# Patient Record
Sex: FEMALE | Race: ASIAN | NOT HISPANIC OR LATINO | Employment: FULL TIME | URBAN - METROPOLITAN AREA
[De-identification: names, ages, dates, MRNs, and addresses within clinical notes are randomized per-mention and may not be internally consistent; named-entity substitution may affect disease eponyms.]

---

## 2017-02-22 ENCOUNTER — MYC MEDICAL ADVICE (OUTPATIENT)
Dept: FAMILY MEDICINE | Facility: CLINIC | Age: 37
End: 2017-02-22

## 2017-02-22 DIAGNOSIS — H10.45 CHRONIC ALLERGIC CONJUNCTIVITIS: Primary | ICD-10-CM

## 2017-03-16 ENCOUNTER — ALLIED HEALTH/NURSE VISIT (OUTPATIENT)
Dept: FAMILY MEDICINE | Facility: CLINIC | Age: 37
End: 2017-03-16
Payer: COMMERCIAL

## 2017-03-16 VITALS — SYSTOLIC BLOOD PRESSURE: 118 MMHG | DIASTOLIC BLOOD PRESSURE: 82 MMHG

## 2017-03-16 DIAGNOSIS — I10 HYPERTENSION: Primary | ICD-10-CM

## 2017-03-16 PROCEDURE — 99207 ZZC NO CHARGE NURSE ONLY: CPT | Performed by: FAMILY MEDICINE

## 2017-03-16 NOTE — MR AVS SNAPSHOT
After Visit Summary   3/16/2017    See Roshan    MRN: [id number]           Patient Information     Date Of Birth          1980        Visit Information        Provider Department      3/16/2017 2:05 PM Marcia Clemens MD [facility]        Today's Diagnoses     Hypertension    -  1       Follow-ups after your visit        Who to contact     Normal or non-critical lab and imaging results will be communicated to you by MyChart, letter or phone within 4 business days after the clinic has received the results. If you do not hear from us within 7 days, please contact the clinic through MyChart or phone. If you have a critical or abnormal lab result, we will notify you by phone as soon as possible.  Submit refill requests through [employer] or call your pharmacy and they will forward the refill request to us. Please allow 3 business days for your refill to be completed.          If you need to speak with a  for additional information , please call: [phone]             Additional Information About Your Visit        [employer] Information     [employer] gives you secure access to your electronic health record. If you see a primary care provider, you can also send messages to your care team and make appointments. If you have questions, please call your primary care clinic.  If you do not have a primary care provider, please call [phone] and they will assist you.        Care EveryWhere ID     This is your Care EveryWhere ID. This could be used by other organizations to access your [facility] medical records  [id number]         Blood Pressure from Last 3 Encounters:   03/16/17 118/82   12/13/16 130/84   01/05/16 116/78    Weight from Last 3 Encounters:   12/13/16 195 lb (88.5 kg)   01/05/16 195 lb (88.5 kg)   07/16/15 190 lb (86.2 kg)              Today, you had the following     No orders found for display       Primary Care Provider Office Phone # Fax #    Marcia Clemens MD  [phone]       [facility] [address] [facility] [address]        Thank you!     Thank you for choosing [facility]  for your care. Our goal is always to provide you with excellent care. Hearing back from our patients is one way we can continue to improve our services. Please take a few minutes to complete the written survey that you may receive in the mail after your visit with us. Thank you!             Your Updated Medication List - Protect others around you: Learn how to safely use, store and throw away your medicines at www.disposemymeds.org.          This list is accurate as of: 3/16/17  2:07 PM.  Always use your most recent med list.                   Brand Name Dispense Instructions for use    * albuterol (2.5 MG/3ML) 0.083% neb solution     180 vial    Take 1 vial (2.5 mg) by nebulization every 6 hours as needed for shortness of breath / dyspnea or wheezing       * Albuterol Sulfate 108 (90 BASE) MCG/ACT Aepb     1 each    Inhale 2 puffs into the lungs every 4 hours       atenolol 25 MG tablet    TENORMIN    7 tablet    Take 1 tablet (25 mg) by mouth daily       cyclobenzaprine 10 MG tablet    FLEXERIL    30 tablet    Take 0.5-1 tablets (5-10 mg) by mouth 3 times daily as needed for muscle spasms       * FLUoxetine 10 MG capsule    PROzac    90 capsule    Take 1 capsule (10 mg) by mouth daily Patient is due for an office visit       * FLUoxetine 20 MG capsule    PROzac    90 capsule    Take 1 capsule (20 mg) by mouth daily       fluticasone 50 MCG/ACT spray    FLONASE    3 Package    Spray 1-2 sprays into both nostrils daily       guaiFENesin-codeine 100-10 MG/5ML Soln solution    ROBITUSSIN AC    240 mL    Take 10 mLs by mouth every 4 hours as needed       ketotifen 0.025 % Soln ophthalmic solution    ZADITOR/REFRESH ANTI-ITCH    1 Bottle    Place 1 drop into both eyes 2 times daily       levocetirizine 5 MG tablet    XYZAL    90 tablet    Take 1 tablet (5 mg) by  mouth every evening       losartan 50 MG tablet    COZAAR    90 tablet    Take 1 tablet (50 mg) by mouth daily       omeprazole 40 MG capsule    priLOSEC    90 capsule    Take 1 capsule (40 mg) by mouth daily Take 30-60 minutes before a meal.       ZYRTEC 10 MG tablet   Generic drug:  cetirizine     30 tablet    Take 1 tablet by mouth daily.       * Notice:  This list has 4 medication(s) that are the same as other medications prescribed for you. Read the directions carefully, and ask your doctor or other care provider to review them with you.

## 2017-03-16 NOTE — NURSING NOTE
See Roshan is enrolled/participating in the retail pharmacy Blood Pressure Goals Achievement Program (BPGAP).  See Roshan was evaluated at [facility] on March 16, 2017 at which time her blood pressure was:    BP Readings from Last 3 Encounters:   03/16/17 118/82   12/13/16 130/84   01/05/16 116/78     Reviewed lifestyle modifications for blood pressure control and reduction: including making healthy food choices, managing weight, getting regular exercise, smoking cessation, reducing alcohol consumption, monitoring blood pressure regularly.     See Roshan is not experiencing symptoms.    Follow-Up: BP is at goal of < 140/90mmHg (patient 18+ years of age with or without diabetes).  Recommended follow-up at pharmacy in 6 months.     Completed by: Daniela Mancini, PharmD  [facility]  [phone]

## 2017-05-04 ENCOUNTER — OFFICE VISIT (OUTPATIENT)
Dept: SLEEP MEDICINE | Facility: CLINIC | Age: 37
End: 2017-05-04
Attending: FAMILY MEDICINE
Payer: COMMERCIAL

## 2017-05-04 VITALS
SYSTOLIC BLOOD PRESSURE: 123 MMHG | DIASTOLIC BLOOD PRESSURE: 87 MMHG | BODY MASS INDEX: 38.91 KG/M2 | OXYGEN SATURATION: 96 % | WEIGHT: 193 LBS | HEART RATE: 79 BPM | HEIGHT: 59 IN

## 2017-05-04 DIAGNOSIS — G47.33 OSA (OBSTRUCTIVE SLEEP APNEA): ICD-10-CM

## 2017-05-04 PROCEDURE — 99205 OFFICE O/P NEW HI 60 MIN: CPT | Performed by: FAMILY MEDICINE

## 2017-05-04 NOTE — PATIENT INSTRUCTIONS

## 2017-05-04 NOTE — MR AVS SNAPSHOT
After Visit Summary   5/4/2017    See Roshan    MRN: [id number]           Patient Information     Date Of Birth          1980        Visit Information        Provider Department      5/4/2017 8:30 AM Anam Ewing MD [facility]        Today's Diagnoses     BENJAMIN (obstructive sleep apnea)          Care Instructions      Your BMI is Body mass index is 38.98 kg/(m^2).  Weight management is a personal decision.  If you are interested in exploring weight loss strategies, the following discussion covers the approaches that may be successful. Body mass index (BMI) is one way to tell whether you are at a healthy weight, overweight, or obese. It measures your weight in relation to your height.  A BMI of 18.5 to 24.9 is in the healthy range. A person with a BMI of 25 to 29.9 is considered overweight, and someone with a BMI of 30 or greater is considered obese. More than two-thirds of American adults are considered overweight or obese.  Being overweight or obese increases the risk for further weight gain. Excess weight may lead to heart disease and diabetes.  Creating and following plans for healthy eating and physical activity may help you improve your health.  Weight control is part of healthy lifestyle and includes exercise, emotional health, and healthy eating habits. Careful eating habits lifelong are the mainstay of weight control. Though there are significant health benefits from weight loss, long-term weight loss with diet alone may be very difficult to achieve- studies show long-term success with dietary management in less than 10% of people. Attaining a healthy weight may be especially difficult to achieve in those with severe obesity. In some cases, medications, devices and surgical management might be considered.  What can you do?  If you are overweight or obese and are interested in methods for weight loss, you should discuss this with your provider.     Consider  reducing daily calorie intake by 500 calories.     Keep a food journal.     Avoiding skipping meals, consider cutting portions instead.    Diet combined with exercise helps maintain muscle while optimizing fat loss. Strength training is particularly important for building and maintaining muscle mass. Exercise helps reduce stress, increase energy, and improves fitness. Increasing exercise without diet control, however, may not burn enough calories to loose weight.       Start walking three days a week 10-20 minutes at a time    Work towards walking thirty minutes five days a week     Eventually, increase the speed of your walking for 1-2 minutes at time    In addition, we recommend that you review healthy lifestyles and methods for weight loss available through the National Institutes of Health patient information sites:  http://win.niddk.nih.gov/publications/index.htm    And look into health and wellness programs that may be available through your health insurance provider, employer, local community center, or bev club.    Weight management plan: Patient was referred to their PCP to discuss a diet and exercise plan.          Follow-ups after your visit        Follow-up notes from your care team     Return in about 4 weeks (around 6/1/2017).      Who to contact     If you have questions or need follow up information about today's clinic visit or your schedule please contact [facility] directly at [phone].  Normal or non-critical lab and imaging results will be communicated to you by MyChart, letter or phone within 4 business days after the clinic has received the results. If you do not hear from us within 7 days, please contact the clinic through MyChart or phone. If you have a critical or abnormal lab result, we will notify you by phone as soon as possible.  Submit refill requests through [email or web address] or call your pharmacy and they will forward the refill request to us. Please allow 3 business  "days for your refill to be completed.          Additional Information About Your Visit        MyChart Information     [employer] gives you secure access to your electronic health record. If you see a primary care provider, you can also send messages to your care team and make appointments. If you have questions, please call your primary care clinic.  If you do not have a primary care provider, please call [phone] and they will assist you.        Care EveryWhere ID     This is your Care EveryWhere ID. This could be used by other organizations to access your [facility] medical records  [id number]        Your Vitals Were     Pulse Height Pulse Oximetry BMI (Body Mass Index)          79 1.499 m (4' 11\") 96% 38.98 kg/m2         Blood Pressure from Last 3 Encounters:   05/04/17 123/87   03/16/17 118/82   12/13/16 130/84    Weight from Last 3 Encounters:   05/04/17 87.5 kg (193 lb)   12/13/16 88.5 kg (195 lb)   01/05/16 88.5 kg (195 lb)              We Performed the Following     Comprehensive DME     SLEEP EVALUATION & MANAGEMENT REFERRAL - ADULT        Primary Care Provider Office Phone # Fax #    Marcia Clemens [phone]       [facility] [address] [facility] [address]        Thank you!     Thank you for choosing [facility]  for your care. Our goal is always to provide you with excellent care. Hearing back from our patients is one way we can continue to improve our services. Please take a few minutes to complete the written survey that you may receive in the mail after your visit with us. Thank you!             Your Updated Medication List - Protect others around you: Learn how to safely use, store and throw away your medicines at www.disposemymeds.org.          This list is accurate as of: 5/4/17 11:59 PM.  Always use your most recent med list.                   Brand Name Dispense Instructions for use    albuterol (2.5 MG/3ML) 0.083% neb solution     180 vial "    Take 1 vial (2.5 mg) by nebulization every 6 hours as needed for shortness of breath / dyspnea or wheezing       FLUoxetine 20 MG capsule    PROzac    90 capsule    Take 1 capsule (20 mg) by mouth daily       fluticasone 50 MCG/ACT spray    FLONASE    3 Package    Spray 1-2 sprays into both nostrils daily       levocetirizine 5 MG tablet    XYZAL    90 tablet    Take 1 tablet (5 mg) by mouth every evening       losartan 50 MG tablet    COZAAR    90 tablet    Take 1 tablet (50 mg) by mouth daily       omeprazole 40 MG capsule    priLOSEC    90 capsule    Take 1 capsule (40 mg) by mouth daily Take 30-60 minutes before a meal.

## 2017-05-04 NOTE — PROGRESS NOTES
Sleep Consultation:    Date on this visit: 5/4/2017    Arturo Islas is sent by Marcia Clemens for a sleep consultation regarding previous diagnosis of BENJAMIN.    Primary Physician: Marcia Clemens     Arturo Islas is a 36 year old female with a significant past medical history of untreated obstructive sleep apnea (PSG performed 11/4/2015 weight 197 lbs, AHI 21, RDI 41, eduardo SpO2 80%), HTN and obesity who comes in today seeking treatment for daytime fatigue. She reports that she never returned to see the results of her testing in 2015 because she felt like she slept so poorly that the results would not be relevant. She comes in today concerned about ongoing daytime fatigue. She reports going to bed between 11-12pm every night and waking around 7-8am. When she awakes she still feels tired, leading her to question the quality of her sleep. Throughout the day the tiredness seems to linger. She does not consume caffeine on a routine basis. On weekends she routinely takes naps which last anywhere from 30min to several hours. Arturo reports that she does not recall a lot of night time awakenings but she does know that she snores when she sleeps. She is interested in pursuing a treatment option that will increase her energy throughout the day.     Allergies:    Allergies   Allergen Reactions     Amoxicillin Hives       Medications:    Current Outpatient Prescriptions   Medication Sig Dispense Refill     losartan (COZAAR) 50 MG tablet Take 1 tablet (50 mg) by mouth daily 90 tablet 3     FLUoxetine (PROZAC) 20 MG capsule Take 1 capsule (20 mg) by mouth daily 90 capsule 3     levocetirizine (XYZAL) 5 MG tablet Take 1 tablet (5 mg) by mouth every evening 90 tablet 3     omeprazole (PRILOSEC) 40 MG capsule Take 1 capsule (40 mg) by mouth daily Take 30-60 minutes before a meal. 90 capsule 3     albuterol (2.5 MG/3ML) 0.083% nebulizer solution Take 1 vial (2.5 mg) by nebulization every 6 hours as needed for shortness of breath / dyspnea or  wheezing 180 vial 1     fluticasone (FLONASE) 50 MCG/ACT nasal spray Spray 1-2 sprays into both nostrils daily 3 Package 2       Problem List:  Patient Active Problem List    Diagnosis Date Noted     Health Care Home 01/21/2014     Priority: Medium     EMERGENCY CARE PLAN  January 21, 2014: No current Care Coordination follow up planned. Please refer if Care Coordination services are needed.    Presenting Problem Signs and Symptoms Treatment Plan   Questions or concerns   during clinic hours   I will call my clinic directly:  [address] [address]  [phone].    Questions or concerns outside clinic hours   I will call the 24 hour nurse line at   [phone] or [facility].   Need to schedule an appointment   I will call the 24 hour scheduling team at [phone] or my clinic directly at [phone].    Same day treatment     I will call my clinic first, nurse line if after hours, urgent care and express care if needed.   Clinic care coordination services (regular clinic hours)     I will call a clinic care coordinator directly:     Henry Sanchez RN  Mon, Tues, Fri - [phone]  Wed, Thurs - [phone]    Carole Doyle :    [phone]    Or call my clinic at [phone] and ask to speak with care coordination.   Crisis Services: Behavioral or Mental Health  [facility] 24 Hour Phone Line  [phone]    [facility] 24 Hour Crisis Services  [phone]    [facility] Prov[facility] [phone]    [facility]   [phone]       Emergency treatment -- Immediately    CAll 911          Menorrhagia 11/13/2012     Priority: Medium     Obesity (BMI 30-39.9) 04/04/2012     Priority: Medium     Infertility associated with anovulation 12/19/2011     Priority: Medium     CARDIOVASCULAR SCREENING; LDL GOAL LESS THAN 130 08/26/2011     Priority: Medium     Hypertension 08/26/2011     Priority: Medium        Past Medical/Surgical  History:  Past Medical History:   Diagnosis Date     Hypertension      Past Surgical History:   Procedure Laterality Date     ESOPHAGOSCOPY, GASTROSCOPY, DUODENOSCOPY (EGD), COMBINED N/A 7/16/2015    Procedure: COMBINED ESOPHAGOSCOPY, GASTROSCOPY, DUODENOSCOPY (EGD), BIOPSY SINGLE OR MULTIPLE;  Surgeon: Keagan Lozano MD;  Location: [facility]     INJECT EPIDURAL CAUDAL  6/25/2012    Procedure: INJECT EPIDURAL CAUDAL;  ANUEL Caudal--;  Surgeon: Provider, Generic Anesthesia;  Location: [facility]     [place] teeth pulled one  2008       Social History:  Social History     Social History     Marital status:      Spouse name: Candy Islas     Number of children: 0     Years of education: 12+     Occupational History      [employer]     Social History Main Topics     Smoking status: Former Smoker     Packs/day: 0.50     Years: 10.00     Types: Cigarettes     Quit date: 1/1/2005     Smokeless tobacco: Never Used     Alcohol use No     Drug use: No     Sexual activity: No     Other Topics Concern     Parent/Sibling W/ Cabg, Mi Or Angioplasty Before 65f 55m? No      Service No     Blood Transfusions No     Caffeine Concern Yes     mt dew 1 a week     Occupational Exposure No     Hobby Hazards No     Sleep Concern No     Stress Concern No     Weight Concern Yes     Special Diet Yes     one a day and probiotic     Back Care No     Exercise No     Bike Helmet No     Seat Belt Yes     Social History Narrative       Family History:  Family History   Problem Relation Age of Onset     DIABETES Mother      Hypertension Mother      Arthritis Mother      Cardiovascular Father      Hypertension Father      Hypertension Brother      Hypertension Brother      Hypertension Brother      Hypertension Brother      Hypertension Sister      Hypertension Sister      Hypertension Brother        Review of Systems:  A complete review of systems reviewed by me is negative with the exeption of what has been mentioned  "in the history of present illness.    Physical Examination:  Vitals: /87  Pulse 79  Ht 1.499 m (4' 11\")  Wt 87.5 kg (193 lb)  SpO2 96%  BMI 38.98 kg/m2  BMI= Body mass index is 38.98 kg/(m^2).     General: comes to clinic alone, positive affect  EENT: teeth showing no appreciable wear from grinding     Neck Cir (cm): 40 cm    [place] Total Score 5/4/2017   Total score - [place] 6       GENERAL APPEARANCE: healthy, alert, no distress and over weight  RESP: lungs clear to auscultation - no rales, rhonchi or wheezes  CV: regular rates and rhythm, normal S1 S2, no S3 or S4 and no murmur, click or rub  PSYCH: mentation appears normal and affect normal/bright    Impression/Plan:    37 yo F with pmhx of untreated BENJAMIN (study completed 11/4/2015, AHI 21, RDI 41), HTN and obesity seeking treatment for daytime fatigue. Untreated BENJAMIN likely contributing to tiredness.   1. Begin treatment with auto-titrate CPAP 5-15 cm H2O   2. Over the counter mouth guard for pt's concern about grinding teeth     Literature provided regarding sleep apnea.      Polysomnography reviewed.  Obstructive sleep apnea reviewed.  Complications of untreated sleep apnea were reviewed.    Scribe Disclosure:   SALAS ADAIR, MS4, am serving as a scribe; to document services personally performed by Dr. Anam Ewing, based on data collection and the provider's statements to me.     Provider Disclosure:  Anam ADAIR, agree with above History, Review of Systems, Physical exam and Plan.  I have reviewed the content of the documentation and have edited it as needed. I have personally performed the services documented here and the documentation accurately represents those services and the decisions I have made.      I have spent 60 minutes with this patient today in which greater than 50% of this time was spent in the counseling / coordination of care.      Anam Ewing MD        CC: Marcia Clemens        "

## 2017-06-30 ENCOUNTER — DOCUMENTATION ONLY (OUTPATIENT)
Dept: SLEEP MEDICINE | Facility: CLINIC | Age: 37
End: 2017-06-30

## 2017-06-30 NOTE — PROGRESS NOTES
Patient was offered choice of vendor and chose [facility] include 234 beds at the [facility].  Patient Arturo Islas was set up at [facility]  on June 30, 2017. Patient received a [employer] AirSense 10 Auto. Pressures were set at 5-15 cm H2O.   Patient s ramp is 5 cm H2O for Auto and FLEX/EPR is EPR, 2.  Patient received a Resmed Mask name: AIRFIT N20  Nasal mask Size Medium, heated tubing and heated humidifier.  Patient is enrolled in the STM Program and does not need to meet compliance. Patient has a follow up on TBD with Dr. Ewing.    Marisable Ramirez

## 2017-07-01 ENCOUNTER — APPOINTMENT (OUTPATIENT)
Dept: GENERAL RADIOLOGY | Facility: CLINIC | Age: 37
End: 2017-07-01
Attending: NURSE PRACTITIONER
Payer: COMMERCIAL

## 2017-07-01 ENCOUNTER — HOSPITAL ENCOUNTER (EMERGENCY)
Facility: CLINIC | Age: 37
Discharge: HOME | End: 2017-07-01
Attending: NURSE PRACTITIONER | Admitting: NURSE PRACTITIONER
Payer: COMMERCIAL

## 2017-07-01 VITALS
WEIGHT: 190 LBS | SYSTOLIC BLOOD PRESSURE: 136 MMHG | BODY MASS INDEX: 38.3 KG/M2 | TEMPERATURE: 98.1 F | DIASTOLIC BLOOD PRESSURE: 87 MMHG | HEIGHT: 59 IN | HEART RATE: 110 BPM | OXYGEN SATURATION: 95 % | RESPIRATION RATE: 18 BRPM

## 2017-07-01 DIAGNOSIS — S52.124A CLOSED NONDISPLACED FRACTURE OF HEAD OF RIGHT RADIUS, INITIAL ENCOUNTER: Primary | ICD-10-CM

## 2017-07-01 PROCEDURE — 73080 X-RAY EXAM OF ELBOW: CPT | Mod: RT

## 2017-07-01 PROCEDURE — 29105 APPLICATION LONG ARM SPLINT: CPT | Performed by: NURSE PRACTITIONER

## 2017-07-01 PROCEDURE — 99213 OFFICE O/P EST LOW 20 MIN: CPT | Mod: 25 | Performed by: NURSE PRACTITIONER

## 2017-07-01 PROCEDURE — 29105 APPLICATION LONG ARM SPLINT: CPT

## 2017-07-01 PROCEDURE — 99213 OFFICE O/P EST LOW 20 MIN: CPT | Mod: 25

## 2017-07-01 NOTE — DISCHARGE INSTRUCTIONS
Elbow Fracture    You have a break (fracture) of one or more bones of your elbow joint. This may be a small crack in the bone. Or it may be a major break, with the broken parts pushed out of position.  This fracture usually takes 4 to 12 weeks to heal, depending on the type. The first step in treatment is with a splint or cast. Severe fractures may need surgery to put the bone fragments back into place.  Home care  Follow these guidelines when caring for yourself at home:    Keep your arm elevated to reduce pain and swelling. When sitting or lying down keep your arm above the level of your heart. You can do this by placing your arm on a pillow that rests on your chest or on a pillow at your side. This is most important during the first 2 days (48 hours) after the injury.    Put an ice pack on the injured area. Do this for 20 minutes every 1 to 2 hours the first day. You can make an ice pack by wrapping a plastic bag of ice cubes in a thin towel. As the ice melts, be careful that the cast or splint doesn t get wet. You can place the ice pack inside the sling and directly over the splint or cast. Continue to use the ice pack 3 to 4 times a day for the next 2 days. Then use the ice pack as needed to ease pain and swelling.    Keep the splint or cast completely dry at all times. Bathe with your splint or cast out of the water. Protect it with a large plastic bag, rubber-banded at the top end. If a fiberglass splint or cast gets wet, you can dry it with a hair dryer.    You may use acetaminophen or ibuprofen to control pain, unless another pain medicine was prescribed. If you have chronic liver or kidney disease, talk with your healthcare provider before using these medicines. Also talk with your provider if you ve had a stomach ulcer or gastrointestinal bleeding.    Don t put creams or objects under the cast if you have itching.  Follow-up care  Follow up with your healthcare provider in 1 week, or as advised. This is  to make sure the bone is healing the way it should. If a splint was put on, it may be changed to a cast during your follow-up visit.  X-rays may be taken. You will be told of any new findings that may affect your care.  When to seek medical advice  Call your healthcare provider right away if any of these occur:    The cast or splint cracks    The plaster cast or splint becomes wet or soft    The fiberglass cast or splint stays wet for more than 24 hours    Tightness or pain under the cast or splint gets worse    Bad odor from the cast or wound fluid stains the cast    Fingers become swollen, cold, blue, numb, or tingly    You can t move your fingers    Skin around cast becomes red    Fever of 100.4 F (38 C) or higher, or as directed by your healthcare provider   Date Last Reviewed: 2/6/2017 2000-2017 [employer]. [address] [place] [id number]. All rights reserved. This information is not intended as a substitute for professional medical care. Always follow your healthcare professional's instructions.

## 2017-07-01 NOTE — ED AVS SNAPSHOT
[facility] Emergency Department    [address] [facility] [address]    Phone:  [phone]    Fax:  [phone]                                       Arturo Islas   MRN: [id number]    Department:  [facility] Emergency Department   Date of Visit:  7/1/2017           Patient Information     Date Of Birth          1980        Your diagnoses for this visit were:     Closed nondisplaced fracture of head of right radius, initial encounter        You were seen by Louise Velázquez APRN CNP.      Follow-up Information     Follow up with ortho  In 3 days.    Why:  For wound re-check        Follow up In 3 days.    Why:  For wound re-check        Discharge Instructions         Elbow Fracture    You have a break (fracture) of one or more bones of your elbow joint. This may be a small crack in the bone. Or it may be a major break, with the broken parts pushed out of position.  This fracture usually takes 4 to 12 weeks to heal, depending on the type. The first step in treatment is with a splint or cast. Severe fractures may need surgery to put the bone fragments back into place.  Home care  Follow these guidelines when caring for yourself at home:    Keep your arm elevated to reduce pain and swelling. When sitting or lying down keep your arm above the level of your heart. You can do this by placing your arm on a pillow that rests on your chest or on a pillow at your side. This is most important during the first 2 days (48 hours) after the injury.    Put an ice pack on the injured area. Do this for 20 minutes every 1 to 2 hours the first day. You can make an ice pack by wrapping a plastic bag of ice cubes in a thin towel. As the ice melts, be careful that the cast or splint doesn t get wet. You can place the ice pack inside the sling and directly over the splint or cast. Continue to use the ice pack 3 to 4 times a day for the next 2 days. Then use the ice pack as needed to ease pain and swelling.    Keep  the splint or cast completely dry at all times. Bathe with your splint or cast out of the water. Protect it with a large plastic bag, rubber-banded at the top end. If a fiberglass splint or cast gets wet, you can dry it with a hair dryer.    You may use acetaminophen or ibuprofen to control pain, unless another pain medicine was prescribed. If you have chronic liver or kidney disease, talk with your healthcare provider before using these medicines. Also talk with your provider if you ve had a stomach ulcer or gastrointestinal bleeding.    Don t put creams or objects under the cast if you have itching.  Follow-up care  Follow up with your healthcare provider in 1 week, or as advised. This is to make sure the bone is healing the way it should. If a splint was put on, it may be changed to a cast during your follow-up visit.  X-rays may be taken. You will be told of any new findings that may affect your care.  When to seek medical advice  Call your healthcare provider right away if any of these occur:    The cast or splint cracks    The plaster cast or splint becomes wet or soft    The fiberglass cast or splint stays wet for more than 24 hours    Tightness or pain under the cast or splint gets worse    Bad odor from the cast or wound fluid stains the cast    Fingers become swollen, cold, blue, numb, or tingly    You can t move your fingers    Skin around cast becomes red    Fever of 100.4 F (38 C) or higher, or as directed by your healthcare provider   Date Last Reviewed: 2/6/2017 2000-2017 [employer]. [address] [place] [address], [place] [address]. All rights reserved. This information is not intended as a substitute for professional medical care. Always follow your healthcare professional's instructions.          24 Hour Appointment Hotline       To make an appointment at any [facility] clinic, call [phone] ([phone]). If you don't have a family doctor or clinic, we will help you find one.  [facility] [place] are conveniently located to serve the needs of you and your family.             Review of your medicines      Our records show that you are taking the medicines listed below. If these are incorrect, please call your family doctor or clinic.        Dose / Directions Last dose taken    albuterol (2.5 MG/3ML) 0.083% neb solution   Dose:  1 vial   Quantity:  180 vial        Take 1 vial (2.5 mg) by nebulization every 6 hours as needed for shortness of breath / dyspnea or wheezing   Refills:  1        FLUoxetine 20 MG capsule   Commonly known as:  PROzac   Dose:  20 mg   Quantity:  90 capsule        Take 1 capsule (20 mg) by mouth daily   Refills:  3        fluticasone 50 MCG/ACT spray   Commonly known as:  FLONASE   Dose:  1-2 spray   Quantity:  3 Package        Spray 1-2 sprays into both nostrils daily   Refills:  2        levocetirizine 5 MG tablet   Commonly known as:  XYZAL   Dose:  5 mg   Quantity:  90 tablet        Take 1 tablet (5 mg) by mouth every evening   Refills:  3        losartan 50 MG tablet   Commonly known as:  COZAAR   Dose:  50 mg   Quantity:  90 tablet        Take 1 tablet (50 mg) by mouth daily   Refills:  3        omeprazole 40 MG capsule   Commonly known as:  priLOSEC   Dose:  40 mg   Quantity:  90 capsule        Take 1 capsule (40 mg) by mouth daily Take 30-60 minutes before a meal.   Refills:  3        order for DME        Equipment ordered: RESMED Auto PAP Mask type: Nasal Settings: 5-15 CM H2O   Refills:  0                Procedures and tests performed during your visit     XR Elbow Right G/E 3 Views      Orders Needing Specimen Collection     None      Pending Results     Date and Time Order Name Status Description    7/1/2017 1607 XR Elbow Right G/E 3 Views Preliminary             Pending Culture Results     No orders found from 6/29/2017 to 7/2/2017.            Pending Results Instructions     If you had any lab results that were not finalized at the time of your Discharge,  you can call the ED Lab Result RN at [phone]. You will be contacted by this team for any positive Lab results or changes in treatment. The nurses are available 7 days a week from 10A to 6:30P.  You can leave a message 24 hours per day and they will return your call.        Test Results From Your Hospital Stay              7/1/2017  4:26 PM      Narrative     ELBOW RIGHT THREE OR MORE VIEWS   7/1/2017 4:19 PM     HISTORY: Fell backwards on outstretched hand.    COMPARISON: None.        Impression     IMPRESSION: There is a radial head fracture with a secondary  hemarthrosis. The fracture is relatively nondisplaced.                Thank you for choosing [facility]       Thank you for choosing [facility] for your care. Our goal is always to provide you with excellent care. Hearing back from our patients is one way we can continue to improve our services. Please take a few minutes to complete the written survey that you may receive in the mail after you visit with us. Thank you!        [employer] Information     [employer] gives you secure access to your electronic health record. If you see a primary care provider, you can also send messages to your care team and make appointments. If you have questions, please call your primary care clinic.  If you do not have a primary care provider, please call [phone] and they will assist you.        Care EveryWhere ID     This is your Care EveryWhere ID. This could be used by other organizations to access your [facility] medical records  [id number]        Equal Access to Services     ANTONIO BECERRA : Vanessa Romero, toan amezquita, perla romero. So [facility] [phone].    ATENCIÓN: Si habla español, tiene a woods disposición servicios gratuitos de asistencia lingüística. Llame al [phone].    We comply with applicable federal civil rights laws and [place] laws. We do not discriminate on the basis of race, color,  national origin, age, disability sex, sexual orientation or gender identity.            After Visit Summary       This is your record. Keep this with you and show to your community pharmacist(s) and doctor(s) at your next visit.

## 2017-07-01 NOTE — ED AVS SNAPSHOT
[facility] Emergency Department    [address] [facility] [address]    Phone:  [phone]    Fax:  [phone]                                       Arturo Islas   MRN: [id number]    Department:  [facility] Emergency Department   Date of Visit:  7/1/2017           After Visit Summary Signature Page     I have received my discharge instructions, and my questions have been answered. I have discussed any challenges I see with this plan with the nurse or doctor.    ..........................................................................................................................................  Patient/Patient Representative Signature      ..........................................................................................................................................  Patient Representative Print Name and Relationship to Patient    ..................................................               ................................................  Date                                            Time    ..........................................................................................................................................  Reviewed by Signature/Title    ...................................................              ..............................................  Date                                                            Time

## 2017-07-01 NOTE — ED PROVIDER NOTES
History     Chief Complaint   Patient presents with     Elbow Pain     HPI  See Roshan is a 36 year old female who presents with right arm injury.  Pt states she was skateboarding and fell backwards on her outstretched arm.  She reports inability to fully straighten the arm but reports normal sensation of fingers and normal movement of right hand.  She reports pain at the elbow and mostly the inner elbow.  She reports that if she does not move the are there is no pain.    I have reviewed the Medications, Allergies, Past Medical and Surgical History, and Social History in the Epic system.    Allergies:   Allergies   Allergen Reactions     Amoxicillin Hives       No current facility-administered medications on file prior to encounter.   Current Outpatient Prescriptions on File Prior to Encounter:  order for DME Equipment ordered: RESMED Auto PAP Mask type: Nasal Settings: 5-15 CM H2O   losartan (COZAAR) 50 MG tablet Take 1 tablet (50 mg) by mouth daily   FLUoxetine (PROZAC) 20 MG capsule Take 1 capsule (20 mg) by mouth daily   levocetirizine (XYZAL) 5 MG tablet Take 1 tablet (5 mg) by mouth every evening   omeprazole (PRILOSEC) 40 MG capsule Take 1 capsule (40 mg) by mouth daily Take 30-60 minutes before a meal.   albuterol (2.5 MG/3ML) 0.083% nebulizer solution Take 1 vial (2.5 mg) by nebulization every 6 hours as needed for shortness of breath / dyspnea or wheezing   fluticasone (FLONASE) 50 MCG/ACT nasal spray Spray 1-2 sprays into both nostrils daily     Patient Active Problem List   Diagnosis     CARDIOVASCULAR SCREENING; LDL GOAL LESS THAN 130     Hypertension     Infertility associated with anovulation     Obesity (BMI 30-39.9)     Menorrhagia     Health Care Home     Past Surgical History:   Procedure Laterality Date     ESOPHAGOSCOPY, GASTROSCOPY, DUODENOSCOPY (EGD), COMBINED N/A 7/16/2015    Procedure: COMBINED ESOPHAGOSCOPY, GASTROSCOPY, DUODENOSCOPY (EGD), BIOPSY SINGLE OR MULTIPLE;  Surgeon: Keagan Lozano,  "MD;  Location: [facility]     INJECT EPIDURAL CAUDAL  6/25/2012    Procedure: INJECT EPIDURAL CAUDAL;  ANUEL Caudal--;  Surgeon: Provider, Generic Anesthesia;  Location: [facility]     [place] teeth pulled one  2008     Most Recent Immunizations   Administered Date(s) Administered     Influenza (IIV3) 11/01/2014     Influenza vaccine ages 6-35 months 10/25/2016     Tdap (Adacel,Boostrix) 12/10/2010       Review of Systems  10 point ROS of systems including Constitutional, Eyes, Respiratory, Cardiovascular, Gastroenterology, Genitourinary, Integumentary, Muscularskeletal, Psychiatric were all negative except for pertinent positives noted in my HPI.    Physical Exam   BP: 136/87  Pulse: 110  Temp: 98.1  F (36.7  C)  Resp: 18  Height: 149.9 cm (4' 11\")  Weight: 86.2 kg (190 lb)  SpO2: 95 %  Physical Exam   Constitutional: She is oriented to person, place, and time. She appears well-developed and well-nourished. She appears distressed (mild distress with pain).   HENT:   Head: Normocephalic and atraumatic.   Cardiovascular: Normal rate, regular rhythm and normal heart sounds.  Exam reveals no gallop and no friction rub.    No murmur heard.  Pulmonary/Chest: Effort normal and breath sounds normal. No respiratory distress. She has no wheezes. She has no rales. She exhibits no tenderness.   Musculoskeletal:        Right elbow: She exhibits decreased range of motion (can extend to approximately 120 degrees and can flex to 90 degrees with discomfort) and swelling (mild swelling). She exhibits no effusion and no laceration. Tenderness found. Radial head tenderness noted.   Neurological: She is alert and oriented to person, place, and time.   Skin: Skin is warm, dry and intact. No rash noted. She is not diaphoretic. No erythema. No pallor.   Nursing note and vitals reviewed.      ED Course     ED Course     Splint application  Date/Time: 7/1/2017 4:50 PM  Performed by: RAMIREZ CARDOZO  Authorized by: RAMIREZ CARDOZO "   Consent: Verbal consent obtained. Written consent not obtained.  Consent given by: patient  Patient understanding: patient states understanding of the procedure being performed  Relevant documents: relevant documents present and verified  Imaging studies: imaging studies available  Patient identity confirmed: verbally with patient  Location details: right elbow  Splint type: long arm  Supplies used: cotton padding,  elastic bandage and Ortho-Glass  Post-procedure: The splinted body part was neurovascularly unchanged following the procedure.  Patient tolerance: Patient tolerated the procedure well with no immediate complications          Labs Ordered and Resulted from Time of ED Arrival Up to the Time of Departure from the ED - No data to display  Results for orders placed or performed during the hospital encounter of 07/01/17   XR Elbow Right G/E 3 Views    Narrative    ELBOW RIGHT THREE OR MORE VIEWS   7/1/2017 4:19 PM     HISTORY: Fell backwards on outstretched hand.    COMPARISON: None.      Impression    IMPRESSION: There is a radial head fracture with a secondary  hemarthrosis. The fracture is relatively nondisplaced.       Assessments & Plan (with Medical Decision Making)     I have reviewed the nursing notes.    I have reviewed the findings, diagnosis, plan and need for follow up with the patient.  Arturo Islas is a 36 year old female who presents with right arm injury.  Pt states she was skateboarding and fell backwards on her outstretched arm.  She reports inability to fully straighten the arm but reports normal sensation of fingers and normal movement of right hand.  She reports pain at the elbow and mostly the inner elbow.  She reports that if she does not move the are there is no pain. Exam reveals tenderness at the radial head and inability to straighten fully.  Xray reveals non displaced radial head fracture that was splinted with long arm splint without complications or neurovascular compromise.   Discussed ortho referral for ongoing care.    Current Discharge Medication List          Final diagnoses:   Closed nondisplaced fracture of head of right radius, initial encounter       7/1/2017   [facility] EMERGENCY DEPARTMENT     Louise Velázquez, ALETHA CNP  07/01/17 2161

## 2017-07-05 ENCOUNTER — DOCUMENTATION ONLY (OUTPATIENT)
Dept: SLEEP MEDICINE | Facility: CLINIC | Age: 37
End: 2017-07-05

## 2017-07-05 ENCOUNTER — OFFICE VISIT (OUTPATIENT)
Dept: ORTHOPEDICS | Facility: CLINIC | Age: 37
End: 2017-07-05
Payer: COMMERCIAL

## 2017-07-05 VITALS
SYSTOLIC BLOOD PRESSURE: 136 MMHG | WEIGHT: 193 LBS | HEIGHT: 59 IN | BODY MASS INDEX: 38.91 KG/M2 | DIASTOLIC BLOOD PRESSURE: 89 MMHG | HEART RATE: 73 BPM

## 2017-07-05 DIAGNOSIS — S52.121A CLOSED DISPLACED FRACTURE OF HEAD OF RIGHT RADIUS, INITIAL ENCOUNTER: Primary | ICD-10-CM

## 2017-07-05 PROCEDURE — 99213 OFFICE O/P EST LOW 20 MIN: CPT | Performed by: FAMILY MEDICINE

## 2017-07-05 ASSESSMENT — PAIN SCALES - GENERAL: PAINLEVEL: SEVERE PAIN (7)

## 2017-07-05 NOTE — PROGRESS NOTES
HISTORY OF PRESENT ILLNESS  Ms. Islas is a pleasant 36 year old year old female who presents to clinic today with an elbow fracture.  She's seen at the request of the [facility].  See explains that on July 1 she was skateboarding and fell backwards on to an outstretched arm.  Immediate pain, was unable to fully extend her arm.  She was seen at the emergency room and was given a sling with a posterior splint for comfort.  She still has pain, although slightly improved. She's been spending all of her time in the splint.  Quality:  achy pain, sharp at times    Severity: moderate to severe  Timing: occurs intermittently  Associated signs & symptoms: none  Previous similar injury: no  Additional history: as documented    MEDICAL HISTORY  Patient Active Problem List   Diagnosis     CARDIOVASCULAR SCREENING; LDL GOAL LESS THAN 130     Hypertension     Infertility associated with anovulation     Obesity (BMI 30-39.9)     Menorrhagia     Health Care Home       Current Outpatient Prescriptions   Medication Sig Dispense Refill     order for DME Equipment ordered: RESMED Auto PAP Mask type: Nasal Settings: 5-15 CM H2O       losartan (COZAAR) 50 MG tablet Take 1 tablet (50 mg) by mouth daily 90 tablet 3     FLUoxetine (PROZAC) 20 MG capsule Take 1 capsule (20 mg) by mouth daily 90 capsule 3     levocetirizine (XYZAL) 5 MG tablet Take 1 tablet (5 mg) by mouth every evening 90 tablet 3     omeprazole (PRILOSEC) 40 MG capsule Take 1 capsule (40 mg) by mouth daily Take 30-60 minutes before a meal. 90 capsule 3     albuterol (2.5 MG/3ML) 0.083% nebulizer solution Take 1 vial (2.5 mg) by nebulization every 6 hours as needed for shortness of breath / dyspnea or wheezing 180 vial 1     fluticasone (FLONASE) 50 MCG/ACT nasal spray Spray 1-2 sprays into both nostrils daily 3 Package 2       Allergies   Allergen Reactions     Amoxicillin Hives       Family History   Problem Relation Age of Onset     DIABETES Mother   "    Hypertension Mother      Arthritis Mother      Cardiovascular Father      Hypertension Father      Hypertension Brother      Hypertension Brother      Hypertension Brother      Hypertension Brother      Hypertension Sister      Hypertension Sister      Hypertension Brother        Additional medical/Social/Surgical histories reviewed in [facility] and updated as appropriate.     REVIEW OF SYSTEMS (7/5/2017)  10 point ROS of systems including Constitutional, Eyes, Respiratory, Cardiovascular, Gastroenterology, Genitourinary, Integumentary, Musculoskeletal, Psychiatric were all negative except for pertinent positives noted in my HPI.     PHYSICAL EXAM  Vitals:    07/05/17 1058   BP: 136/89   Pulse: 73   Weight: 87.5 kg (193 lb)   Height: 1.499 m (4' 11\")     General  - normal appearance, in no obvious distress  CV  - normal radial pulse  Pulm  - normal respiratory pattern, non-labored  Musculoskeletal - right elbow  - inspection: mild elbow swelling  - palpation: no bony or soft tissue tenderness  - ROM: extension and supination limited, painful, no clicks or mechanical block  - strength: 5/5  strength, 5/5 flexion, extension, pronation, supination  Neuro  - no sensory or motor deficit, grossly normal coordination, normal muscle tone  Skin  - no ecchymosis, erythema, warmth, or induration, no obvious rash  Psych  - interactive, appropriate, normal mood and affect          ASSESSMENT & PLAN  Ms. Islas is a 36 year old year old female who is in the office today with a right radial head fracture.    I reviewed her x-ray in the room with her which shows a intra-articular radial head fracture with about 1 mm of displacement.    See's radial head fracture is a marty classification 1.  She'll likely do very well with nonoperative treatment.  I do think she can stay in her splint and sling for comfort, although I do recommend early mobilization to prevent loss of range of motion of the elbow.  It would be ideal if See could " use her sling only for comfort, without the splint, and come out of the sling a few times a day for range of motion exercises.    She will follow up in 5-7 days, at that visit we'll do a repeat radiograph.    It was a pleasure taking care of See.        Adam Garsia DO, CAS[facility]

## 2017-07-05 NOTE — PATIENT INSTRUCTIONS
Thanks for coming today.  Ortho/Sports Medicine Clinic  [address] [place] [address]    To schedule future appointments in Ortho Clinic, you may call [phone].    To schedule ordered imaging by your provider:   Call [facility] [phone]    To schedule an injection ordered by your provider:  Call [facility] Injection scheduling line: [phone]  [facility] available online at:  [email or web address]    Please call if any further questions or concerns ([phone]).  Clinic hours 8 am to 5 pm.    Return to clinic (call) if symptoms worsen or fail to improve.

## 2017-07-05 NOTE — MR AVS SNAPSHOT
After Visit Summary   2017    See Roshan    MRN: [id number]           Patient Information     Date Of Birth          1980        Visit Information        Provider Department      2017 11:00 AM Adam Garsia DO [facility]        Care Instructions    Thanks for coming today.  Ortho/Sports Medicine Clinic  [address] [place] [address]    To schedule future appointments in Ortho Clinic, you may call [phone].    To schedule ordered imaging by your provider:   Call Central Imaging [phone]    To schedule an injection ordered by your provider:  Call Central Imaging Injection scheduling line: [phone]  [employer] available online at:  [email or web address]    Please call if any further questions or concerns ([phone]).  Clinic hours 8 am to 5 pm.    Return to clinic (call) if symptoms worsen or fail to improve.          Follow-ups after your visit        Your next 10 appointments already scheduled     2017  1:20 PM CDT   Return Visit with Adam Garsia DO   [facility] ([facility])    [address] [place] [address] [phone]              Who to contact     If you have questions or need follow up information about today's clinic visit or your schedule please contact [facility] directly at [phone].  Normal or non-critical lab and imaging results will be communicated to you by MyChart, letter or phone within 4 business days after the clinic has received the results. If you do not hear from us within 7 days, please contact the clinic through MyChart or phone. If you have a critical or abnormal lab result, we will notify you by phone as soon as possible.  Submit refill requests through [employer] or call your pharmacy and they will forward the refill request to us. Please allow 3 business days for your refill to be completed.          Additional  "Information About Your Visit        MyChart Information     [employer] gives you secure access to your electronic health record. If you see a primary care provider, you can also send messages to your care team and make appointments. If you have questions, please call your primary care clinic.  If you do not have a primary care provider, please call [phone] and they will assist you.      [employer] is an electronic gateway that provides easy, online access to your medical records. With [employer], you can request a clinic appointment, read your test results, renew a prescription or communicate with your care team.     To access your existing account, please contact your [facility] or call [phone] for assistance.        Care EveryWhere ID     This is your Care EveryWhere ID. This could be used by other organizations to access your [place] medical records  [id number]        Your Vitals Were     Pulse Height BMI (Body Mass Index)             73 1.499 m (4' 11\") 38.98 kg/m2          Blood Pressure from Last 3 Encounters:   07/05/17 136/89   07/01/17 136/87   05/04/17 123/87    Weight from Last 3 Encounters:   07/05/17 87.5 kg (193 lb)   07/01/17 86.2 kg (190 lb)   05/04/17 87.5 kg (193 lb)              Today, you had the following     No orders found for display       Primary Care Provider Office Phone # Fax #    Marcia Clemens [phone]       [facility] [address] [facility] [address]        Equal Access to Services     ANTONIO BECERRA AH: Hadii aad ku hadasho Soomaali, waaxda luqadaha, qaybta kaalmada adeegyada, waxbarby idiin haypaxtonn priscilla chapaarakhushboo la'samuel . So [facility] [phone].    ATENCIÓN: Si habla español, tiene a woods disposición servicios gratuitos de asistencia lingüística. Llame al [phone].    We comply with applicable federal civil rights laws and [place] laws. We do not discriminate on the basis of race, color, national origin, age, " disability sex, sexual orientation or gender identity.            Thank you!     Thank you for choosing [facility]  for your care. Our goal is always to provide you with excellent care. Hearing back from our patients is one way we can continue to improve our services. Please take a few minutes to complete the written survey that you may receive in the mail after your visit with us. Thank you!             Your Updated Medication List - Protect others around you: Learn how to safely use, store and throw away your medicines at www.disposemymeds.org.          This list is accurate as of: 7/5/17 11:28 AM.  Always use your most recent med list.                   Brand Name Dispense Instructions for use Diagnosis    albuterol (2.5 MG/3ML) 0.083% neb solution     180 vial    Take 1 vial (2.5 mg) by nebulization every 6 hours as needed for shortness of breath / dyspnea or wheezing    Wheezing, SOB (shortness of breath)       FLUoxetine 20 MG capsule    PROzac    90 capsule    Take 1 capsule (20 mg) by mouth daily    Adjustment disorder with mixed anxiety and depressed mood       fluticasone 50 MCG/ACT spray    FLONASE    3 Package    Spray 1-2 sprays into both nostrils daily    Seasonal allergic rhinitis       levocetirizine 5 MG tablet    XYZAL    90 tablet    Take 1 tablet (5 mg) by mouth every evening    Chronic rhinitis       losartan 50 MG tablet    COZAAR    90 tablet    Take 1 tablet (50 mg) by mouth daily    Essential hypertension       omeprazole 40 MG capsule    priLOSEC    90 capsule    Take 1 capsule (40 mg) by mouth daily Take 30-60 minutes before a meal.    Cough       order for DME      Equipment ordered: RESMED Auto PAP Mask type: Nasal Settings: 5-15 CM H2O

## 2017-07-05 NOTE — NURSING NOTE
"See Roshan's goals for this visit include: Consult for right elbow fracture.   She requests these members of her care team be copied on today's visit information: yes    PCP: Marcia Clemens    Referring Provider:  No referring provider defined for this encounter.    Chief Complaint   Patient presents with     Consult     Right elbow injury. DOI 7/1/17. Skateboarding.        Initial /89  Pulse 73  Ht 1.499 m (4' 11\")  Wt 87.5 kg (193 lb)  BMI 38.98 kg/m2 Estimated body mass index is 38.98 kg/(m^2) as calculated from the following:    Height as of this encounter: 1.499 m (4' 11\").    Weight as of this encounter: 87.5 kg (193 lb).  Medication Reconciliation: complete  "

## 2017-07-05 NOTE — PROGRESS NOTES
3 DAY STM VISIT    Patient contacted for 3 day STM visit  Subjective measures:  Patient broke her arm hasn't started using CPAP yet.    Current settings: 5-15 cm H20.        Assessment: No usage reporting.  Action plan: Pt to have f/u 14 day STM visit.

## 2017-07-11 ENCOUNTER — RADIANT APPOINTMENT (OUTPATIENT)
Dept: GENERAL RADIOLOGY | Facility: CLINIC | Age: 37
End: 2017-07-11
Attending: FAMILY MEDICINE
Payer: COMMERCIAL

## 2017-07-11 ENCOUNTER — OFFICE VISIT (OUTPATIENT)
Dept: ORTHOPEDICS | Facility: CLINIC | Age: 37
End: 2017-07-11
Payer: COMMERCIAL

## 2017-07-11 VITALS — OXYGEN SATURATION: 96 % | SYSTOLIC BLOOD PRESSURE: 134 MMHG | HEART RATE: 89 BPM | DIASTOLIC BLOOD PRESSURE: 84 MMHG

## 2017-07-11 DIAGNOSIS — S52.124D CLOSED NONDISPLACED FRACTURE OF HEAD OF RIGHT RADIUS WITH ROUTINE HEALING, SUBSEQUENT ENCOUNTER: Primary | ICD-10-CM

## 2017-07-11 DIAGNOSIS — S52.123A RADIAL HEAD FRACTURE: ICD-10-CM

## 2017-07-11 PROCEDURE — 99213 OFFICE O/P EST LOW 20 MIN: CPT | Performed by: FAMILY MEDICINE

## 2017-07-11 PROCEDURE — 73080 X-RAY EXAM OF ELBOW: CPT | Mod: RT | Performed by: RADIOLOGY

## 2017-07-11 ASSESSMENT — PAIN SCALES - GENERAL: PAINLEVEL: MILD PAIN (3)

## 2017-07-11 NOTE — PATIENT INSTRUCTIONS
Thanks for coming today.  Ortho/Sports Medicine Clinic  [address] [place] [address]    To schedule future appointments in Ortho Clinic, you may call [phone].    To schedule ordered imaging by your provider:   Call Central Imaging [phone]    To schedule an injection ordered by your provider:  Call Central Imaging Injection scheduling line: [phone]  [employer] available online at:  [email or web address]    Please call if any further questions or concerns ([phone]).  Clinic hours 8 am to 5 pm.    Return to clinic (call) if symptoms worsen or fail to improve.

## 2017-07-11 NOTE — NURSING NOTE
"See Roshan's goals for this visit include: Follow up with right elbow fx  She requests these members of her care team be copied on today's visit information: yes    PCP: Marcia Clemens    Referring Provider:  ESTABLISHED PATIENT  No address on file    Chief Complaint   Patient presents with     RECHECK     Elbow right     Left elbow injury 07/01/2017       Initial /84 (BP Location: Left arm, Patient Position: Chair, Cuff Size: Adult Large)  Pulse 89  SpO2 96% Estimated body mass index is 38.98 kg/(m^2) as calculated from the following:    Height as of 7/5/17: 1.499 m (4' 11\").    Weight as of 7/5/17: 87.5 kg (193 lb).  Medication Reconciliation: complete    "

## 2017-07-11 NOTE — PROGRESS NOTES
"HISTORY OF PRESENT ILLNESS  Ms. Islas is a pleasant 36 year old year old female who presents to clinic today for follow up of her radial head fracture.  See has been feeling okay over the past week. Her pain has improved. She reports only having pain \"once in a while\", usually if her arm is hanging down for a long period of time. She stopped using her sling during the day time about 6 days ago and has not worn it at all in the past 4 days. She denies numbness or tingling. She does still notice some limitation to extending her arm. She has had someone at home helping her with passive ROM.    Additional history: as documented    REVIEW OF SYSTEMS (7/11/2017)  10 point ROS of systems including Constitutional, Eyes, Respiratory, Cardiovascular, Gastroenterology, Genitourinary, Integumentary, Musculoskeletal, Psychiatric were all negative except for pertinent positives noted in my HPI.     PHYSICAL EXAM  Vital Signs: /84 (BP Location: Left arm, Patient Position: Chair, Cuff Size: Adult Large)  Pulse 89  SpO2 96% Patient declined being weighed. There is no height or weight on file to calculate BMI.    General  - normal appearance, in no obvious distress  CV  - normal radial pulse  Pulm  - normal respiratory pattern, non-labored  Musculoskeletal -right  elbow  - inspection: no obvious deformity, no obvious soft tissue swelling  - palpation: tender with deep palpation of radial head  - ROM:  100 deg flexion active, 130 deg flexion passive   45 deg extension active,  20 deg extension passive   90 deg pronation   45 deg supination active, 60 deg supination passive  - strength: 4/5 elbow flexion, 4/5 elbow extension, 4/5 supination, 4/5 pronation, 5/5  strength   Neuro  - no sensory or motor deficit, grossly normal coordination, normal muscle tone  Skin  - no ecchymosis, erythema, warmth, or induration, no obvious rash  Psych  - interactive, appropriate, normal mood and affect      ASSESSMENT & PLAN  Ms. Islas is a 36 " year old year old female who is in the office today for follow up of right radial head fracture.    I ordered & reviewed right elbow imaging who shows good healing of her radial head fracture. Her pain and range of motion continue to improve.    See should continue to apply ice for 10-15 minutes every 2-3 hours as needed.    I recommend that See return to my office for a re-examination in 2 weeks.  She should follow up sooner if the condition worsens or if other problems arise.    It was a pleasure taking care of See.        Adam Garsia DO, CAQSM

## 2017-07-12 ENCOUNTER — MYC MEDICAL ADVICE (OUTPATIENT)
Dept: ORTHOPEDICS | Facility: CLINIC | Age: 37
End: 2017-07-12

## 2017-07-14 ENCOUNTER — MYC MEDICAL ADVICE (OUTPATIENT)
Dept: ORTHOPEDICS | Facility: CLINIC | Age: 37
End: 2017-07-14

## 2017-07-17 ENCOUNTER — DOCUMENTATION ONLY (OUTPATIENT)
Dept: SLEEP MEDICINE | Facility: CLINIC | Age: 37
End: 2017-07-17

## 2017-07-17 NOTE — PROGRESS NOTES
14 DAY [facility] VISIT    Message left for patient to return call    Assessment: Pt not meeting objective benchmarks for compliance. Patient not using she has a broken arm.   Action plan: Waiting for patient to return call and pt to have 30 day [facility] visit.   Device settings:    EPAP Min Auto CPAP: 5.0 (The minimum allowable pressure in cmH2O)    EPAP Max Auto CPAP: 15.0 (The maximum allowable pressure in cmH2O)    Target EPAP (95% of Target) 14 day average (Resmed): 11.2cm H20      Objective measures: 14 day rolling measure     % compliance greater than four hours rolling average 14 days: 0 %     95% OF Leak in litres Rolling Average 14 Days: 2.4 lpm last data upload        AHI Rolling Average 14 Day: 0  last data upload        Time mask on face 14 day average: 2 min         Objective measure goal  Compliance   Goal >70%  Leak   Goal < 10%  AHI  Goal < 5  Usage  Goal >240

## 2017-07-25 ENCOUNTER — DOCUMENTATION ONLY (OUTPATIENT)
Dept: SLEEP MEDICINE | Facility: CLINIC | Age: 37
End: 2017-07-25

## 2017-07-25 ENCOUNTER — OFFICE VISIT (OUTPATIENT)
Dept: ORTHOPEDICS | Facility: CLINIC | Age: 37
End: 2017-07-25
Payer: COMMERCIAL

## 2017-07-25 VITALS — DIASTOLIC BLOOD PRESSURE: 82 MMHG | SYSTOLIC BLOOD PRESSURE: 126 MMHG | OXYGEN SATURATION: 97 % | HEART RATE: 91 BPM

## 2017-07-25 DIAGNOSIS — S52.124D CLOSED NONDISPLACED FRACTURE OF HEAD OF RIGHT RADIUS WITH ROUTINE HEALING, SUBSEQUENT ENCOUNTER: Primary | ICD-10-CM

## 2017-07-25 PROCEDURE — 99212 OFFICE O/P EST SF 10 MIN: CPT | Performed by: FAMILY MEDICINE

## 2017-07-25 ASSESSMENT — PAIN SCALES - GENERAL: PAINLEVEL: MILD PAIN (2)

## 2017-07-25 NOTE — MR AVS SNAPSHOT
After Visit Summary   2017    See Roshan    MRN: [id number]           Patient Information     Date Of Birth          1980        Visit Information        Provider Department      2017 11:40 AM Adam Garsia,  [facility]        Today's Diagnoses     Closed nondisplaced fracture of head of right radius with routine healing, subsequent encounter    -  1      Care Instructions    Thanks for coming today.  Ortho/Sports Medicine Clinic  [address] [place] [address]    To schedule future appointments in Ortho Clinic, you may call [phone].    To schedule ordered imaging by your provider:   Call Central Imaging [phone]    To schedule an injection ordered by your provider:  Call Central Imaging Injection scheduling line: [phone]  [employer] available online at:  [email or web address]    Please call if any further questions or concerns ([phone]).  Clinic hours 8 am to 5 pm.    Return to clinic (call) if symptoms worsen or fail to improve.            Follow-ups after your visit        Your next 10 appointments already scheduled     2017  2:00 PM CDT   SLEEP DME MASK FITTING with CL SC DME   [facility] ([facility])    [address] [facility] [address] [phone]              Who to contact     If you have questions or need follow up information about today's clinic visit or your schedule please contact [facility] directly at [phone].  Normal or non-critical lab and imaging results will be communicated to you by MyChart, letter or phone within 4 business days after the clinic has received the results. If you do not hear from us within 7 days, please contact the clinic through [employer] or phone. If you have a critical or abnormal lab result, we will notify you by phone as soon as possible.  Submit refill requests through [employer] or call your pharmacy and  they will forward the refill request to us. Please allow 3 business days for your refill to be completed.          Additional Information About Your Visit        [employer] Information     [employer] gives you secure access to your electronic health record. If you see a primary care provider, you can also send messages to your care team and make appointments. If you have questions, please call your primary care clinic.  If you do not have a primary care provider, please call [phone] and they will assist you.      [employer] is an electronic gateway that provides easy, online access to your medical records. With [employer], you can request a clinic appointment, read your test results, renew a prescription or communicate with your care team.     To access your existing account, please contact your [facility] or call [phone] for assistance.        Care EveryWhere ID     This is your Care EveryWhere ID. This could be used by other organizations to access your [place] medical records  [id number]        Your Vitals Were     Pulse Pulse Oximetry                91 97%           Blood Pressure from Last 3 Encounters:   07/25/17 126/82   07/11/17 134/84   07/05/17 136/89    Weight from Last 3 Encounters:   07/05/17 87.5 kg (193 lb)   07/01/17 86.2 kg (190 lb)   05/04/17 87.5 kg (193 lb)              Today, you had the following     No orders found for display       Primary Care Provider Office Phone # Fax #    Marcia Clemens [phone]       [facility] [address] [facility] [address]        Equal Access to Services     AUGUSTO BECERRA : Hadii aad ku hadasho Soomaali, waaxda luqadaha, qaybta kaalmada adeegyada, perla fry . So [facility] [phone].    ATENCIÓN: Si habla español, tiene a woods disposición servicios gratuitos de asistencia lingüística. Llame al [phone].    We comply with applicable federal civil rights laws and  [place] laws. We do not discriminate on the basis of race, color, national origin, age, disability sex, sexual orientation or gender identity.            Thank you!     Thank you for choosing [facility]  for your care. Our goal is always to provide you with excellent care. Hearing back from our patients is one way we can continue to improve our services. Please take a few minutes to complete the written survey that you may receive in the mail after your visit with us. Thank you!             Your Updated Medication List - Protect others around you: Learn how to safely use, store and throw away your medicines at www.disposemymeds.org.          This list is accurate as of: 7/25/17 12:44 PM.  Always use your most recent med list.                   Brand Name Dispense Instructions for use Diagnosis    albuterol (2.5 MG/3ML) 0.083% neb solution     180 vial    Take 1 vial (2.5 mg) by nebulization every 6 hours as needed for shortness of breath / dyspnea or wheezing    Wheezing, SOB (shortness of breath)       FLUoxetine 20 MG capsule    PROzac    90 capsule    Take 1 capsule (20 mg) by mouth daily    Adjustment disorder with mixed anxiety and depressed mood       fluticasone 50 MCG/ACT spray    FLONASE    3 Package    Spray 1-2 sprays into both nostrils daily    Seasonal allergic rhinitis       levocetirizine 5 MG tablet    XYZAL    90 tablet    Take 1 tablet (5 mg) by mouth every evening    Chronic rhinitis       losartan 50 MG tablet    COZAAR    90 tablet    Take 1 tablet (50 mg) by mouth daily    Essential hypertension       omeprazole 40 MG capsule    priLOSEC    90 capsule    Take 1 capsule (40 mg) by mouth daily Take 30-60 minutes before a meal.    Cough       order for DME      Equipment ordered: RESMED Auto PAP Mask type: Nasal Settings: 5-15 CM H2O

## 2017-07-25 NOTE — PATIENT INSTRUCTIONS
Thanks for coming today.  Ortho/Sports Medicine Clinic  [address] [place] [address]    To schedule future appointments in Ortho Clinic, you may call [phone].    To schedule ordered imaging by your provider:   Call Central Imaging [phone]    To schedule an injection ordered by your provider:  Call Central Imaging Injection scheduling line: [phone]  [employer] available online at:  [email or web address]    Please call if any further questions or concerns ([phone]).  Clinic hours 8 am to 5 pm.    Return to clinic (call) if symptoms worsen or fail to improve.

## 2017-07-25 NOTE — PROGRESS NOTES
HISTORY OF PRESENT ILLNESS  Ms. Islas is a pleasant 36 year old year old female who presents to clinic today for follow up of her radial head fracture.  See has been feeling quite a bit better. She does still have some extension loss in her arm and some clicking with movement, although her pain is gone.  Additional history: as documented      REVIEW OF SYSTEMS (7/25/2017)  10 point ROS of systems including Constitutional, Eyes, Respiratory, Cardiovascular, Gastroenterology, Genitourinary, Integumentary, Musculoskeletal, Psychiatric were all negative except for pertinent positives noted in my HPI.     PHYSICAL EXAM  Vitals:    07/25/17 1157   BP: 126/82   BP Location: Right arm   Patient Position: Chair   Cuff Size: Adult Regular   Pulse: 91   SpO2: 97%     General  - normal appearance, in no obvious distress  CV  - normal radial pulse  Pulm  - normal respiratory pattern, non-labored  Musculoskeletal - right elbow  - inspection: normal joint alignment, no obvious deformity, no swelling  - palpation: no bony or soft tissue tenderness  - ROM:  160 deg flexion   Lacks 5 deg extension   90 deg pronation   90 deg supination  - strength: 5/5  strength, 5/5 flexion, extension, pronation, supination  Neuro  - no sensory or motor deficit, grossly normal coordination, normal muscle tone  Skin  - no ecchymosis, erythema, warmth, or induration, no obvious rash  Psych  - interactive, appropriate, normal mood and affect          ASSESSMENT & PLAN  Ms. Islas is a 36 year old year old female who is in the office today following up with a radial head fracture.    I signed paperwork allowing her to return to work with no restrictions.  She does have some physical therapy videos to do at home to help with range of motion, I do encourage this.  She can also ice as needed.    See can follow up as needed or if worsening or concerned.    It was a pleasure taking care of See.        Adam Garsia, DO, CAQSM

## 2017-07-25 NOTE — PROGRESS NOTES
Patient came to [facility] for mask fitting appointment on July 25, 2017. Patient requested to switch masks because oral breathing.  Patient tried on the followings masks: AIRFIT F20   Patient selected  Resmed, type AIRFIT X32Gdho Face mask Medium.

## 2017-07-25 NOTE — NURSING NOTE
"See Roshan's goals for this visit include: follow up with right elbow fx  She requests these members of her care team be copied on today's visit information: yes    PCP: Marcia Clemens    Referring Provider:  ESTABLISHED PATIENT  No address on file    Chief Complaint   Patient presents with     RECHECK     Follow up with right elbow fx       Initial /82 (BP Location: Right arm, Patient Position: Chair, Cuff Size: Adult Regular)  Pulse 91  SpO2 97% Estimated body mass index is 38.98 kg/(m^2) as calculated from the following:    Height as of 7/5/17: 1.499 m (4' 11\").    Weight as of 7/5/17: 87.5 kg (193 lb).  Medication Reconciliation: complete    "

## 2017-07-31 ENCOUNTER — DOCUMENTATION ONLY (OUTPATIENT)
Dept: SLEEP MEDICINE | Facility: CLINIC | Age: 37
End: 2017-07-31

## 2017-07-31 NOTE — PROGRESS NOTES
30 DAY [facility] VISIT    Message left for patient to return call     Assessment: Pt not meeting objective benchmarks for compliance. Will check on patient in two weeks.   Action plan: Waiting for patient to return call and pt to have 6 month [facility] visit.  Patient does not have a follow up visit.   Device type: Auto-CPAP  PAP settings: CPAP min 5 cm  H20     CPAP max 15 cm  H20    95th% pressure 13.5 cm  H20   Objective measures: 14 day rolling measures      Compliance  7 %      Leak  26.4 lpm  last  upload      AHI 3.28   last  upload      Average number of minutes 46    Diagnostic AHI: 21        Objective measure goal  Compliance   Goal >70%  Leak   Goal < 24 lpm  AHI  Goal < 5  Usage  Goal >240

## 2017-08-11 ENCOUNTER — DOCUMENTATION ONLY (OUTPATIENT)
Dept: SLEEP MEDICINE | Facility: CLINIC | Age: 37
End: 2017-08-11

## 2017-08-27 ENCOUNTER — HEALTH MAINTENANCE LETTER (OUTPATIENT)
Age: 37
End: 2017-08-27

## 2017-09-08 DIAGNOSIS — J30.2 SEASONAL ALLERGIC RHINITIS: ICD-10-CM

## 2017-09-08 RX ORDER — FLUTICASONE PROPIONATE 50 MCG
1-2 SPRAY, SUSPENSION (ML) NASAL DAILY
Qty: 3 BOTTLE | Refills: 1 | Status: SHIPPED | OUTPATIENT
Start: 2017-09-08 | End: 2020-09-02

## 2017-09-08 NOTE — TELEPHONE ENCOUNTER
Prescription approved per [facility] Refill Protocol.  Izabela Girard RN

## 2017-10-16 ENCOUNTER — OFFICE VISIT (OUTPATIENT)
Dept: FAMILY MEDICINE | Facility: CLINIC | Age: 37
End: 2017-10-16
Payer: COMMERCIAL

## 2017-10-16 VITALS
HEART RATE: 80 BPM | SYSTOLIC BLOOD PRESSURE: 122 MMHG | BODY MASS INDEX: 39.15 KG/M2 | WEIGHT: 194.2 LBS | DIASTOLIC BLOOD PRESSURE: 76 MMHG | HEIGHT: 59 IN

## 2017-10-16 DIAGNOSIS — Z13.6 CARDIOVASCULAR SCREENING; LDL GOAL LESS THAN 130: Primary | ICD-10-CM

## 2017-10-16 DIAGNOSIS — F43.23 ADJUSTMENT DISORDER WITH MIXED ANXIETY AND DEPRESSED MOOD: ICD-10-CM

## 2017-10-16 DIAGNOSIS — I10 ESSENTIAL HYPERTENSION: ICD-10-CM

## 2017-10-16 PROCEDURE — 99213 OFFICE O/P EST LOW 20 MIN: CPT | Performed by: FAMILY MEDICINE

## 2017-10-16 RX ORDER — FLUOXETINE 40 MG/1
40 CAPSULE ORAL DAILY
Qty: 90 CAPSULE | Refills: 1 | Status: SHIPPED | OUTPATIENT
Start: 2017-10-16 | End: 2018-04-25

## 2017-10-16 ASSESSMENT — PATIENT HEALTH QUESTIONNAIRE - PHQ9: SUM OF ALL RESPONSES TO PHQ QUESTIONS 1-9: 10

## 2017-10-16 NOTE — PATIENT INSTRUCTIONS
Helpguide.org for the anxiety '  Give the 40 mg of prozac a few weeks   If this isn't working then we can start another medication

## 2017-10-16 NOTE — LETTER
[facility]  [address] [place] [address]  [phone]        January 22, 2018    See Roshan  [address] [address]              Dear See Roshan    This is to remind you that your fasting labs is due.    You may call our office at [phone] to schedule an appointment.    Please disregard this notice if you have already had your labs drawn or made an appointment.        Sincerely,        Marcia Clemens MD

## 2017-10-16 NOTE — PROGRESS NOTES
SUBJECTIVE:                                                    Arturo Islas is a 36 year old female who presents to clinic today for the following health issues:    Depression and Anxiety Follow-Up    Status since last visit: Worsened     Other associated symptoms:chest pain    Complicating factors:     Significant life event: No     Current substance abuse: None    PHQ-9 Score and MyChart F/U Questions 10/16/2017   Total Score 10   Q9: Suicide Ideation Not at all     TERENCE-7 SCORE 1/9/2015   Total Score 19       PHQ-9  English  PHQ-9   Any Language  GAD7  Suicide Assessment Five-step Evaluation and Treatment (SAFE-T)      Amount of exercise or physical activity: None    Problems taking medications regularly: No    Medication side effects: none  Diet: regular (no restrictions)      Problem list and histories reviewed & adjusted, as indicated.  Additional history: feels stressed   When she takes the xyzal the cough is better       Patient Active Problem List   Diagnosis     CARDIOVASCULAR SCREENING; LDL GOAL LESS THAN 130     Hypertension     Infertility associated with anovulation     Obesity (BMI 30-39.9)     Menorrhagia     Health Care Home     Past Surgical History:   Procedure Laterality Date     ESOPHAGOSCOPY, GASTROSCOPY, DUODENOSCOPY (EGD), COMBINED N/A 7/16/2015    Procedure: COMBINED ESOPHAGOSCOPY, GASTROSCOPY, DUODENOSCOPY (EGD), BIOPSY SINGLE OR MULTIPLE;  Surgeon: Keagan Lozano MD;  Location: [facility]     INJECT EPIDURAL CAUDAL  6/25/2012    Procedure: INJECT EPIDURAL CAUDAL;  ANUEL Caudal--;  Surgeon: Provider, Generic Anesthesia;  Location: [facility]     [place] teeth pulled one  2008       Social History   Substance Use Topics     Smoking status: Former Smoker     Packs/day: 0.50     Years: 10.00     Types: Cigarettes     Quit date: 1/1/2005     Smokeless tobacco: Never Used     Alcohol use No     Family History   Problem Relation Age of Onset     DIABETES Mother      Hypertension Mother      Arthritis  "Mother      Cardiovascular Father      Hypertension Father      Hypertension Brother      Hypertension Brother      Hypertension Brother      Hypertension Brother      Hypertension Sister      Hypertension Sister      Hypertension Brother              ROS:  Constitutional, HEENT, cardiovascular, pulmonary, gi and gu systems are negative, except as otherwise noted.      OBJECTIVE:                                                    /76 (BP Location: Right arm, Patient Position: Chair, Cuff Size: Adult Regular)  Pulse 80  Ht 4' 11\" (1.499 m)  Wt 194 lb 3.2 oz (88.1 kg)  BMI 39.22 kg/m2 Body mass index is 39.22 kg/(m^2).   GENERAL: healthy, alert, well nourished, well hydrated, no distress  HENT: ear canals- normal; TMs- normal; Nose- normal; Mouth- no ulcers, no lesions  NECK: no tenderness, no adenopathy, no asymmetry, no masses, no stiffness; thyroid- normal to palpation  RESP: lungs clear to auscultation - no rales, no rhonchi, no wheezes  CV: regular rates and rhythm, normal S1 S2, no S3 or S4 and no murmur, no click or rub -  ABDOMEN: soft, no tenderness, no  hepatosplenomegaly, no masses, normal bowel sounds       ASSESSMENT/PLAN:                                                    1. CARDIOVASCULAR SCREENING; LDL GOAL LESS THAN 130    - Lipid panel reflex to direct LDL; Future    2. Essential hypertension  Well controlled   - Basic metabolic panel; Future    3. Adjustment disorder with mixed anxiety and depressed mood  Will increase prozac  Patient Instructions   Helpguide.org for the anxiety '  Give the 40 mg of prozac a few weeks   If this isn't working then we can start another medication         - FLUoxetine (PROZAC) 40 MG capsule; Take 1 capsule (40 mg) by mouth daily  Dispense: 90 capsule; Refill: 1     reports that she quit smoking about 12 years ago. Her smoking use included Cigarettes. She has a 5.00 pack-year smoking history. She has never used smokeless tobacco.          Marcia Clemens, " M.D.  [facility]

## 2017-10-16 NOTE — NURSING NOTE
"Chief Complaint   Patient presents with     Anxiety       Initial /76 (BP Location: Right arm, Patient Position: Chair, Cuff Size: Adult Regular)  Pulse 80  Ht 4' 11\" (1.499 m)  Wt 194 lb 3.2 oz (88.1 kg)  BMI 39.22 kg/m2 Estimated body mass index is 39.22 kg/(m^2) as calculated from the following:    Height as of this encounter: 4' 11\" (1.499 m).    Weight as of this encounter: 194 lb 3.2 oz (88.1 kg).  Medication Reconciliation: complete   Lenora Barclay CMA    "

## 2017-10-16 NOTE — MR AVS SNAPSHOT
After Visit Summary   10/16/2017    See Roshan    MRN: [id number]           Patient Information     Date Of Birth          1980        Visit Information        Provider Department      10/16/2017 5:30 PM Marcia Clemens MD [facility]        Today's Diagnoses     CARDIOVASCULAR SCREENING; LDL GOAL LESS THAN 130    -  1    Essential hypertension        Adjustment disorder with mixed anxiety and depressed mood          Care Instructions    Helpguide.org for the anxiety '  Give the 40 mg of prozac a few weeks   If this isn't working then we can start another medication             Follow-ups after your visit        Future tests that were ordered for you today     Open Future Orders        Priority Expected Expires Ordered    Basic metabolic panel Routine  1/16/2018 10/16/2017    Lipid panel reflex to direct LDL Routine  1/16/2018 10/16/2017            Who to contact     Normal or non-critical lab and imaging results will be communicated to you by [employer], letter or phone within 4 business days after the clinic has received the results. If you do not hear from us within 7 days, please contact the clinic through [employer] or phone. If you have a critical or abnormal lab result, we will notify you by phone as soon as possible.  Submit refill requests through [employer] or call your pharmacy and they will forward the refill request to us. Please allow 3 business days for your refill to be completed.          If you need to speak with a  for additional information , please call: [phone]             Additional Information About Your Visit        [employer] Information     [employer] gives you secure access to your electronic health record. If you see a primary care provider, you can also send messages to your care team and make appointments. If you have questions, please call your primary care clinic.  If you do not have a primary care provider, please call [phone] and they will  "assist you.        Care EveryWhere ID     This is your Care EveryWhere ID. This could be used by other organizations to access your [facility] medical records  [id number]        Your Vitals Were     Pulse Height BMI (Body Mass Index)             80 4' 11\" (1.499 m) 39.22 kg/m2          Blood Pressure from Last 3 Encounters:   10/16/17 122/76   07/25/17 126/82   07/11/17 134/84    Weight from Last 3 Encounters:   10/16/17 194 lb 3.2 oz (88.1 kg)   07/05/17 193 lb (87.5 kg)   07/01/17 190 lb (86.2 kg)                 Today's Medication Changes          These changes are accurate as of: 10/16/17  6:17 PM.  If you have any questions, ask your nurse or doctor.               These medicines have changed or have updated prescriptions.        Dose/Directions    * FLUoxetine 20 MG capsule   Commonly known as:  PROzac   This may have changed:  Another medication with the same name was added. Make sure you understand how and when to take each.   Used for:  Adjustment disorder with mixed anxiety and depressed mood        Dose:  20 mg   Take 1 capsule (20 mg) by mouth daily   Quantity:  90 capsule   Refills:  3       * FLUoxetine 40 MG capsule   Commonly known as:  PROzac   This may have changed:  You were already taking a medication with the same name, and this prescription was added. Make sure you understand how and when to take each.   Used for:  Adjustment disorder with mixed anxiety and depressed mood        Dose:  40 mg   Take 1 capsule (40 mg) by mouth daily   Quantity:  90 capsule   Refills:  1       * Notice:  This list has 2 medication(s) that are the same as other medications prescribed for you. Read the directions carefully, and ask your doctor or other care provider to review them with you.         Where to get your medicines      These medications were sent to [facility] [place], [place] - [address]  [address] [place] [place] [address]     Phone:  [phone]     FLUoxetine 40 MG capsule "                Primary Care Provider Office Phone # Fax #    Marcia Clemens [phone] [address] [facility] [address]        Equal Access to Services     ANTONIO LAINEZLINDSAY : Vanessa mine mejias evangelist Romero, waaxda luqadaha, qaybta kaalmada kemal, perla gomeznusrat daniel. So [facility] [phone].    ATENCIÓN: Si habla español, tiene a woods disposición servicios gratuitos de asistencia lingüística. Llame al [phone].    We comply with applicable federal civil rights laws and [place] laws. We do not discriminate on the basis of race, color, national origin, age, disability, sex, sexual orientation, or gender identity.            Thank you!     Thank you for choosing [facility]  for your care. Our goal is always to provide you with excellent care. Hearing back from our patients is one way we can continue to improve our services. Please take a few minutes to complete the written survey that you may receive in the mail after your visit with us. Thank you!             Your Updated Medication List - Protect others around you: Learn how to safely use, store and throw away your medicines at www.disposemymeds.org.          This list is accurate as of: 10/16/17  6:17 PM.  Always use your most recent med list.                   Brand Name Dispense Instructions for use Diagnosis    albuterol (2.5 MG/3ML) 0.083% neb solution     180 vial    Take 1 vial (2.5 mg) by nebulization every 6 hours as needed for shortness of breath / dyspnea or wheezing    Wheezing, SOB (shortness of breath)       * FLUoxetine 20 MG capsule    PROzac    90 capsule    Take 1 capsule (20 mg) by mouth daily    Adjustment disorder with mixed anxiety and depressed mood       * FLUoxetine 40 MG capsule    PROzac    90 capsule    Take 1 capsule (40 mg) by mouth daily    Adjustment disorder with mixed anxiety and depressed mood       fluticasone 50 MCG/ACT spray    FLONASE    3 Bottle    Spray 1-2 sprays  into both nostrils daily    Seasonal allergic rhinitis       levocetirizine 5 MG tablet    XYZAL    90 tablet    Take 1 tablet (5 mg) by mouth every evening    Chronic rhinitis       losartan 50 MG tablet    COZAAR    90 tablet    Take 1 tablet (50 mg) by mouth daily    Essential hypertension       order for DME      Equipment ordered: RESMED Auto PAP Mask type: Nasal Settings: 5-15 CM H2O        * Notice:  This list has 2 medication(s) that are the same as other medications prescribed for you. Read the directions carefully, and ask your doctor or other care provider to review them with you.

## 2017-11-10 ENCOUNTER — ALLIED HEALTH/NURSE VISIT (OUTPATIENT)
Dept: FAMILY MEDICINE | Facility: CLINIC | Age: 37
End: 2017-11-10
Payer: COMMERCIAL

## 2017-11-10 DIAGNOSIS — Z11.1 SCREENING EXAMINATION FOR PULMONARY TUBERCULOSIS: Primary | ICD-10-CM

## 2017-11-10 PROCEDURE — 99207 ZZC NO CHARGE NURSE ONLY: CPT

## 2017-11-10 PROCEDURE — 86580 TB INTRADERMAL TEST: CPT

## 2017-12-29 DIAGNOSIS — I10 ESSENTIAL HYPERTENSION: ICD-10-CM

## 2018-01-05 RX ORDER — LOSARTAN POTASSIUM 50 MG/1
50 TABLET ORAL DAILY
Qty: 90 TABLET | Refills: 0 | Status: SHIPPED | OUTPATIENT
Start: 2018-01-05 | End: 2018-04-16

## 2018-01-05 NOTE — TELEPHONE ENCOUNTER
Medication is being filled for 1 time refill only due to:  Patient needs labs before next refill.  Izabela Girard RN

## 2018-01-10 ENCOUNTER — DOCUMENTATION ONLY (OUTPATIENT)
Dept: SLEEP MEDICINE | Facility: CLINIC | Age: 38
End: 2018-01-10
Payer: COMMERCIAL

## 2018-01-10 NOTE — PROGRESS NOTES
6 month [facility] Recheck Visit     Data only recheck     Assessment: Pt not meeting objective benchmarks for compliance. Patient not using her machine. Patient was previously in the [place].   Action plan:  Pt to follow up per provider request (1-2 yrs)      Device type: Auto-CPAP    PAP settings: CPAP min 5 cm  H20     CPAP max 15 cm  H20  Objective measures: 14 day rolling measures      Compliance  0 %      Leak   lpm  last  upload      AHI    last  upload      Average number of minutes 0      Objective measure goal  Compliance   Goal >70%  Leak   Goal < 24 lpm  AHI  Goal < 5  Usage  Goal >240

## 2018-02-27 ENCOUNTER — TELEPHONE (OUTPATIENT)
Dept: LAB | Facility: CLINIC | Age: 38
End: 2018-02-27

## 2018-04-16 DIAGNOSIS — I10 ESSENTIAL HYPERTENSION: ICD-10-CM

## 2018-04-17 NOTE — TELEPHONE ENCOUNTER
Spoke with patient and she has enough pills to last until tomorrow. She said that she will come for fasting lab tomorrow morning. LAb appointment made.  Magno Forte RN

## 2018-04-17 NOTE — TELEPHONE ENCOUNTER
"LOSARTAN POTASSIUM 50MG TABS        Last Written Prescription Date:  1/5/18  Last Fill Quantity: 90,   # refills: 0  Last Office Visit: 10/16/17  Future Office visit:       Requested Prescriptions   Pending Prescriptions Disp Refills     losartan (COZAAR) 50 MG tablet [Pharmacy Med Name: LOSARTAN POTASSIUM 50MG TABS] 90 tablet 0     Sig: TAKE ONE TABLET BY MOUTH EVERY DAY (NEEDS LABS BEFORE NEXT REFILL)    Angiotensin-II Receptors Failed    4/17/2018  9:01 AM       Failed - Normal serum creatinine on file in past 12 months    Recent Labs   Lab Test  03/06/14   1726   CR  0.69            Failed - Normal serum potassium on file in past 12 months    Recent Labs   Lab Test  03/06/14   1726   POTASSIUM  4.3                   Passed - Blood pressure under 140/90 in past 12 months    BP Readings from Last 3 Encounters:   10/16/17 122/76   07/25/17 126/82   07/11/17 134/84                Passed - Recent (12 mo) or future (30 days) visit within the authorizing provider's specialty    Patient had office visit in the last 12 months or has a visit in the next 30 days with authorizing provider or within the authorizing provider's specialty.  See \"Patient Info\" tab in inbasket, or \"Choose Columns\" in Meds & Orders section of the refill encounter.           Passed - Patient is age 18 or older       Passed - No active pregnancy on record       Passed - No positive pregnancy test in past 12 months          "

## 2018-04-18 DIAGNOSIS — I10 ESSENTIAL HYPERTENSION: ICD-10-CM

## 2018-04-18 DIAGNOSIS — Z13.6 CARDIOVASCULAR SCREENING; LDL GOAL LESS THAN 130: ICD-10-CM

## 2018-04-18 LAB
ANION GAP SERPL CALCULATED.3IONS-SCNC: 5 MMOL/L (ref 3–14)
BUN SERPL-MCNC: 16 MG/DL (ref 7–30)
CALCIUM SERPL-MCNC: 8.6 MG/DL (ref 8.5–10.1)
CHLORIDE SERPL-SCNC: 103 MMOL/L (ref 94–109)
CHOLEST SERPL-MCNC: 183 MG/DL
CO2 SERPL-SCNC: 28 MMOL/L (ref 20–32)
CREAT SERPL-MCNC: 0.57 MG/DL (ref 0.52–1.04)
GFR SERPL CREATININE-BSD FRML MDRD: >90 ML/MIN/1.7M2
GLUCOSE SERPL-MCNC: 95 MG/DL (ref 70–99)
HDLC SERPL-MCNC: 51 MG/DL
LDLC SERPL CALC-MCNC: 116 MG/DL
NONHDLC SERPL-MCNC: 132 MG/DL
POTASSIUM SERPL-SCNC: 3.7 MMOL/L (ref 3.4–5.3)
SODIUM SERPL-SCNC: 136 MMOL/L (ref 133–144)
TRIGL SERPL-MCNC: 82 MG/DL

## 2018-04-18 PROCEDURE — 80061 LIPID PANEL: CPT | Performed by: FAMILY MEDICINE

## 2018-04-18 PROCEDURE — 36415 COLL VENOUS BLD VENIPUNCTURE: CPT | Performed by: FAMILY MEDICINE

## 2018-04-18 PROCEDURE — 80048 BASIC METABOLIC PNL TOTAL CA: CPT | Performed by: FAMILY MEDICINE

## 2018-04-18 RX ORDER — LOSARTAN POTASSIUM 50 MG/1
TABLET ORAL
Qty: 90 TABLET | Refills: 0 | Status: SHIPPED | OUTPATIENT
Start: 2018-04-18 | End: 2018-07-16

## 2018-04-18 NOTE — TELEPHONE ENCOUNTER
"Routing refill request to provider for review/approval because:  Patient had fasting lab this morning. It is \"in process\" at this time.   Magno Forte RN          "

## 2018-04-19 NOTE — PROGRESS NOTES
See,  Your lab results were normal/stable. Please feel free to my chart or call the office with questions. Marcia Clemens M.D.

## 2018-04-25 DIAGNOSIS — F43.23 ADJUSTMENT DISORDER WITH MIXED ANXIETY AND DEPRESSED MOOD: ICD-10-CM

## 2018-04-25 NOTE — TELEPHONE ENCOUNTER
Medication is being filled for 1 time refill only due to:  Patient needs to be seen because needs follow up appt and phq9 before further refills.   Magno Forte RN

## 2018-04-25 NOTE — TELEPHONE ENCOUNTER
"FLUOXETINE HCL 20MG CAPS        Last Written Prescription Date:  12/13/16  Last Fill Quantity: 90,   # refills: 3  Last Office Visit: 10/16/17  Future Office visit:       Requested Prescriptions   Pending Prescriptions Disp Refills     FLUoxetine (PROZAC) 20 MG capsule [Pharmacy Med Name: FLUOXETINE HCL 20MG CAPS] 180 capsule 1     Sig: TAKE TWO CAPSULES BY MOUTH EVERY DAY    SSRIs Protocol Passed    4/25/2018 10:50 AM       Passed - Recent (12 mo) or future (30 days) visit within the authorizing provider's specialty    Patient had office visit in the last 12 months or has a visit in the next 30 days with authorizing provider or within the authorizing provider's specialty.  See \"Patient Info\" tab in inbasket, or \"Choose Columns\" in Meds & Orders section of the refill encounter.           Passed - Patient is age 18 or older       Passed - No active pregnancy on record       Passed - No positive pregnancy test in last 12 months          "

## 2018-06-15 ENCOUNTER — ALLIED HEALTH/NURSE VISIT (OUTPATIENT)
Dept: FAMILY MEDICINE | Facility: CLINIC | Age: 38
End: 2018-06-15
Payer: COMMERCIAL

## 2018-06-15 VITALS — DIASTOLIC BLOOD PRESSURE: 84 MMHG | SYSTOLIC BLOOD PRESSURE: 136 MMHG

## 2018-06-15 DIAGNOSIS — I10 HYPERTENSION: Primary | ICD-10-CM

## 2018-06-15 PROCEDURE — 99207 ZZC NO CHARGE NURSE ONLY: CPT | Performed by: FAMILY MEDICINE

## 2018-06-15 NOTE — MR AVS SNAPSHOT
After Visit Summary   6/15/2018    See Roshan    MRN: [id number]           Patient Information     Date Of Birth          1980        Visit Information        Provider Department      6/15/2018 2:16 PM Marcia Clemens MD [facility]        Today's Diagnoses     Hypertension    -  1       Follow-ups after your visit        Who to contact     Normal or non-critical lab and imaging results will be communicated to you by MyChart, letter or phone within 4 business days after the clinic has received the results. If you do not hear from us within 7 days, please contact the clinic through MyChart or phone. If you have a critical or abnormal lab result, we will notify you by phone as soon as possible.  Submit refill requests through [employer] or call your pharmacy and they will forward the refill request to us. Please allow 3 business days for your refill to be completed.          If you need to speak with a  for additional information , please call: [phone]             Additional Information About Your Visit        [employer] Information     [employer] gives you secure access to your electronic health record. If you see a primary care provider, you can also send messages to your care team and make appointments. If you have questions, please call your primary care clinic.  If you do not have a primary care provider, please call [phone] and they will assist you.        Care EveryWhere ID     This is your Care EveryWhere ID. This could be used by other organizations to access your [facility] medical records  [id number]         Blood Pressure from Last 3 Encounters:   06/15/18 136/84   10/16/17 122/76   07/25/17 126/82    Weight from Last 3 Encounters:   10/16/17 194 lb 3.2 oz (88.1 kg)   07/05/17 193 lb (87.5 kg)   07/01/17 190 lb (86.2 kg)              Today, you had the following     No orders found for display       Primary Care Provider Office Phone # Fax #    Marcia CHAVIRA  MD Jayleen [phone]       [address] [facility] [address]        Equal Access to Services     ANTONIO BECERRA : Vanessa aad ku hadjessica Nealali, wagretchenda ariismaelha, lexi kaalethada kemal, perla wallace roloalondra sanchez. So [facility] [phone].    ATENCIÓN: Si habla español, tiene a woods disposición servicios gratuitos de asistencia lingüística. Llame al [phone].    We comply with applicable federal civil rights laws and [place] laws. We do not discriminate on the basis of race, color, national origin, age, disability, sex, sexual orientation, or gender identity.            Thank you!     Thank you for choosing [facility]  for your care. Our goal is always to provide you with excellent care. Hearing back from our patients is one way we can continue to improve our services. Please take a few minutes to complete the written survey that you may receive in the mail after your visit with us. Thank you!             Your Updated Medication List - Protect others around you: Learn how to safely use, store and throw away your medicines at www.disposemymeds.org.          This list is accurate as of 6/15/18  2:18 PM.  Always use your most recent med list.                   Brand Name Dispense Instructions for use Diagnosis    albuterol (2.5 MG/3ML) 0.083% neb solution     180 vial    Take 1 vial (2.5 mg) by nebulization every 6 hours as needed for shortness of breath / dyspnea or wheezing    Wheezing, SOB (shortness of breath)       * FLUoxetine 20 MG capsule    PROzac    90 capsule    Take 1 capsule (20 mg) by mouth daily    Adjustment disorder with mixed anxiety and depressed mood       * FLUoxetine 20 MG capsule    PROzac    180 capsule    TAKE TWO CAPSULES BY MOUTH EVERY DAY    Adjustment disorder with mixed anxiety and depressed mood       fluticasone 50 MCG/ACT spray    FLONASE    3 Bottle    Spray 1-2 sprays into both nostrils daily    Seasonal allergic rhinitis        levocetirizine 5 MG tablet    XYZAL    90 tablet    Take 1 tablet (5 mg) by mouth every evening    Chronic rhinitis       losartan 50 MG tablet    COZAAR    90 tablet    TAKE ONE TABLET BY MOUTH EVERY DAY (NEEDS LABS BEFORE NEXT REFILL)    Essential hypertension       order for DME      Equipment ordered: RESMED Auto PAP Mask type: Nasal Settings: 5-15 CM H2O        * Notice:  This list has 2 medication(s) that are the same as other medications prescribed for you. Read the directions carefully, and ask your doctor or other care provider to review them with you.

## 2018-06-15 NOTE — NURSING NOTE
"See Roshan is enrolled/participating in the retail pharmacy Blood Pressure Goals Achievement Program (BPGAP).  See Roshan was evaluated at [facility] on Gege 15, 2018 at which time her blood pressure was:    BP Readings from Last 3 Encounters:   06/15/18 136/84   10/16/17 122/76   07/25/17 126/82     Reviewed lifestyle modifications for blood pressure control and reduction: including making healthy food choices, managing weight, getting regular exercise, smoking cessation, reducing alcohol consumption, monitoring blood pressure regularly.     See Roshan is not experiencing symptoms.  She notes that she has felt dizzy for about a month - wanted to check BP to see if that could be a cause.  After BP checked, she stated, \"It must be something else then\"    Follow-Up: BP is at goal of < 140/90mmHg (patient 18+ years of age with or without diabetes).  Recommended follow-up at pharmacy in 6 months.     Recommendation to Provider: None    Arturo Islas was evaluated for enrollment into the PGEN study today.    Patient eligible for enrollment:  No  Patient interested in enrollment:  No    Completed by: Daniela Mancini, PharmD  [facility]  [phone]      "

## 2018-07-16 DIAGNOSIS — I10 ESSENTIAL HYPERTENSION: ICD-10-CM

## 2018-07-16 NOTE — TELEPHONE ENCOUNTER
"LOSARTAN POTASSIUM 50MG TABS        Last Written Prescription Date:  4/18/18  Last Fill Quantity: 90,   # refills: 0  Last Office Visit: 10/16/17  Future Office visit:       Requested Prescriptions   Pending Prescriptions Disp Refills     losartan (COZAAR) 50 MG tablet [Pharmacy Med Name: LOSARTAN POTASSIUM 50MG TABS] 90 tablet 0     Sig: TAKE ONE TABLET BY MOUTH EVERY DAY (NEEDS LABS BEFORE NEXT REFILL)    Angiotensin-II Receptors Passed    7/16/2018  4:10 PM       Passed - Blood pressure under 140/90 in past 12 months    BP Readings from Last 3 Encounters:   06/15/18 136/84   10/16/17 122/76   07/25/17 126/82                Passed - Recent (12 mo) or future (30 days) visit within the authorizing provider's specialty    Patient had office visit in the last 12 months or has a visit in the next 30 days with authorizing provider or within the authorizing provider's specialty.  See \"Patient Info\" tab in inbasket, or \"Choose Columns\" in Meds & Orders section of the refill encounter.           Passed - Patient is age 18 or older       Passed - No active pregnancy on record       Passed - Normal serum creatinine on file in past 12 months    Recent Labs   Lab Test  04/18/18   0824   CR  0.57            Passed - Normal serum potassium on file in past 12 months    Recent Labs   Lab Test  04/18/18   0824   POTASSIUM  3.7                   Passed - No positive pregnancy test in past 12 months            "

## 2018-07-17 RX ORDER — LOSARTAN POTASSIUM 50 MG/1
50 TABLET ORAL DAILY
Qty: 90 TABLET | Refills: 0 | Status: SHIPPED | OUTPATIENT
Start: 2018-07-17 | End: 2018-08-03

## 2018-07-17 NOTE — TELEPHONE ENCOUNTER
Prescription approved per [facility] Refill Protocol.  Ivania OCHOA RN

## 2018-08-03 ENCOUNTER — OFFICE VISIT (OUTPATIENT)
Dept: FAMILY MEDICINE | Facility: CLINIC | Age: 38
End: 2018-08-03
Payer: COMMERCIAL

## 2018-08-03 VITALS
TEMPERATURE: 98.6 F | SYSTOLIC BLOOD PRESSURE: 122 MMHG | DIASTOLIC BLOOD PRESSURE: 88 MMHG | HEART RATE: 88 BPM | BODY MASS INDEX: 41.73 KG/M2 | HEIGHT: 59 IN | WEIGHT: 207 LBS

## 2018-08-03 DIAGNOSIS — I10 ESSENTIAL HYPERTENSION: ICD-10-CM

## 2018-08-03 DIAGNOSIS — G56.03 BILATERAL CARPAL TUNNEL SYNDROME: Primary | ICD-10-CM

## 2018-08-03 PROCEDURE — 99213 OFFICE O/P EST LOW 20 MIN: CPT | Performed by: FAMILY MEDICINE

## 2018-08-03 RX ORDER — LOSARTAN POTASSIUM 50 MG/1
50 TABLET ORAL DAILY
Qty: 90 TABLET | Refills: 3 | Status: SHIPPED | OUTPATIENT
Start: 2018-08-03 | End: 2019-11-03

## 2018-08-03 ASSESSMENT — ANXIETY QUESTIONNAIRES
1. FEELING NERVOUS, ANXIOUS, OR ON EDGE: SEVERAL DAYS
6. BECOMING EASILY ANNOYED OR IRRITABLE: NEARLY EVERY DAY
GAD7 TOTAL SCORE: 6
7. FEELING AFRAID AS IF SOMETHING AWFUL MIGHT HAPPEN: NOT AT ALL
3. WORRYING TOO MUCH ABOUT DIFFERENT THINGS: SEVERAL DAYS
5. BEING SO RESTLESS THAT IT IS HARD TO SIT STILL: NOT AT ALL
2. NOT BEING ABLE TO STOP OR CONTROL WORRYING: SEVERAL DAYS

## 2018-08-03 ASSESSMENT — PATIENT HEALTH QUESTIONNAIRE - PHQ9: 5. POOR APPETITE OR OVEREATING: NOT AT ALL

## 2018-08-03 NOTE — PATIENT INSTRUCTIONS
Futuro energizing wrist splint                      Carpal Tunnel Syndrome               What is carpal tunnel syndrome?   Carpal tunnel syndrome is a common, painful disorder of the wrist and hand.   How does it occur?   Carpal tunnel syndrome is caused by pressure on the median nerve in your wrist. People who use their hands and wrists in the same motion over and over tend to develop carpal tunnel syndrome. It is common in cashiers, , assembly-line workers, and people who work on the computer.   Swelling from a broken bone or other injury can cause pressure on the nerve as well. Diseases such as arthritis, diabetes, or hypothyroidism can cause swelling and pressure in the wrist. Sometimes it happens during pregnancy.   What are the symptoms?   The symptoms include:   pain, numbness, or tingling in your hand and wrist, especially in the thumb and index and middle fingers; pain may radiate up into the forearm   increased pain with increased use of your hand, such as when you are driving or reading the newspaper   increased pain at night   weak  and tendency to drop objects held in the hand   sensitivity to cold   muscle deterioration especially in the thumb (in later stages)   How is it diagnosed?   Your healthcare provider will review your symptoms, examine you, and discuss the ways you use your hands. He or she may also do the following tests:   Your provider may tap the inside middle of your wrist over the median nerve. You may feel pain or a sensation like an electric shock.   You may be asked to bend your wrist down for one minute to see if this causes symptoms.   Your provider may arrange to test the response of your nerves and muscles to electrical stimulation.   How is it treated?   If you have a disease that is causing carpal tunnel syndrome (such as rheumatoid arthritis), treating the disease may relieve your symptoms.   Other treatment focuses on relieving irritation and pressure on the  nerve in your wrist. To relieve pressure your healthcare provider may suggest:   restricting use of your hand or changing the way you use it   changing the position of your desk, computer, and chair to one that irritates your wrist less   wearing a wrist splint   exercises   Your provider may prescribe a steroid medicine or a nonsteroidal anti-inflammatory medicine, such as ibuprofen. Nonsteroidal anti-inflammatory medicines (NSAIDs) may cause stomach bleeding and other problems. These risks increase with age. Read the label and take as directed. Unless recommended by your healthcare provider, do not take for more than 10 days.   Your provider may give you an injection of a corticosteroid medicine.   In some cases surgery may be necessary.   How long will the effects last?   How long the symptoms of carpal tunnel syndrome last depends on the cause and your response to treatment. Sometimes the symptoms go away without any treatment, or they may be relieved by nonsurgical treatment. Surgery may be needed to relieve the symptoms if they do not respond to treatment or they get worse. Surgery usually relieves the symptoms, especially if there is no permanent damage to the nerve.   Symptoms of carpal tunnel syndrome that occur during pregnancy usually disappear following delivery.   How can I take care of myself?   Follow your healthcare provider's recommendations. Also try the following:   Raise your arm on a pillow when you sit or lie down.   Avoid activities that overuse your hand.   When you use a computer mouse, use it with the hand that does not have carpal tunnel syndrome.   Find a different way to use your hand by using another tool or try to use the other hand.   Avoid bending your wrists.   When can I return to my normal activities?   Everyone recovers from an injury at a different rate. Return to your activities depends on how soon your wrist recovers, not by how many days or weeks it has been since your injury  has occurred. In general, the longer you have symptoms before you start treatment, the longer it will take to get better. The goal is to return to your normal activities as soon as is safely possible. If you return too soon you may worsen your injury.   You may return to your activities when you are able to painlessly  objects and have full range of motion and strength back in your wrist.   What can I do to help prevent carpal tunnel syndrome?   Make sure that your hands and wrists are supported, especially if you do repetitive work. Take regular breaks from the repetitive motion. Do not rest your wrists on hard or ridged surfaces for long periods of time.   In some cases the cause is not known and carpal tunnel syndrome cannot be prevented.            Carpal Tunnel Rehabilitation Exercise            You may do all of these exercises right away.   Wrist Range of Motion   0. Flexion: Gently bend your wrist forward. Hold for 5 seconds. Do 3 sets of 10.   0. Extension: Gently bend your wrist backward. Hold this position 5 seconds. Do 3 sets of 10.   0. Side to side: Gently move your wrist from side to side (a handshake motion). Hold for 5 seconds in each direction. Do 3 sets of 10.   Wrist stretch: Press the back of the hand on your injured side with your other hand to help bend your wrist. Hold for 15 to 30 seconds. Next, stretch the hand back by pressing the fingers in a backward direction. Hold for 15 to 30 seconds. Keep the arm on your injured side straight during this exercise. Do 3 sets.   Mid-trap exercise: Lie on your stomach on a firm surface and place a folded pillow underneath your chest. Place your arms out straight to your sides with your elbows straight and thumbs toward the ceiling. Slowly raise your arms toward the ceiling as you squeeze your shoulder blades together. Lower slowly. Do 3 sets of 15. As the exercise gets easier to do, hold soup cans or small weights in your hands.   Pectoralis  stretch:  a doorway or corner with both hands slightly above your head on the door frame or wall. Slowly lean forward until you feel a stretch in the front of your shoulders. Hold 15 to 30 seconds. Repeat 3 times.   Scalene stretch: Sit or stand and clasp both hands behind your back. Lower your left shoulder and tilt your head toward the right until you feel a stretch. Hold this position for 15 to 30 seconds and then come back to the starting position. Then lower your right shoulder and tilt your head toward the left. Hold for 15 to 30 seconds. Repeat 3 times on each side.   Thoracic extension: While sitting in a chair, clasp both arms behind your head. Gently arch backward and look up toward the ceiling. Repeat 10 times. Do this several times each day.   Scapular squeeze: While sitting or standing with your arms by your sides, squeeze your shoulder blades together and hold for 5 seconds. Do 3 sets of 10.   Wrist extension: Hold a soup can or hammer handle in your hand with your palm facing down. Slowly bend your wrist up. Slowly lower the weight down into the starting position. Do 3 sets of 10. Gradually increase the weight of the object you are holding.    strengthening: Squeeze a soft rubber ball and hold the squeeze for 5 seconds. Do 3 sets of 10.            Published by [employer].  This content is reviewed periodically and is subject to change as new health information becomes available. The information is intended to inform and educate and is not a replacement for medical evaluation, advice, diagnosis or treatment by a healthcare professional.   Written by Rebecca Martinez, MS, PT, and Brittni Lujan PT, [facility], [facility], for [employer].   ? 2010 [facility] and/or its affiliates. All Rights Reserved.   Copyright   [employer] 2011

## 2018-08-03 NOTE — MR AVS SNAPSHOT
After Visit Summary   8/3/2018    See Roshan    MRN: [id number]           Patient Information     Date Of Birth          1980        Visit Information        Provider Department      8/3/2018 9:30 AM Marcia Clemens MD [facility]        Today's Diagnoses     Bilateral carpal tunnel syndrome    -  1    Essential hypertension          Care Instructions    Futuro energizing wrist splint                      Carpal Tunnel Syndrome               What is carpal tunnel syndrome?   Carpal tunnel syndrome is a common, painful disorder of the wrist and hand.   How does it occur?   Carpal tunnel syndrome is caused by pressure on the median nerve in your wrist. People who use their hands and wrists in the same motion over and over tend to develop carpal tunnel syndrome. It is common in cashiers, , assembly-line workers, and people who work on the computer.   Swelling from a broken bone or other injury can cause pressure on the nerve as well. Diseases such as arthritis, diabetes, or hypothyroidism can cause swelling and pressure in the wrist. Sometimes it happens during pregnancy.   What are the symptoms?   The symptoms include:   pain, numbness, or tingling in your hand and wrist, especially in the thumb and index and middle fingers; pain may radiate up into the forearm   increased pain with increased use of your hand, such as when you are driving or reading the newspaper   increased pain at night   weak  and tendency to drop objects held in the hand   sensitivity to cold   muscle deterioration especially in the thumb (in later stages)   How is it diagnosed?   Your healthcare provider will review your symptoms, examine you, and discuss the ways you use your hands. He or she may also do the following tests:   Your provider may tap the inside middle of your wrist over the median nerve. You may feel pain or a sensation like an electric shock.   You may be asked to bend your wrist down  for one minute to see if this causes symptoms.   Your provider may arrange to test the response of your nerves and muscles to electrical stimulation.   How is it treated?   If you have a disease that is causing carpal tunnel syndrome (such as rheumatoid arthritis), treating the disease may relieve your symptoms.   Other treatment focuses on relieving irritation and pressure on the nerve in your wrist. To relieve pressure your healthcare provider may suggest:   restricting use of your hand or changing the way you use it   changing the position of your desk, computer, and chair to one that irritates your wrist less   wearing a wrist splint   exercises   Your provider may prescribe a steroid medicine or a nonsteroidal anti-inflammatory medicine, such as ibuprofen. Nonsteroidal anti-inflammatory medicines (NSAIDs) may cause stomach bleeding and other problems. These risks increase with age. Read the label and take as directed. Unless recommended by your healthcare provider, do not take for more than 10 days.   Your provider may give you an injection of a corticosteroid medicine.   In some cases surgery may be necessary.   How long will the effects last?   How long the symptoms of carpal tunnel syndrome last depends on the cause and your response to treatment. Sometimes the symptoms go away without any treatment, or they may be relieved by nonsurgical treatment. Surgery may be needed to relieve the symptoms if they do not respond to treatment or they get worse. Surgery usually relieves the symptoms, especially if there is no permanent damage to the nerve.   Symptoms of carpal tunnel syndrome that occur during pregnancy usually disappear following delivery.   How can I take care of myself?   Follow your healthcare provider's recommendations. Also try the following:   Raise your arm on a pillow when you sit or lie down.   Avoid activities that overuse your hand.   When you use a computer mouse, use it with the hand that  does not have carpal tunnel syndrome.   Find a different way to use your hand by using another tool or try to use the other hand.   Avoid bending your wrists.   When can I return to my normal activities?   Everyone recovers from an injury at a different rate. Return to your activities depends on how soon your wrist recovers, not by how many days or weeks it has been since your injury has occurred. In general, the longer you have symptoms before you start treatment, the longer it will take to get better. The goal is to return to your normal activities as soon as is safely possible. If you return too soon you may worsen your injury.   You may return to your activities when you are able to painlessly  objects and have full range of motion and strength back in your wrist.   What can I do to help prevent carpal tunnel syndrome?   Make sure that your hands and wrists are supported, especially if you do repetitive work. Take regular breaks from the repetitive motion. Do not rest your wrists on hard or ridged surfaces for long periods of time.   In some cases the cause is not known and carpal tunnel syndrome cannot be prevented.            Carpal Tunnel Rehabilitation Exercise            You may do all of these exercises right away.   Wrist Range of Motion   0. Flexion: Gently bend your wrist forward. Hold for 5 seconds. Do 3 sets of 10.   0. Extension: Gently bend your wrist backward. Hold this position 5 seconds. Do 3 sets of 10.   0. Side to side: Gently move your wrist from side to side (a handshake motion). Hold for 5 seconds in each direction. Do 3 sets of 10.   Wrist stretch: Press the back of the hand on your injured side with your other hand to help bend your wrist. Hold for 15 to 30 seconds. Next, stretch the hand back by pressing the fingers in a backward direction. Hold for 15 to 30 seconds. Keep the arm on your injured side straight during this exercise. Do 3 sets.   Mid-trap exercise: Lie on your stomach  on a firm surface and place a folded pillow underneath your chest. Place your arms out straight to your sides with your elbows straight and thumbs toward the ceiling. Slowly raise your arms toward the ceiling as you squeeze your shoulder blades together. Lower slowly. Do 3 sets of 15. As the exercise gets easier to do, hold soup cans or small weights in your hands.   Pectoralis stretch:  a doorway or corner with both hands slightly above your head on the door frame or wall. Slowly lean forward until you feel a stretch in the front of your shoulders. Hold 15 to 30 seconds. Repeat 3 times.   Scalene stretch: Sit or stand and clasp both hands behind your back. Lower your left shoulder and tilt your head toward the right until you feel a stretch. Hold this position for 15 to 30 seconds and then come back to the starting position. Then lower your right shoulder and tilt your head toward the left. Hold for 15 to 30 seconds. Repeat 3 times on each side.   Thoracic extension: While sitting in a chair, clasp both arms behind your head. Gently arch backward and look up toward the ceiling. Repeat 10 times. Do this several times each day.   Scapular squeeze: While sitting or standing with your arms by your sides, squeeze your shoulder blades together and hold for 5 seconds. Do 3 sets of 10.   Wrist extension: Hold a soup can or hammer handle in your hand with your palm facing down. Slowly bend your wrist up. Slowly lower the weight down into the starting position. Do 3 sets of 10. Gradually increase the weight of the object you are holding.    strengthening: Squeeze a soft rubber ball and hold the squeeze for 5 seconds. Do 3 sets of 10.            Published by [employer].  This content is reviewed periodically and is subject to change as new health information becomes available. The information is intended to inform and educate and is not a replacement for medical evaluation, advice, diagnosis or treatment by a  "healthcare professional.   Written by Rebecca Martinez, MS, PT, and Brittni Lujan, PT, [facility], [facility], for [employer].   ? 2010 [facility] and/or its affiliates. All Rights Reserved.   Copyright   Clinical Reference Systems 2011                    Follow-ups after your visit        Who to contact     Normal or non-critical lab and imaging results will be communicated to you by [employer], letter or phone within 4 business days after the clinic has received the results. If you do not hear from us within 7 days, please contact the clinic through [employer] or phone. If you have a critical or abnormal lab result, we will notify you by phone as soon as possible.  Submit refill requests through [employer] or call your pharmacy and they will forward the refill request to us. Please allow 3 business days for your refill to be completed.          If you need to speak with a  for additional information , please call: [phone]             Additional Information About Your Visit        [employer] Information     [employer] gives you secure access to your electronic health record. If you see a primary care provider, you can also send messages to your care team and make appointments. If you have questions, please call your primary care clinic.  If you do not have a primary care provider, please call [phone] and they will assist you.        Care EveryWhere ID     This is your Care EveryWhere ID. This could be used by other organizations to access your [facility] medical records  [id number]        Your Vitals Were     Pulse Temperature Height BMI (Body Mass Index)          88 98.6  F (37  C) (Tympanic) 4' 11\" (1.499 m) 41.81 kg/m2         Blood Pressure from Last 3 Encounters:   08/03/18 122/88   06/15/18 136/84   10/16/17 122/76    Weight from Last 3 Encounters:   08/03/18 207 lb (93.9 kg)   10/16/17 194 lb 3.2 oz (88.1 kg)   07/05/17 193 lb (87.5 kg)              Today, you had the following     No orders found for display "         Today's Medication Changes          These changes are accurate as of 8/3/18 10:09 AM.  If you have any questions, ask your nurse or doctor.               Stop taking these medicines if you haven't already. Please contact your care team if you have questions.     order for DME   Stopped by:  Marcia Clemens MD                Where to get your medicines      These medications were sent to [facility], [place] - [address]  [address] [facility] [address]     Phone:  [phone]     losartan 50 MG tablet                Primary Care Provider Office Phone # Fax #    Marcia Clemens [phone] [address] [facility] [address]        Equal Access to Services     [facility]: Hadii aad ku hadasho Soomaali, waaxda luqadaha, qaybta kaalmada adeegyada, waxay emmain haysamuel fry . So [facility] [phone].    ATENCIÓN: Si habla español, tiene a woods disposición servicios gratuitos de asistencia lingüística. [facility] [phone].    We comply with applicable federal civil rights laws and [place] laws. We do not discriminate on the basis of race, color, national origin, age, disability, sex, sexual orientation, or gender identity.            Thank you!     Thank you for choosing [facility]  for your care. Our goal is always to provide you with excellent care. Hearing back from our patients is one way we can continue to improve our services. Please take a few minutes to complete the written survey that you may receive in the mail after your visit with us. Thank you!             Your Updated Medication List - Protect others around you: Learn how to safely use, store and throw away your medicines at www.disposemymeds.org.          This list is accurate as of 8/3/18 10:09 AM.  Always use your most recent med list.                   Brand Name Dispense Instructions for use Diagnosis    albuterol (2.5 MG/3ML) 0.083% neb solution     180  vial    Take 1 vial (2.5 mg) by nebulization every 6 hours as needed for shortness of breath / dyspnea or wheezing    Wheezing, SOB (shortness of breath)       fluticasone 50 MCG/ACT spray    FLONASE    3 Bottle    Spray 1-2 sprays into both nostrils daily    Seasonal allergic rhinitis       levocetirizine 5 MG tablet    XYZAL    90 tablet    Take 1 tablet (5 mg) by mouth every evening    Chronic rhinitis       losartan 50 MG tablet    COZAAR    90 tablet    Take 1 tablet (50 mg) by mouth daily    Essential hypertension

## 2018-08-03 NOTE — PROGRESS NOTES
SUBJECTIVE:                                                    Arturo Islas is a 37 year old female who presents to clinic today for the following health issues:    Chief Complaint   Patient presents with     Numbness     numbness in both hands for sometime, recently waking her up at night and into the arms.        Hypertension Follow-up    Outpatient blood pressures are being checked at work.  Results are good range.    Low Salt Diet: low salt      Problem list and histories reviewed & adjusted, as indicated.  Additional history:starte d a week or so hands starting in the hand and now going up in he hands right hand dom inant and waking up at night with the pain and then shakes it   No wrsit splints   Wears gloves when biking has been biking for an hour really numb after than it is the whole hand   S: See Roshan is a 37 year old female who complains of numbness of lateral aspect of left hand, especially with use of the hand and at night.     O: She appears well, vitals are normal. Hand exam revealed bilaterally normal motor power and no atrophy.    RIGHT hand: Phalen's sign positive - Tinel's sign is negative.  LEFT hand: Phalen is positive - Tinel is negative.    A: Carpal tunnel syndrome.      P: Explanation of median nerve entrapment is given. A wrist splint is recommended for prn use, especially at night.  The treatment spectrum is discussed, including possible surgical release if the symptoms are persistent and severe.  Return as needed for this problem.    Patient Active Problem List   Diagnosis     CARDIOVASCULAR SCREENING; LDL GOAL LESS THAN 130     Hypertension     Infertility associated with anovulation     Obesity (BMI 30-39.9)     Menorrhagia     Health Care Home     Past Surgical History:   Procedure Laterality Date     ESOPHAGOSCOPY, GASTROSCOPY, DUODENOSCOPY (EGD), COMBINED N/A 7/16/2015    Procedure: COMBINED ESOPHAGOSCOPY, GASTROSCOPY, DUODENOSCOPY (EGD), BIOPSY SINGLE OR MULTIPLE;  Surgeon: Keagan Lozano,  "MD;  Location: [facility]     INJECT EPIDURAL CAUDAL  6/25/2012    Procedure: INJECT EPIDURAL CAUDAL;  ANUEL Caudal--;  Surgeon: Provider, Generic Anesthesia;  Location: [facility]     [place] teeth pulled one  2008       Social History   Substance Use Topics     Smoking status: Former Smoker     Packs/day: 0.50     Years: 10.00     Types: Cigarettes     Quit date: 1/1/2005     Smokeless tobacco: Never Used     Alcohol use No     Family History   Problem Relation Age of Onset     Diabetes Mother      Hypertension Mother      Arthritis Mother      Cardiovascular Father      Hypertension Father      Hypertension Brother      Hypertension Brother      Hypertension Brother      Hypertension Brother      Hypertension Sister      Hypertension Sister      Hypertension Brother      Hypertension Brother            ROS:  Constitutional, HEENT, cardiovascular, pulmonary, gi and gu systems are negative, except as otherwise noted.    OBJECTIVE:                                                    /88  Pulse 88  Temp 98.6  F (37  C) (Tympanic)  Ht 4' 11\" (1.499 m)  Wt 207 lb (93.9 kg)  BMI 41.81 kg/m2 Body mass index is 41.81 kg/(m^2).   GENERAL: healthy, alert, well nourished, well hydrated, no distress  HENT: ear canals- normal; TMs- normal; Nose- normal; Mouth- no ulcers, no lesions  NECK: no tenderness, no adenopathy, no asymmetry, no masses, no stiffness; thyroid- normal to palpation  RESP: lungs clear to auscultation - no rales, no rhonchi, no wheezes  CV: regular rates and rhythm, normal S1 S2, no S3 or S4 and no murmur, no click or rub -       ASSESSMENT/PLAN:                                                      1. Essential hypertension  Good control   - losartan (COZAAR) 50 MG tablet; Take 1 tablet (50 mg) by mouth daily  Dispense: 90 tablet; Refill: 3    2. Bilateral carpal tunnel syndrome  See patient information  Wear the splints if this is getting worse will refer to hadn surgery    reports that she quit " smoking about 13 years ago. Her smoking use included Cigarettes. She has a 5.00 pack-year smoking history. She has never used smokeless tobacco.          Marcia Clemens M.D.  [facility]

## 2018-08-04 ASSESSMENT — ANXIETY QUESTIONNAIRES: GAD7 TOTAL SCORE: 6

## 2018-08-04 ASSESSMENT — PATIENT HEALTH QUESTIONNAIRE - PHQ9: SUM OF ALL RESPONSES TO PHQ QUESTIONS 1-9: 5

## 2019-01-18 ENCOUNTER — OFFICE VISIT (OUTPATIENT)
Dept: OBGYN | Facility: CLINIC | Age: 39
End: 2019-01-18
Payer: COMMERCIAL

## 2019-01-18 VITALS
SYSTOLIC BLOOD PRESSURE: 135 MMHG | DIASTOLIC BLOOD PRESSURE: 75 MMHG | TEMPERATURE: 97.5 F | BODY MASS INDEX: 42.98 KG/M2 | HEIGHT: 59 IN | WEIGHT: 213.2 LBS | HEART RATE: 99 BPM | RESPIRATION RATE: 16 BRPM

## 2019-01-18 DIAGNOSIS — Z00.00 ANNUAL PHYSICAL EXAM: Primary | ICD-10-CM

## 2019-01-18 DIAGNOSIS — N91.4 SECONDARY OLIGOMENORRHEA: ICD-10-CM

## 2019-01-18 PROCEDURE — G0145 SCR C/V CYTO,THINLAYER,RESCR: HCPCS | Performed by: OBSTETRICS & GYNECOLOGY

## 2019-01-18 PROCEDURE — 87624 HPV HI-RISK TYP POOLED RSLT: CPT | Performed by: OBSTETRICS & GYNECOLOGY

## 2019-01-18 PROCEDURE — 99395 PREV VISIT EST AGE 18-39: CPT | Performed by: OBSTETRICS & GYNECOLOGY

## 2019-01-18 ASSESSMENT — MIFFLIN-ST. JEOR: SCORE: 1557.7

## 2019-01-18 ASSESSMENT — ANXIETY QUESTIONNAIRES
3. WORRYING TOO MUCH ABOUT DIFFERENT THINGS: SEVERAL DAYS
6. BECOMING EASILY ANNOYED OR IRRITABLE: NOT AT ALL
2. NOT BEING ABLE TO STOP OR CONTROL WORRYING: SEVERAL DAYS
GAD7 TOTAL SCORE: 3
IF YOU CHECKED OFF ANY PROBLEMS ON THIS QUESTIONNAIRE, HOW DIFFICULT HAVE THESE PROBLEMS MADE IT FOR YOU TO DO YOUR WORK, TAKE CARE OF THINGS AT HOME, OR GET ALONG WITH OTHER PEOPLE: SOMEWHAT DIFFICULT
7. FEELING AFRAID AS IF SOMETHING AWFUL MIGHT HAPPEN: NOT AT ALL
1. FEELING NERVOUS, ANXIOUS, OR ON EDGE: SEVERAL DAYS
5. BEING SO RESTLESS THAT IT IS HARD TO SIT STILL: NOT AT ALL

## 2019-01-18 ASSESSMENT — PATIENT HEALTH QUESTIONNAIRE - PHQ9
SUM OF ALL RESPONSES TO PHQ QUESTIONS 1-9: 5
5. POOR APPETITE OR OVEREATING: NOT AT ALL

## 2019-01-18 NOTE — NURSING NOTE
"Initial /75 (BP Location: Left arm, Patient Position: Sitting, Cuff Size: Adult Large)   Pulse 99   Temp 97.5  F (36.4  C) (Tympanic)   Resp 16   Ht 1.499 m (4' 11\")   Wt 96.7 kg (213 lb 3.2 oz)   LMP 12/24/2018   Breastfeeding? No   BMI 43.06 kg/m   Estimated body mass index is 43.06 kg/m  as calculated from the following:    Height as of this encounter: 1.499 m (4' 11\").    Weight as of this encounter: 96.7 kg (213 lb 3.2 oz). .    Noni Stock, [facility]    "

## 2019-01-18 NOTE — PROGRESS NOTES
SUBJECTIVE:   CC: See Roshan is an 38 year old woman who presents for preventive health visit.     Tried for years to get pregnant; never worked, her periods were always very irregular.    4 years ago, and for three years after her periods were regular.  Now for the last year they have been irregular again, sometimes spotting, sometimes clotting.  She hasn't given up hope for a pregnancy but doesn't currently have a partner, but is going to give it until age 41 before she would stop letting a pregnancy happen.    Has h/o BENJAMIN, has a CPAP machine, doesn't use it much because it makes her feel like she is suffocating.       Healthy Habits:    Do you get at least three servings of calcium containing foods daily (dairy, green leafy vegetables, etc.)? yes    Amount of exercise or daily activities, outside of work: None    Problems taking medications regularly No    Medication side effects: No    Have you had an eye exam in the past two years? no    Do you see a dentist twice per year? no    Do you have sleep apnea, excessive snoring or daytime drowsiness?yes      Regulate cycle    Today's PHQ-2 Score:   PHQ-2 (  [employer]) 2019 8/3/2018   Q1: Little interest or pleasure in doing things 0 0   Q2: Feeling down, depressed or hopeless 1 1   PHQ-2 Score 1 1       Abuse: Current or Past(Physical, Sexual or Emotional)- No  Do you feel safe in your environment? Yes    Social History     Tobacco Use     Smoking status: Former Smoker     Packs/day: 0.50     Years: 10.00     Pack years: 5.00     Types: Cigarettes     Last attempt to quit: 2005     Years since quittin.0     Smokeless tobacco: Never Used   Substance Use Topics     Alcohol use: No     If you drink alcohol do you typically have >3 drinks per day or >7 drinks per week? No                     Reviewed orders with patient.  Reviewed health maintenance and updated orders accordingly - Yes  BP Readings from Last 3 Encounters:   19 135/75  "  08/03/18 122/88   06/15/18 136/84    Wt Readings from Last 3 Encounters:   01/18/19 96.7 kg (213 lb 3.2 oz)   08/03/18 93.9 kg (207 lb)   10/16/17 88.1 kg (194 lb 3.2 oz)                    Mammogram not appropriate for this patient based on age.    Pertinent mammograms are reviewed under the imaging tab.  History of abnormal Pap smear:   NO - age 30-65 PAP every 5 years with negative HPV co-testing recommended  Last 3 Pap and HPV Results:   PAP / HPV 4/20/2011   PAP NIL     PAP / HPV 4/20/2011   PAP NIL     Reviewed and updated as needed this visit by clinical staff  Tobacco  Allergies  Meds  Med Hx  Surg Hx  Fam Hx  Soc Hx        Reviewed and updated as needed this visit by Provider            ROS:  CONSTITUTIONAL: NEGATIVE for fever, chills, change in weight  INTEGUMENTARU/SKIN: NEGATIVE for worrisome rashes, moles or lesions  EYES: NEGATIVE for vision changes or irritation  ENT: NEGATIVE for ear, mouth and throat problems  RESP: NEGATIVE for significant cough or SOB  BREAST: NEGATIVE for masses, tenderness or discharge  CV: NEGATIVE for chest pain, palpitations or peripheral edema  GI: NEGATIVE for nausea, abdominal pain, heartburn, or change in bowel habits   female: irregular periods  MUSCULOSKELETAL: NEGATIVE for significant arthralgias or myalgia  NEURO: NEGATIVE for weakness, dizziness or paresthesias  PSYCHIATRIC: NEGATIVE for changes in mood or affect    OBJECTIVE:   /75 (BP Location: Left arm, Patient Position: Sitting, Cuff Size: Adult Large)   Pulse 99   Temp 97.5  F (36.4  C) (Tympanic)   Resp 16   Ht 1.499 m (4' 11\")   Wt 96.7 kg (213 lb 3.2 oz)   LMP 12/24/2018   Breastfeeding? No   BMI 43.06 kg/m    EXAM:  GENERAL: healthy, alert and no distress  EYES: Eyes grossly normal to inspection, PERRL and conjunctivae and sclerae normal  HENT: normal cephalic/atraumatic and oral mucous membranes moist  NECK: no adenopathy, no asymmetry, masses, or scars and thyroid normal to " palpation  RESP: lungs clear to auscultation - no rales, rhonchi or wheezes  BREAST: normal without masses, tenderness or nipple discharge and no palpable axillary masses or adenopathy  CV: regular rate and rhythm, normal S1 S2, no S3 or S4, no murmur, click or rub, no peripheral edema and peripheral pulses strong  ABDOMEN: soft, nontender, without hepatosplenomegaly or masses and obese   (female): normal female external genitalia, normal urethral meatus, vaginal mucosa pink, moist, well rugated, and normal cervix/adnexa/uterus without masses or discharge  MS: no gross musculoskeletal defects noted, no edema  SKIN: no suspicious lesions or rashes  NEURO: Normal strength and tone, mentation intact and speech normal  PSYCH: mentation appears normal, affect normal/bright    Diagnostic Test Results:  none     ASSESSMENT/PLAN:   1. Annual physical exam  Declines STI screening.  Pap smear done today.  Mammogram not indicated today due to age.  Colonoscopy not indicated today due to age.  Labs not due today.  Immunizations up to date.  Discussed weight control, activity, and aging gracefully.  We discussed weight loss as a way to help regulate cycles, reduce symptoms of sleep apnea, and potentially help with HTN.  - Pap imaged thin layer screen with HPV - recommended age 30 - 65 years (select HPV order below)  - HPV High Risk Types DNA Cervical    2. Secondary oligomenorrhea  We discussed that she should never go longer than 6 months without a period, and I would give a 10 day course of provera to induce a period and reduce the risk of hyperplasia if this were to be the case (she says she hasn't even gone longer than 3 months).  We also discussed that using estrogen to regulate her cycles is relatively contraindicated due to her HTN and it's not really helpful in terms of getting pregnant if that is her eventual goal.      3. Preconception counseling  Discussed PNV if/when she has a partner.  S/p flu shot this year, up  "to date on other immunizations.  Discussed optimization of weight, blood pressure, and stress reduction (see #1).  Discussed calling clinic with a positive pregnancy test and presenting for care at 8 weeks of pregnancy for ultrasound and labs.      COUNSELING:   Reviewed preventive health counseling, as reflected in patient instructions       Regular exercise       Healthy diet/nutrition    BP Readings from Last 1 Encounters:   01/18/19 135/75     Estimated body mass index is 43.06 kg/m  as calculated from the following:    Height as of this encounter: 1.499 m (4' 11\").    Weight as of this encounter: 96.7 kg (213 lb 3.2 oz).      Weight management plan: Discussed healthy diet and exercise guidelines     reports that she quit smoking about 14 years ago. Her smoking use included cigarettes. She has a 5.00 pack-year smoking history. she has never used smokeless tobacco.      Counseling Resources:  ATP IV Guidelines  Pooled Cohorts Equation Calculator  Breast Cancer Risk Calculator  FRAX Risk Assessment  ICSI Preventive Guidelines  Dietary Guidelines for Americans, 2010  USDA's MyPlate  ASA Prophylaxis  Lung CA Screening    Arcelia Avila MD  [facility]  "

## 2019-01-19 ASSESSMENT — ANXIETY QUESTIONNAIRES: GAD7 TOTAL SCORE: 3

## 2019-01-22 LAB
COPATH REPORT: NORMAL
PAP: NORMAL

## 2019-01-23 LAB
FINAL DIAGNOSIS: NORMAL
HPV HR 12 DNA CVX QL NAA+PROBE: NEGATIVE
HPV16 DNA SPEC QL NAA+PROBE: NEGATIVE
HPV18 DNA SPEC QL NAA+PROBE: NEGATIVE
SPECIMEN DESCRIPTION: NORMAL
SPECIMEN SOURCE CVX/VAG CYTO: NORMAL

## 2019-04-19 ENCOUNTER — OFFICE VISIT (OUTPATIENT)
Dept: FAMILY MEDICINE | Facility: CLINIC | Age: 39
End: 2019-04-19
Payer: COMMERCIAL

## 2019-04-19 VITALS
HEIGHT: 59 IN | BODY MASS INDEX: 41.19 KG/M2 | WEIGHT: 204.3 LBS | SYSTOLIC BLOOD PRESSURE: 139 MMHG | HEART RATE: 79 BPM | DIASTOLIC BLOOD PRESSURE: 89 MMHG | TEMPERATURE: 98.9 F

## 2019-04-19 DIAGNOSIS — R10.9 FLANK PAIN: Primary | ICD-10-CM

## 2019-04-19 LAB
ALBUMIN UR-MCNC: NEGATIVE MG/DL
APPEARANCE UR: CLEAR
BILIRUB UR QL STRIP: NEGATIVE
COLOR UR AUTO: YELLOW
GLUCOSE UR STRIP-MCNC: NEGATIVE MG/DL
HGB UR QL STRIP: ABNORMAL
KETONES UR STRIP-MCNC: NEGATIVE MG/DL
LEUKOCYTE ESTERASE UR QL STRIP: ABNORMAL
NITRATE UR QL: NEGATIVE
PH UR STRIP: 6 PH (ref 5–7)
RBC #/AREA URNS AUTO: NORMAL /HPF
SOURCE: ABNORMAL
SP GR UR STRIP: 1.02 (ref 1–1.03)
UROBILINOGEN UR STRIP-ACNC: 0.2 EU/DL (ref 0.2–1)
WBC #/AREA URNS AUTO: NORMAL /HPF

## 2019-04-19 PROCEDURE — 99213 OFFICE O/P EST LOW 20 MIN: CPT | Performed by: FAMILY MEDICINE

## 2019-04-19 PROCEDURE — 81001 URINALYSIS AUTO W/SCOPE: CPT | Performed by: FAMILY MEDICINE

## 2019-04-19 ASSESSMENT — MIFFLIN-ST. JEOR: SCORE: 1517.33

## 2019-04-19 NOTE — PROGRESS NOTES
"    SUBJECTIVE:   See Roshan is a 38 year old female who presents to clinic today for the following   health issues:    Better today.    ABDOMINAL   PAIN/flank pain      Onset: Since Saturday     Description:   Character: Sharp and with \"shooting pains \" here and there   Location: left upper quadrant under left rib area   Radiation: Back    Intensity: mild    Progression of Symptoms: slight improving and intermittent    Accompanying Signs & Symptoms:  Fever/Chills?: no   Gas/Bloating: no   Nausea: no   Vomitting: no   Diarrhea?: no   Constipation:no   Dysuria or Hematuria: no    History:   Trauma: no   Previous similar pain: no    Previous tests done: none    Precipitating factors:   Does the pain change with:     Food: no      BM: no     Urination: no     Alleviating factors:      Therapies Tried and outcome: Ibuprofen     LMP:  04/09/2019         Additional history: as documented    Reviewed  and updated as needed this visit by clinical staff  Tobacco  Allergies  Meds  Med Hx  Surg Hx  Fam Hx  Soc Hx        Reviewed and updated as needed this visit by Provider         Patient Active Problem List   Diagnosis     CARDIOVASCULAR SCREENING; LDL GOAL LESS THAN 130     Hypertension     Infertility associated with anovulation     Obesity (BMI 30-39.9)     Menorrhagia     Health Care Home     Past Surgical History:   Procedure Laterality Date     ESOPHAGOSCOPY, GASTROSCOPY, DUODENOSCOPY (EGD), COMBINED N/A 7/16/2015    Procedure: COMBINED ESOPHAGOSCOPY, GASTROSCOPY, DUODENOSCOPY (EGD), BIOPSY SINGLE OR MULTIPLE;  Surgeon: Keagan Lozano MD;  Location: [facility]     INJECT EPIDURAL CAUDAL  6/25/2012    Procedure: INJECT EPIDURAL CAUDAL;  ANUEL Caudal--;  Surgeon: Provider, Generic Anesthesia;  Location: [facility]     [place] teeth pulled one  2008       Social History     Tobacco Use     Smoking status: Former Smoker     Packs/day: 0.50     Years: 10.00     Pack years: 5.00     Types: Cigarettes     Last attempt to quit: " "2005     Years since quittin.3     Smokeless tobacco: Never Used   Substance Use Topics     Alcohol use: No     Family History   Problem Relation Age of Onset     Diabetes Mother      Hypertension Mother      Arthritis Mother      Cardiovascular Father      Hypertension Father      Hypertension Brother      Hypertension Brother      Hypertension Brother      Hypertension Brother      Hypertension Sister      Hypertension Sister      Hypertension Brother      Hypertension Brother          Current Outpatient Medications   Medication Sig Dispense Refill     albuterol (2.5 MG/3ML) 0.083% nebulizer solution Take 1 vial (2.5 mg) by nebulization every 6 hours as needed for shortness of breath / dyspnea or wheezing 180 vial 1     fluticasone (FLONASE) 50 MCG/ACT spray Spray 1-2 sprays into both nostrils daily 3 Bottle 1     levocetirizine (XYZAL) 5 MG tablet Take 1 tablet (5 mg) by mouth every evening 90 tablet 3     losartan (COZAAR) 50 MG tablet Take 1 tablet (50 mg) by mouth daily 90 tablet 3     Allergies   Allergen Reactions     Amoxicillin Hives       ROS:  Constitutional, HEENT, cardiovascular, pulmonary, gi and gu systems are negative, except as otherwise noted.    OBJECTIVE:     /89 (BP Location: Right arm, Patient Position: Sitting, Cuff Size: Adult Large)   Pulse 79   Temp 98.9  F (37.2  C) (Tympanic)   Ht 1.499 m (4' 11\")   Wt 92.7 kg (204 lb 4.8 oz)   BMI 41.26 kg/m    Body mass index is 41.26 kg/m .  GENERAL: healthy, alert and no distress  NECK: no adenopathy, no asymmetry, masses, or scars and thyroid normal to palpation  RESP: lungs clear to auscultation - no rales, rhonchi or wheezes  CV: regular rate and rhythm, normal S1 S2, no S3 or S4, no murmur, click or rub, no peripheral edema and peripheral pulses strong  ABDOMEN: soft, nontender, no hepatosplenomegaly, no masses and bowel sounds normal  MS: no gross musculoskeletal defects noted, no edema    Diagnostic Test Results:  none "     ASSESSMENT/PLAN:     1. Flank pain  Unclear etiology.  incfease fiber in diet, p fruits   If not improving have abd xray.  - UA reflex to Microscopic and Culture  - Urine Microscopic    Mil Mathew MD  [facility]

## 2019-04-24 DIAGNOSIS — T75.3XXD MOTION SICKNESS, SUBSEQUENT ENCOUNTER: Primary | ICD-10-CM

## 2019-04-24 RX ORDER — SCOLOPAMINE TRANSDERMAL SYSTEM 1 MG/1
1 PATCH, EXTENDED RELEASE TRANSDERMAL
COMMUNITY
End: 2019-04-24

## 2019-04-24 RX ORDER — SCOLOPAMINE TRANSDERMAL SYSTEM 1 MG/1
1 PATCH, EXTENDED RELEASE TRANSDERMAL
Qty: 3 PATCH | Refills: 1 | Status: SHIPPED | OUTPATIENT
Start: 2019-04-24 | End: 2021-10-13

## 2019-04-24 NOTE — TELEPHONE ENCOUNTER
Patient requesting refill of transderm - has previously been prescribed medication from travel clinic.    Thanks!    Sary Do, PharmD  [facility] [place]  [phone]

## 2019-04-25 ENCOUNTER — OFFICE VISIT (OUTPATIENT)
Dept: FAMILY MEDICINE | Facility: CLINIC | Age: 39
End: 2019-04-25
Payer: COMMERCIAL

## 2019-04-25 VITALS
BODY MASS INDEX: 41.12 KG/M2 | RESPIRATION RATE: 18 BRPM | OXYGEN SATURATION: 93 % | TEMPERATURE: 103.8 F | SYSTOLIC BLOOD PRESSURE: 130 MMHG | HEART RATE: 135 BPM | HEIGHT: 59 IN | WEIGHT: 204 LBS | DIASTOLIC BLOOD PRESSURE: 88 MMHG

## 2019-04-25 DIAGNOSIS — J18.9 PNEUMONIA OF LEFT LOWER LOBE DUE TO INFECTIOUS ORGANISM: ICD-10-CM

## 2019-04-25 DIAGNOSIS — R50.9 FEVER, UNSPECIFIED FEVER CAUSE: Primary | ICD-10-CM

## 2019-04-25 LAB
FLUAV+FLUBV AG SPEC QL: NEGATIVE
FLUAV+FLUBV AG SPEC QL: NEGATIVE
SPECIMEN SOURCE: NORMAL

## 2019-04-25 PROCEDURE — 99213 OFFICE O/P EST LOW 20 MIN: CPT | Performed by: PHYSICIAN ASSISTANT

## 2019-04-25 PROCEDURE — 87804 INFLUENZA ASSAY W/OPTIC: CPT | Performed by: PHYSICIAN ASSISTANT

## 2019-04-25 RX ORDER — AZITHROMYCIN 250 MG/1
TABLET, FILM COATED ORAL
Qty: 6 TABLET | Refills: 0 | Status: SHIPPED | OUTPATIENT
Start: 2019-04-25 | End: 2019-04-30

## 2019-04-25 ASSESSMENT — MIFFLIN-ST. JEOR: SCORE: 1515.97

## 2019-04-25 ASSESSMENT — PAIN SCALES - GENERAL: PAINLEVEL: NO PAIN (0)

## 2019-04-25 NOTE — PROGRESS NOTES
SUBJECTIVE:   See Roshan is a 38 year old female who presents to clinic today for the following   health issues:      ENT Symptoms             Symptoms: cc Present Absent Comment   Fever/Chills  x  Chills   Fatigue  x     Muscle Aches  x     Eye Irritation   x    Sneezing   x    Nasal Rick/Drg   x    Sinus Pressure/Pain   x    Loss of smell   x    Dental pain   x    Sore Throat  x     Swollen Glands   x    Ear Pain/Fullness   x    Cough  x     Wheeze  x  Last night    Chest Pain   x    Shortness of breath  x  Last night    Rash   x    Other  x  Nausea      Symptom duration:  Sunday 4/21   Symptom severity:  Moderate    Treatments tried:  Ibuprofen, mucinex, albuterol neb   Contacts:  None       Has some sinus pressure/congestion last week  Also developed some flank pain - was seen in clinic on Friday. UA negative for infection  Over the weekend developed a sore throat and cough  Chills overnight - woke up with chills. Did not have a thermometer to take her temp      Additional history: as documented    Reviewed  and updated as needed this visit by clinical staff         Reviewed and updated as needed this visit by Provider         Current Outpatient Medications   Medication Sig Dispense Refill     albuterol (2.5 MG/3ML) 0.083% nebulizer solution Take 1 vial (2.5 mg) by nebulization every 6 hours as needed for shortness of breath / dyspnea or wheezing 180 vial 1     fluticasone (FLONASE) 50 MCG/ACT spray Spray 1-2 sprays into both nostrils daily 3 Bottle 1     levocetirizine (XYZAL) 5 MG tablet Take 1 tablet (5 mg) by mouth every evening 90 tablet 3     losartan (COZAAR) 50 MG tablet Take 1 tablet (50 mg) by mouth daily 90 tablet 3     scopolamine (TRANSDERM) 1 MG/3DAYS 72 hr patch Place 1 patch onto the skin every 72 hours Uses for travel 3 patch 1     Allergies   Allergen Reactions     Amoxicillin Hives     BP Readings from Last 3 Encounters:   04/25/19 130/88   04/19/19 139/89   01/18/19 135/75    Wt Readings from  "Last 3 Encounters:   04/25/19 92.5 kg (204 lb)   04/19/19 92.7 kg (204 lb 4.8 oz)   01/18/19 96.7 kg (213 lb 3.2 oz)                    ROS:  Remainder of ROS obtained and found to be negative other than that which was documented above      OBJECTIVE:     /88   Pulse 135   Temp 103.8  F (39.9  C) (Tympanic)   Resp 18   Ht 1.499 m (4' 11\")   Wt 92.5 kg (204 lb)   SpO2 93%   BMI 41.20 kg/m    Body mass index is 41.2 kg/m .  GENERAL: healthy, alert and no distress  EYES: Eyes grossly normal to inspection  HENT: ear canals and TM's normal, nose and mouth without ulcers or lesions  NECK: no adenopathy  RESP: lungs with good breath sounds throughout, possible faint crackles in LLL   CV: regular rates and rhythm, normal S1 S2, no S3 or S4 and no murmur, click or rub    Diagnostic Test Results:  Influenza: negative    ASSESSMENT/PLAN:     (R50.9) Fever, unspecified fever cause  (primary encounter diagnosis)  Comment:   Plan: Influenza A/B antigen            (J18.1) Pneumonia of left lower lobe due to infectious organism (H)  Comment:   Plan: azithromycin (ZITHROMAX) 250 MG tablet            Follow up on Monday if symptoms are not improving or sooner if any worsening        Nicole Arndt PA-C  [facility]        "

## 2019-04-25 NOTE — PATIENT INSTRUCTIONS
Rest, fluids, ibuprofen/tylenol for fevers, aches/pains    Take the zpak    Follow up if not getting better

## 2019-11-03 DIAGNOSIS — Z13.6 CARDIOVASCULAR SCREENING; LDL GOAL LESS THAN 130: Primary | ICD-10-CM

## 2019-11-03 DIAGNOSIS — I10 ESSENTIAL HYPERTENSION: ICD-10-CM

## 2019-11-04 RX ORDER — LOSARTAN POTASSIUM 50 MG/1
TABLET ORAL
Qty: 90 TABLET | Refills: 0 | Status: SHIPPED | OUTPATIENT
Start: 2019-11-04 | End: 2020-01-31

## 2019-11-04 NOTE — TELEPHONE ENCOUNTER
"LOSARTAN POTASSIUM 50MG TABS      Last Written Prescription Date:  8/3/18  Last Fill Quantity: 90,   # refills: 3  Last Office Visit: 4/25/19  Future Office visit:       Requested Prescriptions   Pending Prescriptions Disp Refills     losartan (COZAAR) 50 MG tablet [Pharmacy Med Name: LOSARTAN POTASSIUM 50MG TABS] 90 tablet 3     Sig: TAKE ONE TABLET BY MOUTH EVERY DAY       Angiotensin-II Receptors Failed - 11/3/2019 11:21 PM        Failed - Normal serum creatinine on file in past 12 months     Recent Labs   Lab Test 04/18/18  0824   CR 0.57             Failed - Normal serum potassium on file in past 12 months     Recent Labs   Lab Test 04/18/18  0824   POTASSIUM 3.7                    Passed - Last blood pressure under 140/90 in past 12 months     BP Readings from Last 3 Encounters:   04/25/19 130/88   04/19/19 139/89   01/18/19 135/75                 Passed - Recent (12 mo) or future (30 days) visit within the authorizing provider's specialty     Patient has had an office visit with the authorizing provider or a provider within the authorizing providers department within the previous 12 mos or has a future within next 30 days. See \"Patient Info\" tab in inbasket, or \"Choose Columns\" in Meds & Orders section of the refill encounter.              Passed - Medication is active on med list        Passed - Patient is age 18 or older        Passed - No active pregnancy on record        Passed - No positive pregnancy test in past 12 months          "

## 2019-11-04 NOTE — TELEPHONE ENCOUNTER
Medication is being filled for 1 time refill only due to:  Patient needs labs lipids,bmp. Future labs ordered yes.   Magno Forte RN

## 2019-11-07 ENCOUNTER — HEALTH MAINTENANCE LETTER (OUTPATIENT)
Age: 39
End: 2019-11-07

## 2019-11-12 DIAGNOSIS — I10 ESSENTIAL HYPERTENSION: ICD-10-CM

## 2019-11-12 DIAGNOSIS — Z13.6 CARDIOVASCULAR SCREENING; LDL GOAL LESS THAN 130: ICD-10-CM

## 2019-11-12 LAB
ANION GAP SERPL CALCULATED.3IONS-SCNC: 2 MMOL/L (ref 3–14)
BUN SERPL-MCNC: 12 MG/DL (ref 7–30)
CALCIUM SERPL-MCNC: 8.9 MG/DL (ref 8.5–10.1)
CHLORIDE SERPL-SCNC: 105 MMOL/L (ref 94–109)
CHOLEST SERPL-MCNC: 184 MG/DL
CO2 SERPL-SCNC: 30 MMOL/L (ref 20–32)
CREAT SERPL-MCNC: 0.6 MG/DL (ref 0.52–1.04)
GFR SERPL CREATININE-BSD FRML MDRD: >90 ML/MIN/{1.73_M2}
GLUCOSE SERPL-MCNC: 94 MG/DL (ref 70–99)
HDLC SERPL-MCNC: 52 MG/DL
LDLC SERPL CALC-MCNC: 106 MG/DL
NONHDLC SERPL-MCNC: 132 MG/DL
POTASSIUM SERPL-SCNC: 3.7 MMOL/L (ref 3.4–5.3)
SODIUM SERPL-SCNC: 137 MMOL/L (ref 133–144)
TRIGL SERPL-MCNC: 130 MG/DL

## 2019-11-12 PROCEDURE — 80061 LIPID PANEL: CPT | Performed by: FAMILY MEDICINE

## 2019-11-12 PROCEDURE — 80048 BASIC METABOLIC PNL TOTAL CA: CPT | Performed by: FAMILY MEDICINE

## 2019-11-12 PROCEDURE — 36415 COLL VENOUS BLD VENIPUNCTURE: CPT | Performed by: FAMILY MEDICINE

## 2020-01-21 ENCOUNTER — OFFICE VISIT (OUTPATIENT)
Dept: FAMILY MEDICINE | Facility: CLINIC | Age: 40
End: 2020-01-21
Payer: COMMERCIAL

## 2020-01-21 VITALS
OXYGEN SATURATION: 96 % | SYSTOLIC BLOOD PRESSURE: 124 MMHG | BODY MASS INDEX: 42.13 KG/M2 | WEIGHT: 209 LBS | TEMPERATURE: 98.7 F | DIASTOLIC BLOOD PRESSURE: 78 MMHG | HEIGHT: 59 IN | HEART RATE: 80 BPM

## 2020-01-21 DIAGNOSIS — G56.03 BILATERAL CARPAL TUNNEL SYNDROME: Primary | ICD-10-CM

## 2020-01-21 DIAGNOSIS — F41.8 SITUATIONAL ANXIETY: ICD-10-CM

## 2020-01-21 DIAGNOSIS — I10 ESSENTIAL HYPERTENSION: ICD-10-CM

## 2020-01-21 PROCEDURE — 99214 OFFICE O/P EST MOD 30 MIN: CPT | Performed by: FAMILY MEDICINE

## 2020-01-21 ASSESSMENT — MIFFLIN-ST. JEOR: SCORE: 1533.65

## 2020-01-21 NOTE — PATIENT INSTRUCTIONS
The orthopedics  should be calling you for the hand appointment   Use the hydroxyzine as needed . .

## 2020-01-21 NOTE — PROGRESS NOTES
"Subjective     See Roshan is a 39 year old female who presents to clinic today for the following health issues:    Chief Complaint   Patient presents with     Cts     Both wrist are painful, increasing, right hand is worse.          HPI     Subjective:   See Roshan is a 39 year old female with hypertension.  Current Outpatient Medications   Medication Sig Dispense Refill     albuterol (2.5 MG/3ML) 0.083% nebulizer solution Take 1 vial (2.5 mg) by nebulization every 6 hours as needed for shortness of breath / dyspnea or wheezing 180 vial 1     fluticasone (FLONASE) 50 MCG/ACT spray Spray 1-2 sprays into both nostrils daily 3 Bottle 1     hydrOXYzine (ATARAX) 10 MG tablet Take 1 tablet (10 mg) by mouth every 8 hours as needed for itching or anxiety 90 tablet 1     hydrOXYzine (ATARAX) 25 MG tablet Take 1-2 tablets (25-50 mg) by mouth every 6 hours as needed for itching or anxiety 60 tablet 11     levocetirizine (XYZAL) 5 MG tablet Take 1 tablet (5 mg) by mouth every evening 90 tablet 3     losartan (COZAAR) 50 MG tablet TAKE ONE TABLET BY MOUTH EVERY DAY 90 tablet 0     scopolamine (TRANSDERM) 1 MG/3DAYS 72 hr patch Place 1 patch onto the skin every 72 hours Uses for travel 3 patch 1      Hypertension ROS: taking medications as instructed, no medication side effects noted, no TIA's, no chest pain on exertion, no dyspnea on exertion, no swelling of ankles.   New concerns: as above   crpal tunnel wearing splints at night   Does help at night  Has numbness right>lt.   She has not been taking the prozac     Objective:   /89   Pulse 80   Temp 98.7  F (37.1  C) (Tympanic)   Ht 1.499 m (4' 11\")   Wt 94.8 kg (209 lb)   SpO2 96%   BMI 42.21 kg/m     Appearance healthy, alert and cooperative.  General exam BP noted to be well controlled today in office, S1, S2 normal, no gallop, no murmur, chest clear, no JVD, no HSM, no edema.   HENT: ear canals and TM's normal and nose and mouth without ulcers or lesions  NECK: no " "adenopathy, no asymmetry, masses, or scars, thyroid normal to palpation and no bruits  RESP: lungs clear to auscultation - no rales, rhonchi or wheezes  CV: regular rates and rhythm, normal S1 S2, no S3 or S4 and no murmur, click or rub  NEURO: Normal strength and tone, mentation intact and speech normal  Bilateral + finklesteins and no weakness normal thenar mass   Finger strength preserved bilaterally     Lab review: labs are reviewed, up to date and normal.     Assessment:    Hypertension well controlled.                       Reviewed and updated as needed this visit by Provider         Review of Systems   ROS COMP: Constitutional, HEENT, cardiovascular, pulmonary, gi and gu systems are negative, except as otherwise noted.      Objective    /78   Pulse 80   Temp 98.7  F (37.1  C) (Tympanic)   Ht 1.499 m (4' 11\")   Wt 94.8 kg (209 lb)   SpO2 96%   BMI 42.21 kg/m    Body mass index is 42.21 kg/m .  Physical Exam   GENERAL: healthy, alert and no distress  See above   Diagnostic Test Results:  Labs reviewed in Epic        Assessment & Plan     1. Bilateral carpal tunnel syndrome  Now intrusive during the day time for more definitive therapu   - ORTHO  REFERRAL    2. Essential hypertension  Well contorlled normal labs   3. Situational anxiety  May cont the hydroxyzine        BMI:   Estimated body mass index is 42.21 kg/m  as calculated from the following:    Height as of this encounter: 1.499 m (4' 11\").    Weight as of this encounter: 94.8 kg (209 lb).   Weight management plan: Discussed healthy diet and exercise guidelines        CONSULTATION/REFERRAL to hand surgery   Work on weight loss  Regular exercise    Return in about 1 year (around 1/21/2021) for med check, Routine Visit.    Marcia Clemens MD  [facility]      "

## 2020-01-30 ENCOUNTER — MYC MEDICAL ADVICE (OUTPATIENT)
Dept: FAMILY MEDICINE | Facility: CLINIC | Age: 40
End: 2020-01-30

## 2020-01-30 DIAGNOSIS — I10 ESSENTIAL HYPERTENSION: ICD-10-CM

## 2020-01-30 DIAGNOSIS — J30.89 NON-SEASONAL ALLERGIC RHINITIS DUE TO OTHER ALLERGIC TRIGGER: Primary | ICD-10-CM

## 2020-01-30 NOTE — TELEPHONE ENCOUNTER
"LOSARTAN POTASSIUM 50MG TABS      Last Written Prescription Date:  11/4/19  Last Fill Quantity: 90,   # refills: 0  Last Office Visit: 1/21/20  Future Office visit:       Requested Prescriptions   Pending Prescriptions Disp Refills     losartan (COZAAR) 50 MG tablet [Pharmacy Med Name: LOSARTAN POTASSIUM 50MG TABS] 90 tablet 0     Sig: TAKE ONE TABLET BY MOUTH ONCE DAILY (NEED FASTING LABS FOR FUTURE REFILLS)       Angiotensin-II Receptors Passed - 1/30/2020  1:49 PM        Passed - Last blood pressure under 140/90 in past 12 months     BP Readings from Last 3 Encounters:   01/21/20 124/78   04/25/19 130/88   04/19/19 139/89                 Passed - Recent (12 mo) or future (30 days) visit within the authorizing provider's specialty     Patient has had an office visit with the authorizing provider or a provider within the authorizing providers department within the previous 12 mos or has a future within next 30 days. See \"Patient Info\" tab in inbasket, or \"Choose Columns\" in Meds & Orders section of the refill encounter.              Passed - Medication is active on med list        Passed - Patient is age 18 or older        Passed - No active pregnancy on record        Passed - Normal serum creatinine on file in past 12 months     Recent Labs   Lab Test 11/12/19  1004   CR 0.60             Passed - Normal serum potassium on file in past 12 months     Recent Labs   Lab Test 11/12/19  1004   POTASSIUM 3.7                    Passed - No positive pregnancy test in past 12 months          "

## 2020-01-31 RX ORDER — LOSARTAN POTASSIUM 50 MG/1
50 TABLET ORAL DAILY
Qty: 90 TABLET | Refills: 3 | Status: SHIPPED | OUTPATIENT
Start: 2020-01-31 | End: 2021-02-03

## 2020-01-31 NOTE — TELEPHONE ENCOUNTER
Prescription approved per [facility] Refill Protocol.  Sintia Alford RN

## 2020-02-03 RX ORDER — MONTELUKAST SODIUM 10 MG/1
10 TABLET ORAL AT BEDTIME
Qty: 30 TABLET | Refills: 4 | Status: SHIPPED | OUTPATIENT
Start: 2020-02-03 | End: 2020-05-05

## 2020-02-04 ENCOUNTER — OFFICE VISIT (OUTPATIENT)
Dept: ORTHOPEDICS | Facility: CLINIC | Age: 40
End: 2020-02-04
Payer: COMMERCIAL

## 2020-02-04 DIAGNOSIS — G56.03 BILATERAL CARPAL TUNNEL SYNDROME: Primary | ICD-10-CM

## 2020-02-04 PROCEDURE — 99203 OFFICE O/P NEW LOW 30 MIN: CPT | Performed by: PLASTIC SURGERY

## 2020-02-04 NOTE — PROGRESS NOTES
CONSULT NOTE      REFERRING PROVIDER:  Marcia Clemens MD      HISTORY OF PRESENTING COMPLAINT:  Consultation for numbness, tingling in both hands.      HISTORY OF PRESENTING COMPLAINT:  Ms. Islas is 39 years old.  For the last 2-3 years, she has been having numbness, tingling in both hands, right worse than left.  She is right hand dominant.  It is accompanied with some hand pain and weakness now.  She has been using splints for some time which have helped but are decreasing in help.  She has never injured her wrist.  She has no neck pain, no radiculopathy, no feet symptoms.  Here to have it looked at.      PAST MEDICAL HISTORY:  Hypertension.      PAST SURGICAL HISTORY:  Tooth surgery.      MEDICATIONS:  Losartan.      ALLERGIES:  Amoxicillin - hives.        SOCIAL HISTORY:  Used to smoke a pack a day for 5 years, quit in 2005.  Does not drink.  Works as a pharm tech.  Lives in [place].      REVIEW OF SYSTEMS:  Denies chest pain, shortness of breath, MI, CVA, DVT and PE.      PHYSICAL EXAMINATION:  Vital signs are stable.  She is afebrile, in no obvious distress.  She is 4 feet 11 inches, 209 pounds, BMI 42 kg/m2.  On examination of her hands, she has no evidence for thenar atrophy or hypothenar atrophy.  On both sides, her median innervated fingers are duller compared to the ulnar innervated fingers.  Thenar eminence is sharp.  She has a negative Phalen, carpal compression test, elbow flexion test.  O sign is normal.  Scratch-collapse test is positive for carpal tunnel syndrome and ulnar nerve compression.      ASSESSMENT AND PLAN:  Based on above findings, a diagnosis of parts of the history as well as the exam consistent with carpal tunnel syndrome and ulnar nerve compression, but at the same time has few other signs that show that.  Therefore, it could be early.  It is a bit hard to assess.  Our plan now is to get nerve conduction test to quantify the problem and then plan accordingly.  I will see her back once  the nerve conduction tests are done.  She understood the plan.  All questions were answered.  She was happy with the visit.      Total time spent with patient 30 minutes, more than half was counseling.      cc:   Marcia Clemens MD   [facility]   [address] [place]  [address]

## 2020-02-04 NOTE — NURSING NOTE
See Roshan's goals for this visit include:   Chief Complaint   Patient presents with     Right Wrist - Pain     Left Wrist - Pain     Consult     bilateral carpal tunnel       She requests these members of her care team be copied on today's visit information: no    PCP: Marcia Clemens    Referring Provider:  Marcia Clemens MD  [id number] [place] [address]    There were no vitals taken for this visit.    Do you need any medication refills at today's visit? No    Shania Hernandez LPN

## 2020-02-04 NOTE — LETTER
2/4/2020         RE: See Roshan  [address] [place] [id number]        Dear Colleague,    Thank you for referring your patient, See Roshan, to the [facility]. Please see a copy of my visit note below.    CONSULT NOTE      REFERRING PROVIDER:  Marcia Clemens MD      HISTORY OF PRESENTING COMPLAINT:  Consultation for numbness, tingling in both hands.      HISTORY OF PRESENTING COMPLAINT:  Ms. Islas is 39 years old.  For the last 2-3 years, she has been having numbness, tingling in both hands, right worse than left.  She is right hand dominant.  It is accompanied with some hand pain and weakness now.  She has been using splints for some time which have helped but are decreasing in help.  She has never injured her wrist.  She has no neck pain, no radiculopathy, no feet symptoms.  Here to have it looked at.      PAST MEDICAL HISTORY:  Hypertension.      PAST SURGICAL HISTORY:  Tooth surgery.      MEDICATIONS:  Losartan.      ALLERGIES:  Amoxicillin - hives.        SOCIAL HISTORY:  Used to smoke a pack a day for 5 years, quit in 2005.  Does not drink.  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      REVIEW OF SYSTEMS:  Denies chest pain, shortness of breath, MI, CVA, DVT and PE.      PHYSICAL EXAMINATION:  Vital signs are stable.  She is afebrile, in no obvious distress.  She is 4 feet 11 inches, 209 pounds, BMI 42 kg/m2.  On examination of her hands, she has no evidence for thenar atrophy or hypothenar atrophy.  On both sides, her median innervated fingers are duller compared to the ulnar innervated fingers.  Thenar eminence is sharp.  She has a negative Phalen, carpal compression test, elbow flexion test.  O sign is normal.  Scratch-collapse test is positive for carpal tunnel syndrome and ulnar nerve compression.      ASSESSMENT AND PLAN:  Based on above findings, a diagnosis of parts of the history as well as the exam consistent with carpal tunnel syndrome and ulnar nerve compression, but at the  same time has few other signs that show that.  Therefore, it could be early.  It is a bit hard to assess.  Our plan now is to get nerve conduction test to quantify the problem and then plan accordingly.  I will see her back once the nerve conduction tests are done.  She understood the plan.  All questions were answered.  She was happy with the visit.      Total time spent with patient 30 minutes, more than half was counseling.      cc:   Marcia Clemens MD   [facility]   [address] [place]  [address]         Again, thank you for allowing me to participate in the care of your patient.        Sincerely,        CORA Burt MD

## 2020-02-04 NOTE — PATIENT INSTRUCTIONS
Thanks for coming today.  [facility]  [address] [place] [address]    To schedule future appointments in Ortho Clinic, you may call [phone].    To schedule ordered imaging by your provider:   Call Central Imaging [phone]    To schedule an injection ordered by your provider:  Call Central [facility] Injection scheduling line: [phone]  [employer] available online at:  [email or web address]    Please call if any further questions or concerns ([phone]).  Clinic hours 8 am to 5 pm.    Return to clinic (call) if symptoms worsen or fail to improve.

## 2020-02-18 NOTE — TELEPHONE ENCOUNTER
RECORDS RECEIVED FROM: Gail carpal tunnel, ref'd by PCP pt seen CORA Burt MD but does not feel comfortable going back to this provider.   DATE RECEIVED: Feb 28, 2020   NOTES STATUS DETAILS   OFFICE NOTE from referring provider Internal  Marcia Clemens MD    OFFICE NOTE from other specialist N/A    DISCHARGE SUMMARY from hospital N/A    DISCHARGE REPORT from the ER N/A    OPERATIVE REPORT N/A    MEDICATION LIST Internal    IMPLANT RECORD/STICKER N/A    LABS     CBC/DIFF N/A    CULTURES N/A    INJECTIONS DONE IN RADIOLOGY N/A    MRI N/A    CT SCAN N/A    XRAYS (IMAGES & REPORTS) N/A    TUMOR     PATHOLOGY  Slides & report N/A      02/18/20   5:00 PM   Pre-visit complete  Digna Glamm, CMA

## 2020-02-25 ASSESSMENT — ENCOUNTER SYMPTOMS
PARALYSIS: 0
SPEECH CHANGE: 0
WEAKNESS: 1
LOSS OF CONSCIOUSNESS: 0
TINGLING: 1
DISTURBANCES IN COORDINATION: 0
DIZZINESS: 0
HEADACHES: 0
MEMORY LOSS: 0
NUMBNESS: 1
TREMORS: 0
SEIZURES: 0

## 2020-02-28 ENCOUNTER — OFFICE VISIT (OUTPATIENT)
Dept: ORTHOPEDICS | Facility: CLINIC | Age: 40
End: 2020-02-28
Payer: COMMERCIAL

## 2020-02-28 ENCOUNTER — PRE VISIT (OUTPATIENT)
Dept: ORTHOPEDICS | Facility: CLINIC | Age: 40
End: 2020-02-28

## 2020-02-28 DIAGNOSIS — G56.03 BILATERAL CARPAL TUNNEL SYNDROME: Primary | ICD-10-CM

## 2020-02-28 DIAGNOSIS — G56.20 CUBITAL TUNNEL SYNDROME, UNSPECIFIED LATERALITY: ICD-10-CM

## 2020-02-28 NOTE — PROGRESS NOTES
Department of Orthopedic Surgery  Clinic Consultation Note      PATIENT NAME: Arturo Islas   MRN: [id number]  AGE: 39 year old  BMI: There is no height or weight on file to calculate BMI.  REFERRING PHYSICIAN: Marcia Clemens      CHIEF COMPLAINT: Consult (Bilateral carpal tunnel. EMG is scheduled for 3-. Previously seen by Dr. Burt. )      HISTORY OF PRESENT ILLNESS:  Arturo Islas is a 39 year old female who presents with 2 years of progressive numbness and tingling in all 5 fingers of her bilateral hands right worse than left.  She reports that this condition started without injury.  She reports that the numbness and tingling is worse at night as well as when she is driving in her car.  She is previously been seen with Dr. Burt who was concerned for carpal and cubital tunnel and had ordered an EMG study.    The patient has tried splinting for approximate the past year.  She reports that this helps, but she has difficulty remembering to put the splint on.  She is not had any injections or physical therapy.    She does report that she will occasionally have pain in the bilateral medial elbows this appears to alternate between the sides.    She is here for second opinion and wants to know if the EMG is necessary.  She additionally has questions if surgery is indicated.    She is right-handed.    ALLERGIES: Amoxicillin    MEDICATIONS:     Current Outpatient Medications:      albuterol (2.5 MG/3ML) 0.083% nebulizer solution, Take 1 vial (2.5 mg) by nebulization every 6 hours as needed for shortness of breath / dyspnea or wheezing, Disp: 180 vial, Rfl: 1     fluticasone (FLONASE) 50 MCG/ACT spray, Spray 1-2 sprays into both nostrils daily, Disp: 3 Bottle, Rfl: 1     hydrOXYzine (ATARAX) 10 MG tablet, Take 1 tablet (10 mg) by mouth every 8 hours as needed for itching or anxiety, Disp: 90 tablet, Rfl: 1     hydrOXYzine (ATARAX) 25 MG tablet, Take 1-2 tablets (25-50 mg) by mouth every 6 hours as needed for itching  or anxiety, Disp: 60 tablet, Rfl: 11     levocetirizine (XYZAL) 5 MG tablet, Take 1 tablet (5 mg) by mouth every evening, Disp: 90 tablet, Rfl: 3     losartan (COZAAR) 50 MG tablet, Take 1 tablet (50 mg) by mouth daily, Disp: 90 tablet, Rfl: 3     montelukast (SINGULAIR) 10 MG tablet, Take 1 tablet (10 mg) by mouth At Bedtime, Disp: 30 tablet, Rfl: 4     scopolamine (TRANSDERM) 1 MG/3DAYS 72 hr patch, Place 1 patch onto the skin every 72 hours Uses for travel, Disp: 3 patch, Rfl: 1      MEDICAL HISTORY:   Past Medical History:   Diagnosis Date     Hypertension        Reviewed  SURGICAL HISTORY:   Past Surgical History:   Procedure Laterality Date     ESOPHAGOSCOPY, GASTROSCOPY, DUODENOSCOPY (EGD), COMBINED N/A 7/16/2015    Procedure: COMBINED ESOPHAGOSCOPY, GASTROSCOPY, DUODENOSCOPY (EGD), BIOPSY SINGLE OR MULTIPLE;  Surgeon: Keagan Lozano MD;  Location: [facility]     INJECT EPIDURAL CAUDAL  6/25/2012    Procedure: INJECT EPIDURAL CAUDAL;  ANUEL Caudal--;  Surgeon: Provider, Generic Anesthesia;  Location: [facility]     [place] teeth pulled one  2008     Reviewed    FAMILY HISTORY:   Family History   Problem Relation Age of Onset     Diabetes Mother      Hypertension Mother      Arthritis Mother      Cardiovascular Father      Hypertension Father      Hypertension Brother      Hypertension Brother      Hypertension Brother      Hypertension Brother      Hypertension Sister      Hypertension Sister      Hypertension Brother      Hypertension Brother        No reported family history of musculoskeletal tumors or additional cancer history.     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    PHYSICAL EXAMINATION:  There were no vitals taken for this visit.  Data Unavailable  There is no height or weight on file to calculate BMI.    General: awake, alert, cooperative, no apparent distress, appears stated age  HEENT: normocephalic, atraumatic  Respiratory: breathing non-labored, no wheezing  Cardiovascular: nontachycardic  Skin: no rashes or lesions  Neurological: A&Ox3, CN II-XII grossly intact  Pysch: appropriate affect     Musculoskeletal:    Right Upper Extremity: No deformity, skin intact. No significant tenderness to palpation over clavicle, AC joint, shoulder, arm, elbow, forearm, wrist. Normal ROM shoulder, elbow, wrist without pain. Motor intact distally with finger flexion/extension/intrinsics/EPL, OK sign 5/5 strength. Radial pulse palpable, 2+.     Paresthesias are present in the median and ulnar nerve distribution.  Patient has maintained sensation to light touch with a Kleenex.  Positive carpal compression test.  Negative Tinel's over the carpal tunnel.  Negative Tinel's over the cubital tunnel.  Negative flexion compression test of the elbow.    Left Upper Extremity:     No deformity, skin intact. No significant tenderness to palpation over clavicle, AC joint, shoulder, arm, elbow, forearm, wrist. Normal ROM shoulder, elbow, wrist without pain. Motor intact distally with finger flexion/extension/intrinsics/EPL, OK sign 5/5 strength. Radial pulse palpable, 2+.     Paresthesias are present in the median and ulnar nerve distribution.  Patient has maintained sensation to light touch with a Kleenex.  Positive carpal compression test.  Negative Tinel's over the carpal tunnel.  Negative Tinel's over the cubital tunnel.  Negative flexion compression test of the elbow.    IMAGING:     EMG: Pending scheduled for 3/10/2020    ASSESSMENT/PLAN: Arturo Islas is a 39 year old female who presents for evaluation of 2 years of progressive bilateral right worse than left hand and finger  numbness.    1. Bilateral carpal tunnel  2. Bilateral cubital tunnel    We reviewed with the patient her clinical and radiographic findings today. We discussed that based on our assessment, the patient most likely has elements of bilateral median and ulnar nerve compressions at the carpal cubital tunnels respectively.  We did discuss with the patient that there are alternative etiologies such as a diffuse peripheral neuropathy and so a EMG would be valuable in this case to help isolate the exact positions of compression and confirm the diagnosis.    We discussed potential treatment options with the patient if these are indeed confirmed with EMG.  We discussed that given her duration of symptoms and the positive relief of the carpal tunnel with bracing but incomplete overall relief that we could consider surgical intervention.  We discussed staged side carpal and cubital tunnel releases.  We discussed the risks and benefits and alternatives to these procedures.  We discussed the expected postoperative course.    We will plan to call the patient with the results of the EMG study for her bilateral upper extremities to discuss next steps and possible surgery.    She is happy with this plan of care and all questions were answered to her satisfaction.     Patient was seen, discussed, and personally evaluated by Dr. Hoffmann who agrees with the above assessment and plan.     Kody Pizarro MD  Orthopedic Surgery PGY-4      Patient was seen and examined with the resident.  I agree with the assessment and plan of care.        Answers for HPI/ROS submitted by the patient on 2/25/2020   General Symptoms: No  Skin Symptoms: No  HENT Symptoms: No  EYE SYMPTOMS: No  HEART SYMPTOMS: No  LUNG SYMPTOMS: No  INTESTINAL SYMPTOMS: No  URINARY SYMPTOMS: No  GYNECOLOGIC SYMPTOMS: No  BREAST SYMPTOMS: No  SKELETAL SYMPTOMS: No  BLOOD SYMPTOMS: No  NERVOUS SYSTEM SYMPTOMS: Yes  MENTAL HEALTH SYMPTOMS: No  Trouble with coordination:  No  Dizziness or trouble with balance: No  Fainting or black-out spells: No  Memory loss: No  Headache: No  Seizures: No  Speech problems: No  Tingling: Yes  Tremor: No  Weakness: Yes  Difficulty walking: No  Paralysis: No  Numbness: Yes

## 2020-02-28 NOTE — LETTER
2/28/2020       RE: See Roshan  [address] [place] [phone]     Dear Colleague,    Thank you for referring your patient, See Roshan, to the [facility] ORTHOPAEDIC [facility]INIC at [facility]. Please see a copy of my visit note below.        Department of Orthopedic Surgery  Clinic Consultation Note      PATIENT NAME: Arturo Islas   MRN: [id number]  AGE: 39 year old  BMI: There is no height or weight on file to calculate BMI.  REFERRING PHYSICIAN: Marcia Clemens      CHIEF COMPLAINT: Consult (Bilateral carpal tunnel. EMG is scheduled for 3-. Previously seen by Dr. Burt. )      HISTORY OF PRESENT ILLNESS:  Arturo Islas is a 39 year old female who presents with 2 years of progressive numbness and tingling in all 5 fingers of her bilateral hands right worse than left.  She reports that this condition started without injury.  She reports that the numbness and tingling is worse at night as well as when she is driving in her car.  She is previously been seen with Dr. Burt who was concerned for carpal and cubital tunnel and had ordered an EMG study.    The patient has tried splinting for approximate the past year.  She reports that this helps, but she has difficulty remembering to put the splint on.  She is not had any injections or physical therapy.    She does report that she will occasionally have pain in the bilateral medial elbows this appears to alternate between the sides.    She is here for second opinion and wants to know if the EMG is necessary.  She additionally has questions if surgery is indicated.    She is right-handed.    ALLERGIES: Amoxicillin    MEDICATIONS:     Current Outpatient Medications:      albuterol (2.5 MG/3ML) 0.083% nebulizer solution, Take 1 vial (2.5 mg) by nebulization every 6 hours as needed for shortness of breath / dyspnea or wheezing, Disp: 180 vial, Rfl: 1     fluticasone (FLONASE) 50 MCG/ACT spray, Spray 1-2 sprays into both nostrils daily, Disp: 3  Bronchoscopy Note/ Electromagnetic navigation.  1. Bronchoscopy diagnostic.  2. Electromagnetic navigation to a lesion on the right lower lobe (RB6)  3. Radial EBUS to confirm accurate placement of a catheter near or in the targeted lesion.  4. FNA of a lesion on RB6 under same guidance.  7. Convex EBUS to sample lung nodule by BI.    Indication: Lung nodule.    Informed consent obtained from the patient prior to the procedure    Timeout performed, Verified patient identification, Verified procedure, Verified site/side and Verified correct patient position.    Sedation as per anesthesia. LMA placed and ventilation using a T- adaptor.   100% Fi02 administered to patient, Bronchoscope lubricated and airway entered and Lidocaine administered via laryngeal mask, total of 20 ml administered.       I inspected the oropharynx, epiglottis, vocal cords, trachea, main keaton, right and left bronchial tree with the following findings:  Normal vocal cords, trachea, main keaton, right and left bronchial tree. No endobronchial lesions were seen. The secretions looked clear.    Then we navigated the bronchoscope and guided catheter using computer software technology (path planned using raw imaging data) to the intended lesion on RB6. The lesion is outside the airway, 1 CM lateral to the BI      Then a radial ultrasound probe was navigated through the catheter until a density with ultrasonographic characteristics of a mass was found on the same area. Fluoroscopy was used to pablito and confirm catheter position. A needle was advanced through the catheter.    Then convex EBUS was used to localize the same lesion by the later wall of  and a 22 stephanie needle was advanced x 3.    There was some bleeding that required 5000 units of Thrombin and 2 mL of Epinephrine to stop.    Patient status during procedure:  Hemodynamics acceptable throughout procedure and 02 saturation stable throughout procedure    Complications:  None  No complications  Bottle, Rfl: 1     hydrOXYzine (ATARAX) 10 MG tablet, Take 1 tablet (10 mg) by mouth every 8 hours as needed for itching or anxiety, Disp: 90 tablet, Rfl: 1     hydrOXYzine (ATARAX) 25 MG tablet, Take 1-2 tablets (25-50 mg) by mouth every 6 hours as needed for itching or anxiety, Disp: 60 tablet, Rfl: 11     levocetirizine (XYZAL) 5 MG tablet, Take 1 tablet (5 mg) by mouth every evening, Disp: 90 tablet, Rfl: 3     losartan (COZAAR) 50 MG tablet, Take 1 tablet (50 mg) by mouth daily, Disp: 90 tablet, Rfl: 3     montelukast (SINGULAIR) 10 MG tablet, Take 1 tablet (10 mg) by mouth At Bedtime, Disp: 30 tablet, Rfl: 4     scopolamine (TRANSDERM) 1 MG/3DAYS 72 hr patch, Place 1 patch onto the skin every 72 hours Uses for travel, Disp: 3 patch, Rfl: 1      MEDICAL HISTORY:   Past Medical History:   Diagnosis Date     Hypertension        Reviewed  SURGICAL HISTORY:   Past Surgical History:   Procedure Laterality Date     ESOPHAGOSCOPY, GASTROSCOPY, DUODENOSCOPY (EGD), COMBINED N/A 7/16/2015    Procedure: COMBINED ESOPHAGOSCOPY, GASTROSCOPY, DUODENOSCOPY (EGD), BIOPSY SINGLE OR MULTIPLE;  Surgeon: Keagan Lozano MD;  Location: [facility]     INJECT EPIDURAL CAUDAL  6/25/2012    Procedure: INJECT EPIDURAL CAUDAL;  ANUEL Caudal--;  Surgeon: Provider, Generic Anesthesia;  Location: [facility]     [place] teeth pulled one  2008     Reviewed    FAMILY HISTORY:   Family History   Problem Relation Age of Onset     Diabetes Mother      Hypertension Mother      Arthritis Mother      Cardiovascular Father      Hypertension Father      Hypertension Brother      Hypertension Brother      Hypertension Brother      Hypertension Brother      Hypertension Sister      Hypertension Sister      Hypertension Brother      Hypertension Brother        No reported family history of musculoskeletal tumors or additional cancer history.     SOCIAL HISTORY:   Social History     Tobacco Use     Smoking status: Former Smoker     Packs/day: 0.50     Years:  were experienced. The patient recovered in full.  A chest x-ray has been requested to verify the absence of pneumothorax.      Piter Farrar M.D., F.C.C.P.         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    PHYSICAL EXAMINATION:  There were no vitals taken for this visit.  Data Unavailable  There is no height or weight on file to calculate BMI.    General: awake, alert, cooperative, no apparent distress, appears stated age  HEENT: normocephalic, atraumatic  Respiratory: breathing non-labored, no wheezing  Cardiovascular: nontachycardic  Skin: no rashes or lesions  Neurological: A&Ox3, CN II-XII grossly intact  Pysch: appropriate affect     Musculoskeletal:    Right Upper Extremity: No deformity, skin intact. No significant tenderness to palpation over clavicle, AC joint, shoulder, arm, elbow, forearm, wrist. Normal ROM shoulder, elbow, wrist without pain. Motor intact distally with finger flexion/extension/intrinsics/EPL, OK sign 5/5 strength. Radial pulse palpable, 2+.     Paresthesias are present in the median and ulnar nerve distribution.  Patient has maintained sensation to light touch with a Kleenex.  Positive carpal compression test.  Negative Tinel's over the carpal tunnel.  Negative Tinel's over the cubital tunnel.  Negative flexion compression test of the elbow.    Left Upper Extremity:     No deformity, skin intact. No significant tenderness to palpation over clavicle, AC joint, shoulder, arm, elbow, forearm, wrist. Normal ROM shoulder, elbow, wrist without pain. Motor intact distally with finger flexion/extension/intrinsics/EPL, OK sign 5/5 strength. Radial pulse palpable, 2+.     Paresthesias are present in the median and ulnar nerve distribution.  Patient has maintained sensation to light touch with a Kleenex.  Positive carpal compression test.  Negative Tinel's over the carpal tunnel.  Negative Tinel's over the cubital tunnel.  Negative flexion  compression test of the elbow.    IMAGING:     EMG: Pending scheduled for 3/10/2020    ASSESSMENT/PLAN: Arturo Islas is a 39 year old female who presents for evaluation of 2 years of progressive bilateral right worse than left hand and finger numbness.    1. Bilateral carpal tunnel  2. Bilateral cubital tunnel    We reviewed with the patient her clinical and radiographic findings today. We discussed that based on our assessment, the patient most likely has elements of bilateral median and ulnar nerve compressions at the carpal cubital tunnels respectively.  We did discuss with the patient that there are alternative etiologies such as a diffuse peripheral neuropathy and so a EMG would be valuable in this case to help isolate the exact positions of compression and confirm the diagnosis.    We discussed potential treatment options with the patient if these are indeed confirmed with EMG.  We discussed that given her duration of symptoms and the positive relief of the carpal tunnel with bracing but incomplete overall relief that we could consider surgical intervention.  We discussed staged side carpal and cubital tunnel releases.  We discussed the risks and benefits and alternatives to these procedures.  We discussed the expected postoperative course.    We will plan to call the patient with the results of the EMG study for her bilateral upper extremities to discuss next steps and possible surgery.    She is happy with this plan of care and all questions were answered to her satisfaction.     Patient was seen, discussed, and personally evaluated by Dr. Hoffmann who agrees with the above assessment and plan.     Kody Pizarro MD  Orthopedic Surgery PGY-4    Answers for HPI/ROS submitted by the patient on 2/25/2020   General Symptoms: No  Skin Symptoms: No  HENT Symptoms: No  EYE SYMPTOMS: No  HEART SYMPTOMS: No  LUNG SYMPTOMS: No  INTESTINAL SYMPTOMS: No  URINARY SYMPTOMS: No  GYNECOLOGIC SYMPTOMS: No  BREAST SYMPTOMS:  No  SKELETAL SYMPTOMS: No  BLOOD SYMPTOMS: No  NERVOUS SYSTEM SYMPTOMS: Yes  MENTAL HEALTH SYMPTOMS: No  Trouble with coordination: No  Dizziness or trouble with balance: No  Fainting or black-out spells: No  Memory loss: No  Headache: No  Seizures: No  Speech problems: No  Tingling: Yes  Tremor: No  Weakness: Yes  Difficulty walking: No  Paralysis: No  Numbness: Yes    Benjamin Hoffmann MD

## 2020-02-28 NOTE — NURSING NOTE
Reason For Visit:   Chief Complaint   Patient presents with     Consult     Bilateral carpal tunnel. EMG is scheduled for 3-. Previously seen by Dr. Burt.        Pain Assessment  Patient Currently in Pain: Other (Comment)(Paient stated that she has intermittent numbness in her hands. )        Allergies   Allergen Reactions     Amoxicillin Hives           Becky Huertas LPN

## 2020-03-10 ENCOUNTER — TELEPHONE (OUTPATIENT)
Dept: ORTHOPEDICS | Facility: CLINIC | Age: 40
End: 2020-03-10

## 2020-03-10 ENCOUNTER — OFFICE VISIT (OUTPATIENT)
Dept: NEUROLOGY | Facility: CLINIC | Age: 40
End: 2020-03-10
Attending: PLASTIC SURGERY
Payer: COMMERCIAL

## 2020-03-10 DIAGNOSIS — G56.03 BILATERAL CARPAL TUNNEL SYNDROME: ICD-10-CM

## 2020-03-10 NOTE — TELEPHONE ENCOUNTER
Discussed EMG results with Dr. Burt, positive for bilateral CTS, no issues with ulnar nerve at this time, but would recommend to continue to watch.    Called Pt to discuss results. Left VM with callback. Not detatiled.    Procedure: ?  Facility: [facility]  Length: 30 minutes  Anesthesia: Local  Post-op appointments needed: 2.5 weeks provider only, 6 weeks with provider only or next surgery depending on recovery progress.  Surgery packet/instructions given to patient?  to be mailed    Kishore Bañuelos RN

## 2020-03-10 NOTE — LETTER
3/10/2020       RE: See Roshan  [address] [place] [phone]     Dear Colleague,    Thank you for referring your patient, See Roshan, to the [facility]. Please see a copy of my visit note below.        [facility]  Electrodiagnostic Laboratory    Nerve Conduction & EMG Report    Patient:       Arturo Islas  Patient ID:    [id number]  Gender:        Female  YOB: 1980  Age:           39 Years 2 Months      Referring Physician: Benjamin Hoffmann MD    History & Examination:    39 year old woman with bilateral hand paresthesias in all digits, occasionally waking her up at night, and aggravated by repetitive hand motion. Query carpal tunnel syndrome vs ulnar neuropathies.     Techniques: Motor and sensory conduction studies were done with surface recording electrodes.      Results:    Bilateral median and ulnar antidromic sensory NCSs were normal. Bilateral median and ulnar orthodromic mixed NCSs done with stimulation at the palm showed prolonged peak latency differences (right side: 0.78 ms, left side: 0.94 ms, normal is <0.4 ms). Bilateral median-APB and ulnar-ADM motor NCSs were normal. Bilateral median to ulnar second lumbrical/palmar interossei comparison motor NCSs showed increased distal latency differences (right side: 0.72 ms, left side: 0.99 ms, normal is <0.5 ms). Needle EMG was deferred (reason: not needed to answer referral question).     Interpretation:    Abnormal study. There is electrodiagnostic evidence of mild bilateral median neuropathies at the wrist, as seen in carpal tunnel syndrome. There is no electrodiagnostic evidence of ulnar neuropathy on either side.     EMG Physician:    Ben Saab MD       Sensory NCS      Nerve / Sites Rec. Site Onset Peak Ref. NP Amp Ref. PP Amp Dist Gilberto Ref. Temp     ms ms ms  V  V  V cm m/s m/s  C   R MEDIAN - Dig II Anti      Wrist Dig II 2.92 3.70  30.3 10.0 42.0 14 48.0 48.0 31    L MEDIAN - Dig II Anti      Wrist Dig II 2.81 3.59  26.8 10.0 44.1 14 49.8 48.0 31.2   R ULNAR - Dig V Anti      Wrist Dig V 1.82 2.29  31.8 8.0 55.8 12.5 68.6 48.0 31.1   L ULNAR - Dig V Anti      Wrist Dig V 1.77 2.34  20.9 8.0 35.3 12.5 70.6 48.0 31.9   R MEDIAN - Ulnar - Palmar      Median Wrist 1.93 2.34 2.40 37.7  55.0 8 41.5  31.3      Ulnar Wrist 1.20 1.56 2.40 9.3  17.5 8 66.8  31.3   L MEDIAN - Ulnar - Palmar      Median Wrist 2.03 2.55 2.40 27.1  34.3 8 39.4  32.1      Ulnar Wrist 1.20 1.61 2.40 5.9  9.2 8 66.8  31.1       Motor NCS      Nerve / Sites Rec. Site Lat Ref. Amp Ref. Rel Amp Dist Gilberto Ref. Dur. Area Temp.     ms ms mV mV % cm m/s m/s ms %  C   R MEDIAN - APB      Wrist APB 4.38 4.40 9.3 5.0 100 8   5.99 100 31.5      Elbow APB 7.60  7.0  75.1 17 52.6 48.0 6.35 68.5 31.6   L MEDIAN - APB      Wrist APB 4.32 4.40 9.9 5.0 100 8   5.83 100 31.2      Elbow APB 7.55  8.5  86.6 19 58.8 48.0 5.68 72.5 31.5   R ULNAR - ADM      Wrist ADM 2.03 3.50 10.5 5.0 100 8   5.21 100 31.5      B.Elbow ADM 5.10  10.1  95.8 19 61.8 48.0 5.47 93.6 31.5      A.Elbow ADM 6.72  8.7  82.5 9 55.7 48.0 5.73 82.4 31.5   L ULNAR - ADM      Wrist ADM 2.34 3.50 10.3 5.0 100 8   5.05 100 31.3      B.Elbow ADM 5.05  9.7  93.4 17 62.8 48.0 5.26 94.7 31.3      A.Elbow ADM 6.61  9.3  89.5 9 57.6 48.0 5.57 94.3 31.3   R MEDIAN - II Lumb      Median II Lumb 3.85  1.6  100 10   6.61 100 31.2      Ulnar Palm Int 3.13  2.2  139 10   4.11 66.2 31.3   L MEDIAN - II Lumb      Median II Lumb 4.01  2.1  100 10   5.99 100 32      Ulnar Palm Int 3.02  3.2  151 10   4.64 97.4 32                                Again, thank you for allowing me to participate in the care of your patient.      Sincerely,    Ben Saab MD

## 2020-03-10 NOTE — PROGRESS NOTES
[facility]  Electrodiagnostic Laboratory    Nerve Conduction & EMG Report          Patient:       Arturo Islas  Patient ID:    [id number]  Gender:        Female  YOB: 1980  Age:           39 Years 2 Months      Referring Physician: Benjamin Hoffmann MD    History & Examination:    39 year old woman with bilateral hand paresthesias in all digits, occasionally waking her up at night, and aggravated by repetitive hand motion. Query carpal tunnel syndrome vs ulnar neuropathies.     Techniques: Motor and sensory conduction studies were done with surface recording electrodes.      Results:    Bilateral median and ulnar antidromic sensory NCSs were normal. Bilateral median and ulnar orthodromic mixed NCSs done with stimulation at the palm showed prolonged peak latency differences (right side: 0.78 ms, left side: 0.94 ms, normal is <0.4 ms). Bilateral median-APB and ulnar-ADM motor NCSs were normal. Bilateral median to ulnar second lumbrical/palmar interossei comparison motor NCSs showed increased distal latency differences (right side: 0.72 ms, left side: 0.99 ms, normal is <0.5 ms). Needle EMG was deferred (reason: not needed to answer referral question).     Interpretation:    Abnormal study. There is electrodiagnostic evidence of mild bilateral median neuropathies at the wrist, as seen in carpal tunnel syndrome. There is no electrodiagnostic evidence of ulnar neuropathy on either side.     EMG Physician:    Ben Saab MD       Sensory NCS      Nerve / Sites Rec. Site Onset Peak Ref. NP Amp Ref. PP Amp Dist Gilberto Ref. Temp     ms ms ms  V  V  V cm m/s m/s  C   R MEDIAN - Dig II Anti      Wrist Dig II 2.92 3.70  30.3 10.0 42.0 14 48.0 48.0 31   L MEDIAN - Dig II Anti      Wrist Dig II 2.81 3.59  26.8 10.0 44.1 14 49.8 48.0 31.2   R ULNAR - Dig V Anti      Wrist Dig V 1.82 2.29  31.8 8.0 55.8 12.5 68.6 48.0 31.1   L ULNAR - Dig V Anti      Wrist Dig V 1.77 2.34  20.9 8.0 35.3 12.5 70.6  48.0 31.9   R MEDIAN - Ulnar - Palmar      Median Wrist 1.93 2.34 2.40 37.7  55.0 8 41.5  31.3      Ulnar Wrist 1.20 1.56 2.40 9.3  17.5 8 66.8  31.3   L MEDIAN - Ulnar - Palmar      Median Wrist 2.03 2.55 2.40 27.1  34.3 8 39.4  32.1      Ulnar Wrist 1.20 1.61 2.40 5.9  9.2 8 66.8  31.1       Motor NCS      Nerve / Sites Rec. Site Lat Ref. Amp Ref. Rel Amp Dist Gilberto Ref. Dur. Area Temp.     ms ms mV mV % cm m/s m/s ms %  C   R MEDIAN - APB      Wrist APB 4.38 4.40 9.3 5.0 100 8   5.99 100 31.5      Elbow APB 7.60  7.0  75.1 17 52.6 48.0 6.35 68.5 31.6   L MEDIAN - APB      Wrist APB 4.32 4.40 9.9 5.0 100 8   5.83 100 31.2      Elbow APB 7.55  8.5  86.6 19 58.8 48.0 5.68 72.5 31.5   R ULNAR - ADM      Wrist ADM 2.03 3.50 10.5 5.0 100 8   5.21 100 31.5      B.Elbow ADM 5.10  10.1  95.8 19 61.8 48.0 5.47 93.6 31.5      A.Elbow ADM 6.72  8.7  82.5 9 55.7 48.0 5.73 82.4 31.5   L ULNAR - ADM      Wrist ADM 2.34 3.50 10.3 5.0 100 8   5.05 100 31.3      B.Elbow ADM 5.05  9.7  93.4 17 62.8 48.0 5.26 94.7 31.3      A.Elbow ADM 6.61  9.3  89.5 9 57.6 48.0 5.57 94.3 31.3   R MEDIAN - II Lumb      Median II Lumb 3.85  1.6  100 10   6.61 100 31.2      Ulnar Palm Int 3.13  2.2  139 10   4.11 66.2 31.3   L MEDIAN - II Lumb      Median II Lumb 4.01  2.1  100 10   5.99 100 32      Ulnar Palm Int 3.02  3.2  151 10   4.64 97.4 32

## 2020-05-03 DIAGNOSIS — J30.89 NON-SEASONAL ALLERGIC RHINITIS DUE TO OTHER ALLERGIC TRIGGER: ICD-10-CM

## 2020-05-05 RX ORDER — MONTELUKAST SODIUM 10 MG/1
TABLET ORAL
Qty: 90 TABLET | Refills: 3 | Status: SHIPPED | OUTPATIENT
Start: 2020-05-05 | End: 2021-03-16

## 2020-05-05 NOTE — TELEPHONE ENCOUNTER
Prescription approved per [facility] Refill Protocol.  Magno Forte RN

## 2020-05-16 ENCOUNTER — OFFICE VISIT - HEALTHEAST (OUTPATIENT)
Dept: FAMILY MEDICINE | Facility: CLINIC | Age: 40
End: 2020-05-16

## 2020-05-16 DIAGNOSIS — Z20.822 SUSPECTED 2019 NOVEL CORONAVIRUS INFECTION: ICD-10-CM

## 2020-05-17 ENCOUNTER — RECORDS - HEALTHEAST (OUTPATIENT)
Dept: LAB | Facility: CLINIC | Age: 40
End: 2020-05-17

## 2020-05-17 LAB
SARS-COV-2 PCR COMMENT: NORMAL
SARS-COV-2 RNA SPEC QL NAA+PROBE: NEGATIVE
SARS-COV-2 VIRUS SPECIMEN SOURCE: NORMAL

## 2020-09-02 DIAGNOSIS — J30.2 SEASONAL ALLERGIC RHINITIS, UNSPECIFIED TRIGGER: Primary | ICD-10-CM

## 2020-09-02 DIAGNOSIS — H10.45 CHRONIC ALLERGIC CONJUNCTIVITIS: ICD-10-CM

## 2020-09-02 DIAGNOSIS — J30.2 SEASONAL ALLERGIC RHINITIS: ICD-10-CM

## 2020-09-02 RX ORDER — FLUTICASONE PROPIONATE 50 MCG
1-2 SPRAY, SUSPENSION (ML) NASAL DAILY
Qty: 48 G | Refills: 1 | Status: SHIPPED | OUTPATIENT
Start: 2020-09-02 | End: 2021-12-04

## 2020-09-02 NOTE — TELEPHONE ENCOUNTER
Patient requesting refill for her allergies.   Eye Drops and Flonase.   [facility].   Lenora Barclay, TEMI

## 2020-10-22 ENCOUNTER — E-VISIT (OUTPATIENT)
Dept: FAMILY MEDICINE | Facility: CLINIC | Age: 40
End: 2020-10-22
Payer: COMMERCIAL

## 2020-10-22 DIAGNOSIS — Z20.822 PERSON UNDER INVESTIGATION FOR COVID-19: Primary | ICD-10-CM

## 2020-10-22 PROCEDURE — 99421 OL DIG E/M SVC 5-10 MIN: CPT | Performed by: PHYSICIAN ASSISTANT

## 2020-11-29 ENCOUNTER — HEALTH MAINTENANCE LETTER (OUTPATIENT)
Age: 40
End: 2020-11-29

## 2021-01-12 ENCOUNTER — TELEPHONE (OUTPATIENT)
Dept: FAMILY MEDICINE | Facility: CLINIC | Age: 41
End: 2021-01-12

## 2021-01-12 DIAGNOSIS — J00 ACUTE RHINITIS: Primary | ICD-10-CM

## 2021-01-12 RX ORDER — PSEUDOEPHEDRINE HCL 30 MG
30-60 TABLET ORAL EVERY 4 HOURS PRN
Qty: 200 TABLET | Refills: 1 | Status: SHIPPED | OUTPATIENT
Start: 2021-01-12 | End: 2022-03-16

## 2021-01-12 NOTE — TELEPHONE ENCOUNTER
Patient medication request:    Lenora.. can you get [facility] to write a script for sudafed for me... I currently have a sinus infection (day 10+) and dont want to do any meds yet until after my covid vaccine. just need more sudafed. already went through 50+ tablets within a week.      Lenora Barclay, [facility]

## 2021-03-16 ENCOUNTER — OFFICE VISIT (OUTPATIENT)
Dept: FAMILY MEDICINE | Facility: CLINIC | Age: 41
End: 2021-03-16
Payer: COMMERCIAL

## 2021-03-16 VITALS
BODY MASS INDEX: 41.53 KG/M2 | OXYGEN SATURATION: 95 % | WEIGHT: 206 LBS | DIASTOLIC BLOOD PRESSURE: 82 MMHG | SYSTOLIC BLOOD PRESSURE: 124 MMHG | TEMPERATURE: 98.7 F | HEART RATE: 105 BPM | HEIGHT: 59 IN

## 2021-03-16 DIAGNOSIS — R06.02 SOB (SHORTNESS OF BREATH): ICD-10-CM

## 2021-03-16 DIAGNOSIS — F43.23 ADJUSTMENT DISORDER WITH MIXED ANXIETY AND DEPRESSED MOOD: ICD-10-CM

## 2021-03-16 DIAGNOSIS — K21.00 GASTROESOPHAGEAL REFLUX DISEASE WITH ESOPHAGITIS WITHOUT HEMORRHAGE: ICD-10-CM

## 2021-03-16 DIAGNOSIS — R06.2 WHEEZING: ICD-10-CM

## 2021-03-16 DIAGNOSIS — Z00.00 ROUTINE GENERAL MEDICAL EXAMINATION AT A HEALTH CARE FACILITY: Primary | ICD-10-CM

## 2021-03-16 DIAGNOSIS — I10 ESSENTIAL HYPERTENSION: ICD-10-CM

## 2021-03-16 PROCEDURE — 99395 PREV VISIT EST AGE 18-39: CPT | Performed by: FAMILY MEDICINE

## 2021-03-16 PROCEDURE — 99213 OFFICE O/P EST LOW 20 MIN: CPT | Mod: 25 | Performed by: FAMILY MEDICINE

## 2021-03-16 RX ORDER — LOSARTAN POTASSIUM 50 MG/1
50 TABLET ORAL DAILY
Qty: 90 TABLET | Refills: 3 | Status: SHIPPED | OUTPATIENT
Start: 2021-03-16 | End: 2022-03-02

## 2021-03-16 RX ORDER — GUAIFENESIN AND DEXTROMETHORPHAN HYDROBROMIDE 600; 30 MG/1; MG/1
1 TABLET, EXTENDED RELEASE ORAL EVERY 12 HOURS
Qty: 180 TABLET | Refills: 3 | Status: SHIPPED | OUTPATIENT
Start: 2021-03-16 | End: 2022-05-16

## 2021-03-16 RX ORDER — FAMOTIDINE 20 MG/1
20 TABLET, FILM COATED ORAL 2 TIMES DAILY
Qty: 90 TABLET | Refills: 3 | Status: SHIPPED | OUTPATIENT
Start: 2021-03-16 | End: 2022-02-17

## 2021-03-16 RX ORDER — HYDROXYZINE HYDROCHLORIDE 25 MG/1
25-50 TABLET, FILM COATED ORAL EVERY 6 HOURS PRN
Qty: 60 TABLET | Refills: 11 | Status: SHIPPED | OUTPATIENT
Start: 2021-03-16 | End: 2024-06-17

## 2021-03-16 RX ORDER — ALBUTEROL SULFATE 0.83 MG/ML
2.5 SOLUTION RESPIRATORY (INHALATION) EVERY 6 HOURS PRN
Qty: 90 ML | Refills: 3 | Status: SHIPPED | OUTPATIENT
Start: 2021-03-16 | End: 2022-10-19

## 2021-03-16 ASSESSMENT — MIFFLIN-ST. JEOR: SCORE: 1515.04

## 2021-03-16 NOTE — PROGRESS NOTES
"    Assessment & Plan     (Z00.00) Routine general medical examination at a health care facility  (primary encounter diagnosis)  Comment:   Plan:     (F43.23) Adjustment disorder with mixed anxiety and depressed mood  Comment:   Plan: hydrOXYzine (ATARAX) 25 MG tablet        Doing well on this     (I10) Essential hypertension  Comment:   Plan: losartan (COZAAR) 50 MG tablet        Good control    (R06.2) Wheezing  Comment:   Plan: albuterol (PROVENTIL) (2.5 MG/3ML) 0.083% neb         solution, dextromethorphan-guaiFENesin (MUCINEX        DM)  MG 12 hr tablet            (R06.02) SOB (shortness of breath)  Comment:   Plan: albuterol (PROVENTIL) (2.5 MG/3ML) 0.083% neb         solution        Has used in past with good results     (K21.00) Gastroesophageal reflux disease with esophagitis without hemorrhage  Comment:   Plan: famotidine (PEPCID) 20 MG tablet        Takes for heartburn.                BMI:   Estimated body mass index is 41.61 kg/m  as calculated from the following:    Height as of this encounter: 1.499 m (4' 11\").    Weight as of this encounter: 93.4 kg (206 lb).   Weight management plan: Discussed healthy diet and exercise guidelines    FUTURE APPOINTMENTS:       - Follow-up for annual visit or as needed    Return in about 1 year (around 3/16/2022) for Physical Exam, Lab Work, BP Recheck.    Marcia Clemens MD  [facility] Morris is a 40 year old who presents for the following health issues     HPI     Cough started last Wednesday, worse on Friday. God son over may have gotten her sick.   Unable to sleep at night from the cough. Nasal and chest congestion.Also taking mucinex and sudafed.       Hypertension Follow-up    Do you check your blood pressure regularly outside of the clinic? No     Are you following a low salt diet? Yes    Are your blood pressures ever more than 140 on the top number (systolic) OR more   than 90 on the bottom number (diastolic), for example " 140/90? No    Depression and Anxiety Follow-Up    How are you doing with your depression since your last visit? stable    How are you doing with your anxiety since your last visit?  Stable     Are you having other symptoms that might be associated with depression or anxiety? No    Have you had a significant life event? No     Do you have any concerns with your use of alcohol or other drugs? No    Social History     Tobacco Use     Smoking status: Former Smoker     Packs/day: 0.50     Years: 10.00     Pack years: 5.00     Types: Cigarettes     Quit date: 2005     Years since quittin.2     Smokeless tobacco: Never Used   Substance Use Topics     Alcohol use: No     Drug use: No     PHQ 10/16/2017 8/3/2018 2019   PHQ-9 Total Score 10 5 5   Q9: Thoughts of better off dead/self-harm past 2 weeks Not at all Not at all Not at all     TERENCE-7 SCORE 2015 8/3/2018 2019   Total Score 19 - -   Total Score - 6 3     Last PHQ-9 2019   1.  Little interest or pleasure in doing things 0   2.  Feeling down, depressed, or hopeless 1   3.  Trouble falling or staying asleep, or sleeping too much 0   4.  Feeling tired or having little energy 1   5.  Poor appetite or overeating 2   6.  Feeling bad about yourself 1   7.  Trouble concentrating 0   8.  Moving slowly or restless 0   Q9: Thoughts of better off dead/self-harm past 2 weeks 0   PHQ-9 Total Score 5   Difficulty at work, home, or with people Somewhat difficult     TERENCE-7  2019   1. Feeling nervous, anxious, or on edge 1   2. Not being able to stop or control worrying 1   3. Worrying too much about different things 1   4. Trouble relaxing 0   5. Being so restless that it is hard to sit still 0   6. Becoming easily annoyed or irritable 0   7. Feeling afraid, as if something awful might happen 0   TERENCE-7 Total Score 3   If you checked any problems, how difficult have they made it for you to do your work, take care of things at home, or get along with other  "people? Somewhat difficult       she still has the carpal tunnel not too bad so no surgery at [facility] had to start wearing the splints again. these give her relief                 Review of Systems   Constitutional, HEENT, cardiovascular, pulmonary, gi and gu systems are negative, except as otherwise noted.      Objective    /82   Pulse 105   Temp 98.7  F (37.1  C) (Tympanic)   Ht 1.499 m (4' 11\")   Wt 93.4 kg (206 lb)   SpO2 95%   BMI 41.61 kg/m    Body mass index is 41.61 kg/m .  Physical Exam   GENERAL: healthy, alert and no distress  EYES: Eyes grossly normal to inspection, PERRL and conjunctivae and sclerae normal  HENT: ear canals and TM's normal, nose and mouth without ulcers or lesions  NECK: no adenopathy, no asymmetry, masses, or scars and thyroid normal to palpation  RESP: lungs clear to auscultation - no rales, rhonchi or wheezes  BREAST: normal without masses, tenderness or nipple discharge and no palpable axillary masses or adenopathy  CV: regular rate and rhythm, normal S1 S2, no S3 or S4, no murmur, click or rub, no peripheral edema and peripheral pulses strong  ABDOMEN: soft, nontender, no hepatosplenomegaly, no masses and bowel sounds normal  MS: no gross musculoskeletal defects noted, no edema  SKIN: no suspicious lesions or rashes  NEURO: Normal strength and tone, mentation intact and speech normal  PSYCH: mentation appears normal, affect normal/bright    No results found for any visits on 03/16/21.    Marcia Clemens M.D.          "

## 2021-03-17 ENCOUNTER — TELEPHONE (OUTPATIENT)
Dept: FAMILY MEDICINE | Facility: CLINIC | Age: 41
End: 2021-03-17

## 2021-03-17 DIAGNOSIS — J20.9 ACUTE BRONCHITIS WITH SYMPTOMS > 10 DAYS: Primary | ICD-10-CM

## 2021-03-17 RX ORDER — AZITHROMYCIN 250 MG/1
TABLET, FILM COATED ORAL
Qty: 6 TABLET | Refills: 0 | Status: SHIPPED | OUTPATIENT
Start: 2021-03-17 | End: 2021-03-22

## 2021-03-17 NOTE — TELEPHONE ENCOUNTER
"Dr. Clemens:   See stopped this writer in the hallway and said, \"Can you ask Dr. Clemens to order me an antibiotic? My cough is worse and I am just dying. I am staying away from my coworkers.\"  Magno Forte RN    "

## 2021-03-29 DIAGNOSIS — R05.3 CHRONIC COUGH: ICD-10-CM

## 2021-06-08 NOTE — PATIENT INSTRUCTIONS - HE
Your symptoms show that you may have coronavirus (COVID-19). This illness can cause fever, cough and trouble breathing. Many people get a mild case and get better on their own. Some people can get very sick.     Not all patients are tested for COVID-19. If you need to be tested, your care team will let you know.     How can I protect others?    Without a test, we can't know for sure that you have COVID-19. For safety, it's very important to follow these rules.    Stay home and away from others (self-isolate) until:    At least 10 days have passed since your symptoms started. And     You've had no fever--and no medicine that reduces fever--for 3 full days (72 hours). And      Your other symptoms have resolved (gotten better).     During this time:    Stay in your own room (and use your own bathroom), if you can.    Stay away from others in your home. No hugging, kissing or shaking hands.    No visitors.    Don't go to work, school or anywhere else.     Clean  high touch  surfaces often (doorknobs, counters, handles, etc.). Use a household cleaning spray or wipes.    Cover your mouth and nose with a mask, tissue or wash cloth to avoid spreading germs.    Wash your hands and face often. Use soap and water.    For more tips, go to https://www.cdc.gov/coronavirus/2019-ncov/downloads/10Things.pdf.    How can I take care of myself?    1. Get lots of rest. Drink extra fluids (unless a doctor has told you not to).     2. Take Tylenol (acetaminophen) for fever or pain. If you have liver or kidney problems, ask your family doctor if it's okay to take Tylenol.     Adults can take either:     650 mg (two 325 mg pills) every 4 to 6 hours, or     1,000 mg (two 500 mg pills) every 8 hours as needed.     Note: Don't take more than 3,000 mg in one day.   Acetaminophen is found in many medicines (both prescribed and over-the-counter medicines). Read all labels to be sure you don't take too much.   For children, check the Tylenol  bottle for the right dose. The dose is based on the child's age or weight.    3. If you have other health problems (like cancer, heart failure, an organ transplant or severe kidney disease): Call your specialty clinic if you don't feel better in the next 2 days.    4. Know when to call 911: If your breathing is so bad that it keeps you from doing normal activities, call 911 or go to the emergency room. Tell them that you've been staying home and may have COVID-19.      What are the symptoms of COVID-19?     The most common symptoms are cough, fever and trouble breathing.     Less common symptoms include body aches, chills, diarrhea (loose, watery poops), fatigue (feeling very tired), headache, runny nose, sore throat and loss of smell.     COVID-19 can cause severe coughing (bronchitis) and lung infection (pneumonia).    How does it spread?     The virus may spread when a person coughs or sneezes into the air. The virus can travel about 6 feet this way, and it can live on surfaces.      Common  (household disinfectants) will kill the virus.    Who is at risk?  Anyone can catch COVID-19 if they're around someone who has the virus.    How can others protect themselves?     Stay away from people who have COVID-19 (or symptoms of COVID-19).    Wash hands often with soap and water. Or, use hand  with at least 60% alcohol.    Avoid touching the eyes, nose or mouth.     Wear a face mask when you go out in public, when sick or when caring for a sick person.      For more about COVID-19 and caring for yourself at home, please visit the CDC website at https://www.cdc.gov/coronavirus/2019-ncov/about/steps-when-sick.html.     To learn about care at [facility], go to [email or web address].    Below are the COVID-19 hotlines at the [place] [facility] ([facility]). Interpreters are available.     For health questions: Call 108-016-4796 or 1-490.369.5346 (7 a.m. to 7  p.m.)    For questions about schools and childcare: Call 766-241-8073 or 1-643.198.3740 (7 a.m. to 7 p.m.)

## 2021-09-25 ENCOUNTER — HEALTH MAINTENANCE LETTER (OUTPATIENT)
Age: 41
End: 2021-09-25

## 2021-10-12 DIAGNOSIS — T75.3XXD MOTION SICKNESS, SUBSEQUENT ENCOUNTER: ICD-10-CM

## 2021-10-12 NOTE — TELEPHONE ENCOUNTER
Routing refill request to provider for review/approval because:  Drug not on the [facility] refill protocol     Cherelle Macias RN

## 2021-10-13 RX ORDER — SCOLOPAMINE TRANSDERMAL SYSTEM 1 MG/1
PATCH, EXTENDED RELEASE TRANSDERMAL
Qty: 3 PATCH | Refills: 1 | Status: SHIPPED | OUTPATIENT
Start: 2021-10-13 | End: 2022-07-07

## 2021-11-19 ENCOUNTER — APPOINTMENT (OUTPATIENT)
Dept: URGENT CARE | Facility: CLINIC | Age: 41
End: 2021-11-19
Payer: COMMERCIAL

## 2021-12-02 DIAGNOSIS — J30.2 SEASONAL ALLERGIC RHINITIS, UNSPECIFIED TRIGGER: ICD-10-CM

## 2021-12-02 DIAGNOSIS — H10.45 CHRONIC ALLERGIC CONJUNCTIVITIS: ICD-10-CM

## 2021-12-04 RX ORDER — FLUTICASONE PROPIONATE 50 MCG
SPRAY, SUSPENSION (ML) NASAL
Qty: 48 G | Refills: 1 | Status: SHIPPED | OUTPATIENT
Start: 2021-12-04 | End: 2022-04-08

## 2021-12-04 NOTE — TELEPHONE ENCOUNTER
Routing eye drop refill request to Provider because it is not on the RN refill protocol.  Thank you.  Magno Forte, RN

## 2022-02-16 DIAGNOSIS — K21.00 GASTROESOPHAGEAL REFLUX DISEASE WITH ESOPHAGITIS WITHOUT HEMORRHAGE: ICD-10-CM

## 2022-02-16 RX ORDER — FAMOTIDINE 20 MG/1
20 TABLET, FILM COATED ORAL 2 TIMES DAILY
Qty: 90 TABLET | Refills: 3 | OUTPATIENT
Start: 2022-02-16

## 2022-02-17 DIAGNOSIS — K21.00 GASTROESOPHAGEAL REFLUX DISEASE WITH ESOPHAGITIS WITHOUT HEMORRHAGE: ICD-10-CM

## 2022-02-17 RX ORDER — FAMOTIDINE 20 MG/1
20 TABLET, FILM COATED ORAL 2 TIMES DAILY
Qty: 90 TABLET | Refills: 0 | Status: SHIPPED | OUTPATIENT
Start: 2022-02-17 | End: 2022-04-08

## 2022-02-17 NOTE — TELEPHONE ENCOUNTER
Prescription approved per [facility] Refill Protocol.    Shyam Zaman RN

## 2022-02-17 NOTE — TELEPHONE ENCOUNTER
Patient is looking to fill her Famotidine- our last request was denied for requesting too soon however she takes 2 tablets per day so qty 90 is only a 45 day supply so I'm not sure why it was denied for being to soon. Filled 12/2/21 for 45 days would only be enough tablets until 1/16/22. Please review and send us a new order.     Thanks   [facility]   [phone]

## 2022-03-15 DIAGNOSIS — J00 ACUTE RHINITIS: ICD-10-CM

## 2022-03-15 NOTE — TELEPHONE ENCOUNTER
Routing refill request to provider for review/approval because:  Drug not on the [facility] refill protocol   Thank you.  Magno Forte RN

## 2022-03-16 RX ORDER — PSEUDOEPHEDRINE HCL 30 MG/1
TABLET, FILM COATED ORAL
Qty: 200 TABLET | Refills: 1 | Status: SHIPPED | OUTPATIENT
Start: 2022-03-16 | End: 2022-12-07

## 2022-03-28 ENCOUNTER — TELEPHONE (OUTPATIENT)
Dept: FAMILY MEDICINE | Facility: CLINIC | Age: 42
End: 2022-03-28
Payer: COMMERCIAL

## 2022-03-28 DIAGNOSIS — I10 ESSENTIAL HYPERTENSION: Primary | ICD-10-CM

## 2022-03-28 DIAGNOSIS — Z13.6 CARDIOVASCULAR SCREENING; LDL GOAL LESS THAN 130: ICD-10-CM

## 2022-03-28 NOTE — TELEPHONE ENCOUNTER
Patient called requesting appointment for annual exam.  Patient was scheduled for annual exam 4/1/22 and provider needed to cancel the appointment,next available was June.  This writer scheduled annual exam 4/8/22, advised to be fasting 12 hours prior to appointment.  Lab orders placed per HM.  Sintia Alford RN

## 2022-04-05 ENCOUNTER — ALLIED HEALTH/NURSE VISIT (OUTPATIENT)
Dept: FAMILY MEDICINE | Facility: CLINIC | Age: 42
End: 2022-04-05
Payer: COMMERCIAL

## 2022-04-05 VITALS — HEART RATE: 86 BPM | DIASTOLIC BLOOD PRESSURE: 88 MMHG | SYSTOLIC BLOOD PRESSURE: 136 MMHG

## 2022-04-05 DIAGNOSIS — I10 HYPERTENSION: Primary | ICD-10-CM

## 2022-04-05 PROCEDURE — 99207 PR NO CHARGE NURSE ONLY: CPT | Performed by: FAMILY MEDICINE

## 2022-04-05 NOTE — NURSING NOTE
See Roshan was evaluated at [facility] on April 5, 2022 at which time her blood pressure was:    BP Readings from Last 3 Encounters:   04/05/22 136/88   03/16/21 124/82   01/21/20 124/78     Pulse Readings from Last 3 Encounters:   04/05/22 86   03/16/21 105   01/21/20 80       Reviewed lifestyle modifications for blood pressure control and reduction: including making healthy food choices, managing weight, getting regular exercise, smoking cessation, reducing alcohol consumption, monitoring blood pressure regularly.     Symptoms: None    BP Goal:< 140/90 mmHg    BP Assessment:  BP at goal    Potential Reasons for BP too high: NA - Not applicable    BP Follow-Up Plan: Recheck BP in 6 months at pharmacy    Recommendation to Provider: none    Note completed by: Daniela Mancini, PharmD  [facility]  [phone]

## 2022-04-07 ASSESSMENT — ENCOUNTER SYMPTOMS
ARTHRALGIAS: 0
HEMATOCHEZIA: 0
SHORTNESS OF BREATH: 0
BREAST MASS: 0
FEVER: 0
DIZZINESS: 0
DIARRHEA: 0
ABDOMINAL PAIN: 0
FREQUENCY: 0
PALPITATIONS: 0
COUGH: 0
NERVOUS/ANXIOUS: 0
HEARTBURN: 0
JOINT SWELLING: 0
NAUSEA: 0
WEAKNESS: 0
CHILLS: 0
HEADACHES: 0
HEMATURIA: 0
SORE THROAT: 0
MYALGIAS: 0
EYE PAIN: 0
CONSTIPATION: 0
DYSURIA: 0
PARESTHESIAS: 0

## 2022-04-08 ENCOUNTER — OFFICE VISIT (OUTPATIENT)
Dept: FAMILY MEDICINE | Facility: CLINIC | Age: 42
End: 2022-04-08
Payer: COMMERCIAL

## 2022-04-08 VITALS
WEIGHT: 213 LBS | DIASTOLIC BLOOD PRESSURE: 74 MMHG | TEMPERATURE: 98.2 F | SYSTOLIC BLOOD PRESSURE: 126 MMHG | HEART RATE: 81 BPM | BODY MASS INDEX: 42.94 KG/M2 | OXYGEN SATURATION: 98 % | HEIGHT: 59 IN

## 2022-04-08 DIAGNOSIS — Z11.4 SCREENING FOR HIV (HUMAN IMMUNODEFICIENCY VIRUS): ICD-10-CM

## 2022-04-08 DIAGNOSIS — N92.6 IRREGULAR MENSTRUAL BLEEDING: ICD-10-CM

## 2022-04-08 DIAGNOSIS — E66.01 MORBID OBESITY (H): ICD-10-CM

## 2022-04-08 DIAGNOSIS — Z00.00 ROUTINE GENERAL MEDICAL EXAMINATION AT A HEALTH CARE FACILITY: Primary | ICD-10-CM

## 2022-04-08 DIAGNOSIS — K21.00 GASTROESOPHAGEAL REFLUX DISEASE WITH ESOPHAGITIS WITHOUT HEMORRHAGE: ICD-10-CM

## 2022-04-08 DIAGNOSIS — I10 ESSENTIAL HYPERTENSION: ICD-10-CM

## 2022-04-08 DIAGNOSIS — Z11.59 NEED FOR HEPATITIS C SCREENING TEST: ICD-10-CM

## 2022-04-08 DIAGNOSIS — J30.2 SEASONAL ALLERGIC RHINITIS, UNSPECIFIED TRIGGER: ICD-10-CM

## 2022-04-08 LAB
ALBUMIN SERPL-MCNC: 4.3 G/DL (ref 3.4–5)
ALP SERPL-CCNC: 62 U/L (ref 40–150)
ALT SERPL W P-5'-P-CCNC: 34 U/L (ref 0–50)
ANION GAP SERPL CALCULATED.3IONS-SCNC: 6 MMOL/L (ref 3–14)
AST SERPL W P-5'-P-CCNC: 17 U/L (ref 0–45)
BILIRUB SERPL-MCNC: 0.6 MG/DL (ref 0.2–1.3)
BUN SERPL-MCNC: 15 MG/DL (ref 7–30)
CALCIUM SERPL-MCNC: 9.2 MG/DL (ref 8.5–10.1)
CHLORIDE BLD-SCNC: 105 MMOL/L (ref 94–109)
CHOLEST SERPL-MCNC: 177 MG/DL
CO2 SERPL-SCNC: 28 MMOL/L (ref 20–32)
CREAT SERPL-MCNC: 0.55 MG/DL (ref 0.52–1.04)
FASTING STATUS PATIENT QL REPORTED: YES
GFR SERPL CREATININE-BSD FRML MDRD: >90 ML/MIN/1.73M2
GLUCOSE BLD-MCNC: 97 MG/DL (ref 70–99)
HDLC SERPL-MCNC: 53 MG/DL
LDLC SERPL CALC-MCNC: 104 MG/DL
NONHDLC SERPL-MCNC: 124 MG/DL
POTASSIUM BLD-SCNC: 3.9 MMOL/L (ref 3.4–5.3)
PROT SERPL-MCNC: 8.6 G/DL (ref 6.8–8.8)
SODIUM SERPL-SCNC: 139 MMOL/L (ref 133–144)
TRIGL SERPL-MCNC: 99 MG/DL
TSH SERPL DL<=0.005 MIU/L-ACNC: 1.44 MU/L (ref 0.4–4)

## 2022-04-08 PROCEDURE — 80053 COMPREHEN METABOLIC PANEL: CPT | Performed by: PHYSICIAN ASSISTANT

## 2022-04-08 PROCEDURE — 87389 HIV-1 AG W/HIV-1&-2 AB AG IA: CPT | Performed by: PHYSICIAN ASSISTANT

## 2022-04-08 PROCEDURE — 99214 OFFICE O/P EST MOD 30 MIN: CPT | Mod: 25 | Performed by: PHYSICIAN ASSISTANT

## 2022-04-08 PROCEDURE — 36415 COLL VENOUS BLD VENIPUNCTURE: CPT | Performed by: PHYSICIAN ASSISTANT

## 2022-04-08 PROCEDURE — 80061 LIPID PANEL: CPT | Performed by: PHYSICIAN ASSISTANT

## 2022-04-08 PROCEDURE — 86803 HEPATITIS C AB TEST: CPT | Performed by: PHYSICIAN ASSISTANT

## 2022-04-08 PROCEDURE — 99396 PREV VISIT EST AGE 40-64: CPT | Performed by: PHYSICIAN ASSISTANT

## 2022-04-08 PROCEDURE — 84443 ASSAY THYROID STIM HORMONE: CPT | Performed by: PHYSICIAN ASSISTANT

## 2022-04-08 RX ORDER — FLUTICASONE PROPIONATE 50 MCG
SPRAY, SUSPENSION (ML) NASAL
Qty: 48 G | Refills: 1 | Status: SHIPPED | OUTPATIENT
Start: 2022-04-08 | End: 2023-04-15

## 2022-04-08 RX ORDER — LOSARTAN POTASSIUM 50 MG/1
50 TABLET ORAL DAILY
Qty: 90 TABLET | Refills: 3 | Status: SHIPPED | OUTPATIENT
Start: 2022-04-08 | End: 2023-03-24

## 2022-04-08 RX ORDER — FAMOTIDINE 20 MG/1
20 TABLET, FILM COATED ORAL 2 TIMES DAILY
Qty: 180 TABLET | Refills: 3 | Status: SHIPPED | OUTPATIENT
Start: 2022-04-08 | End: 2023-03-24

## 2022-04-08 ASSESSMENT — PAIN SCALES - GENERAL: PAINLEVEL: NO PAIN (0)

## 2022-04-08 NOTE — PATIENT INSTRUCTIONS
To schedule the mammogram and pelvic ultrasound, call [phone]      Preventive Health Recommendations  Female Ages 40 to 49    Yearly exam:     See your health care provider every year in order to  1. Review health changes.   2. Discuss preventive care.    3. Review your medicines if your doctor prescribed any.      Get a Pap test every three years (unless you have an abnormal result and your provider advises testing more often).      If you get Pap tests with HPV test, you only need to test every 5 years, unless you have an abnormal result. You do not need a Pap test if your uterus was removed (hysterectomy) and you have not had cancer.      You should be tested each year for STDs (sexually transmitted diseases), if you're at risk.     Ask your doctor if you should have a mammogram.      Have a colonoscopy (test for colon cancer) if someone in your family has had colon cancer or polyps before age 50.       Have a cholesterol test every 5 years.       Have a diabetes test (fasting glucose) after age 45. If you are at risk for diabetes, you should have this test every 3 years.    Shots: Get a flu shot each year. Get a tetanus shot every 10 years.     Nutrition:     Eat at least 5 servings of fruits and vegetables each day.    Eat whole-grain bread, whole-wheat pasta and brown rice instead of white grains and rice.    Get adequate Calcium and Vitamin D.      Lifestyle    Exercise at least 150 minutes a week (an average of 30 minutes a day, 5 days a week). This will help you control your weight and prevent disease.    Limit alcohol to one drink per day.    No smoking.     Wear sunscreen to prevent skin cancer.    See your dentist every six months for an exam and cleaning.

## 2022-04-08 NOTE — PROGRESS NOTES
"   SUBJECTIVE:   CC: See Roshan is an 41 year old woman who presents for preventive health visit.       Patient has been advised of split billing requirements and indicates understanding: Yes  Healthy Habits:     Getting at least 3 servings of Calcium per day:  Yes    Bi-annual eye exam:  NO    Dental care twice a year:  NO    Sleep apnea or symptoms of sleep apnea:  Excessive snoring and Sleep apnea    Diet:  Regular (no restrictions)    Frequency of exercise:  4-5 days/week    Duration of exercise:  15-30 minutes    Taking medications regularly:  Yes    Medication side effects:  None    PHQ-2 Total Score: 0    Additional concerns today:  Yes      -weight loss.  Struggling with weight loss  Has been able to maintain weight previously around 205 but in the last 1-2 months has increased with no change in diet and exercise  States she is not a snacker but knows that she probably eats too much at dinner time  Rice is a staple in her dinners  Does not do dessert  No exercise regimen but trying to start doing that more  Hoping to get something that can help \"jump start\" things    -periods irregular  For the longest time did not get her period or would get it very randomly  After her   she started having it more often  Comes monthly but having spotting or bleeding sometimes before and after the actual period so feels like some months she is bleeding most of the time      Today's PHQ-2 Score:   PHQ-2 (  [employer]) 2022   Q1: Little interest or pleasure in doing things 0   Q2: Feeling down, depressed or hopeless 0   PHQ-2 Score 0   PHQ-2 Total Score (12-17 Years)- Positive if 3 or more points; Administer PHQ-A if positive -   Q1: Little interest or pleasure in doing things Not at all   Q2: Feeling down, depressed or hopeless Not at all   PHQ-2 Score 0       Abuse: Current or Past (Physical, Sexual or Emotional) - No  Do you feel safe in your environment? Yes        Social History     Tobacco Use     Smoking " status: Former Smoker     Packs/day: 0.50     Years: 10.00     Pack years: 5.00     Types: Cigarettes     Quit date: 2005     Years since quittin.2     Smokeless tobacco: Never Used   Substance Use Topics     Alcohol use: No         Alcohol Use 2022   Prescreen: >3 drinks/day or >7 drinks/week? No   Prescreen: >3 drinks/day or >7 drinks/week? -       Reviewed orders with patient.  Reviewed health maintenance and updated orders accordingly - Yes  BP Readings from Last 3 Encounters:   22 126/74   22 136/88   21 124/82    Wt Readings from Last 3 Encounters:   22 96.6 kg (213 lb)   21 93.4 kg (206 lb)   20 94.8 kg (209 lb)                    Breast Cancer Screening:    Breast CA Risk Assessment (FHS-7) 2022   Do you have a family history of breast, colon, or ovarian cancer? No / Unknown       Pertinent mammograms are reviewed under the imaging tab.    History of abnormal Pap smear: NO - age 30-65 PAP every 5 years with negative HPV co-testing recommended  PAP / HPV Latest Ref Rng & Units 2019   PAP (Historical) - NIL NIL   HPV16 NEG:Negative Negative -   HPV18 NEG:Negative Negative -   HRHPV NEG:Negative Negative -     Reviewed and updated as needed this visit by clinical staff   Tobacco  Allergies  Meds  Problems  Med Hx  Surg Hx  Fam Hx  Soc   Hx        Reviewed and updated as needed this visit by Provider      Problems                Review of Systems  CONSTITUTIONAL: NEGATIVE for fever, chills, change in weight  INTEGUMENTARU/SKIN: NEGATIVE for worrisome rashes, moles or lesions  EYES: NEGATIVE for vision changes or irritation  ENT: NEGATIVE for ear, mouth and throat problems  RESP: NEGATIVE for significant cough or SOB  BREAST: NEGATIVE for masses, tenderness or discharge  CV: NEGATIVE for chest pain, palpitations or peripheral edema  GI: NEGATIVE for nausea, abdominal pain, heartburn, or change in bowel habits  : NEGATIVE for unusual  "urinary or vaginal symptoms. Periods are regular.  MUSCULOSKELETAL: NEGATIVE for significant arthralgias or myalgia  NEURO: NEGATIVE for weakness, dizziness or paresthesias  PSYCHIATRIC: NEGATIVE for changes in mood or affect     OBJECTIVE:   /74   Pulse 81   Temp 98.2  F (36.8  C) (Tympanic)   Ht 1.499 m (4' 11\")   Wt 96.6 kg (213 lb)   SpO2 98%   BMI 43.02 kg/m    Physical Exam  GENERAL: healthy, alert and no distress  EYES: Eyes grossly normal to inspection, PERRL and conjunctivae and sclerae normal  HENT: ear canals and TM's normal, nose and mouth without ulcers or lesions  NECK: no adenopathy, no asymmetry, masses, or scars and thyroid normal to palpation  RESP: lungs clear to auscultation - no rales, rhonchi or wheezes  BREAST: normal without masses, tenderness or nipple discharge and no palpable axillary masses or adenopathy  CV: regular rate and rhythm, normal S1 S2, no S3 or S4, no murmur, click or rub, no peripheral edema and peripheral pulses strong  ABDOMEN: soft, nontender, no hepatosplenomegaly, no masses and bowel sounds normal   (female): normal female external genitalia, normal urethral meatus, vaginal mucosa pink, moist, well rugated, and normal cervix/adnexa/uterus without masses or discharge  MS: no gross musculoskeletal defects noted, no edema  SKIN: no suspicious lesions or rashes  NEURO: Normal strength and tone, mentation intact and speech normal  PSYCH: mentation appears normal, affect normal/bright    Diagnostic Test Results:  pending    ASSESSMENT/PLAN:   (Z00.00) Routine general medical examination at a health care facility  (primary encounter diagnosis)  Comment:   Plan: Lipid panel reflex to direct LDL Fasting,         Comprehensive metabolic panel (BMP + Alb, Alk         Phos, ALT, AST, Total. Bili, TP), *MA Screening        Digital Bilateral, TSH with free T4 reflex            (Z11.4) Screening for HIV (human immunodeficiency virus)  Comment:   Plan: HIV Antigen Antibody " "Combo            (Z11.59) Need for hepatitis C screening test  Comment:   Plan: Hepatitis C Screen Reflex to HCV RNA Quant and         Genotype            (K21.00) Gastroesophageal reflux disease with esophagitis without hemorrhage  Comment:   Plan: famotidine (PEPCID) 20 MG tablet            (J30.2) Seasonal allergic rhinitis, unspecified trigger  Comment:   Plan: fluticasone (FLONASE) 50 MCG/ACT nasal spray            (I10) Essential hypertension  Comment:   Plan: Comprehensive metabolic panel (BMP + Alb, Alk         Phos, ALT, AST, Total. Bili, TP), losartan         (COZAAR) 50 MG tablet            (E66.01) Overweight  Comment: discussed weight loss, medications, exercise and diet. Not a snacker but sounds like she can make some changes to her dinner - monitor portion sizes and balance the diet out. ENcouraged her to continue exercise or even just walking  Discussed medications - plan to start wegovy  Plan: Semaglutide-Weight Management 0.25 MG/0.5ML         SOAJ            (N92.6) Irregular menstrual bleeding  Comment: US today - last US in 2012, suspect based on her history of periods and the US she has PCOS. WAs on metformin years ago but did not tolerate.   Plan: US Pelvic Complete with Transvaginal                Patient has been advised of split billing requirements and indicates understanding: Yes    COUNSELING:  Reviewed preventive health counseling, as reflected in patient instructions       Regular exercise       Healthy diet/nutrition    Estimated body mass index is 43.02 kg/m  as calculated from the following:    Height as of this encounter: 1.499 m (4' 11\").    Weight as of this encounter: 96.6 kg (213 lb).    Weight management plan: Discussed healthy diet and exercise guidelines initiating medication    She reports that she quit smoking about 17 years ago. Her smoking use included cigarettes. She has a 5.00 pack-year smoking history. She has never used smokeless tobacco.      Counseling " Resources:  ATP IV Guidelines  Pooled Cohorts Equation Calculator  Breast Cancer Risk Calculator  BRCA-Related Cancer Risk Assessment: FHS-7 Tool  FRAX Risk Assessment  ICSI Preventive Guidelines  Dietary Guidelines for Americans, 2010  USDA's MyPlate  ASA Prophylaxis  Lung CA Screening    [facility]

## 2022-04-11 LAB
HCV AB SERPL QL IA: NONREACTIVE
HIV 1+2 AB+HIV1 P24 AG SERPL QL IA: NONREACTIVE

## 2022-04-29 ENCOUNTER — MYC MEDICAL ADVICE (OUTPATIENT)
Dept: FAMILY MEDICINE | Facility: CLINIC | Age: 42
End: 2022-04-29
Payer: COMMERCIAL

## 2022-04-29 DIAGNOSIS — E66.9 OBESITY (BMI 30-39.9): Primary | ICD-10-CM

## 2022-05-12 DIAGNOSIS — R06.2 WHEEZING: ICD-10-CM

## 2022-05-12 NOTE — TELEPHONE ENCOUNTER
Routing refill request to provider for review/approval because:  Drug not on the [facility] refill protocol     Shyam Zaman RN

## 2022-05-16 RX ORDER — GUAIFENESIN AND DEXTROMETHORPHAN HYDROBROMIDE 600; 30 MG/1; MG/1
1 TABLET, EXTENDED RELEASE ORAL EVERY 12 HOURS
Qty: 180 TABLET | Refills: 3 | Status: SHIPPED | OUTPATIENT
Start: 2022-05-16 | End: 2022-10-19

## 2022-06-14 ENCOUNTER — MYC MEDICAL ADVICE (OUTPATIENT)
Dept: FAMILY MEDICINE | Facility: CLINIC | Age: 42
End: 2022-06-14
Payer: COMMERCIAL

## 2022-06-14 DIAGNOSIS — E66.9 OBESITY (BMI 30-39.9): Primary | ICD-10-CM

## 2022-07-06 DIAGNOSIS — T75.3XXD MOTION SICKNESS, SUBSEQUENT ENCOUNTER: ICD-10-CM

## 2022-07-07 RX ORDER — SCOLOPAMINE TRANSDERMAL SYSTEM 1 MG/1
PATCH, EXTENDED RELEASE TRANSDERMAL
Qty: 3 PATCH | Refills: 1 | Status: SHIPPED | OUTPATIENT
Start: 2022-07-07 | End: 2022-10-19

## 2022-07-28 DIAGNOSIS — E66.9 OBESITY (BMI 30-39.9): ICD-10-CM

## 2022-07-29 RX ORDER — SEMAGLUTIDE 2.4 MG/.75ML
INJECTION, SOLUTION SUBCUTANEOUS
Qty: 3 ML | Refills: 0 | Status: SHIPPED | OUTPATIENT
Start: 2022-07-29 | End: 2022-08-26

## 2022-08-24 DIAGNOSIS — E66.9 OBESITY (BMI 30-39.9): ICD-10-CM

## 2022-08-26 RX ORDER — SEMAGLUTIDE 2.4 MG/.75ML
INJECTION, SOLUTION SUBCUTANEOUS
Qty: 3 ML | Refills: 0 | Status: SHIPPED | OUTPATIENT
Start: 2022-08-26 | End: 2022-09-22

## 2022-09-22 DIAGNOSIS — E66.9 OBESITY (BMI 30-39.9): ICD-10-CM

## 2022-09-22 RX ORDER — SEMAGLUTIDE 2.4 MG/.75ML
INJECTION, SOLUTION SUBCUTANEOUS
Qty: 3 ML | Refills: 0 | Status: SHIPPED | OUTPATIENT
Start: 2022-09-22 | End: 2022-11-10

## 2022-09-22 NOTE — TELEPHONE ENCOUNTER
Routing refill request to provider for review/approval because:  Drug not on the [facility] refill protocol   Sintia Alford RN

## 2022-10-05 ENCOUNTER — MYC MEDICAL ADVICE (OUTPATIENT)
Dept: FAMILY MEDICINE | Facility: CLINIC | Age: 42
End: 2022-10-05

## 2022-10-05 DIAGNOSIS — E66.9 OBESITY (BMI 30-39.9): Primary | ICD-10-CM

## 2022-10-07 RX ORDER — SEMAGLUTIDE 1.7 MG/.75ML
1.7 INJECTION, SOLUTION SUBCUTANEOUS
Qty: 0.75 ML | Refills: 1 | Status: SHIPPED | OUTPATIENT
Start: 2022-10-07 | End: 2023-03-24

## 2022-10-12 ENCOUNTER — TELEPHONE (OUTPATIENT)
Dept: FAMILY MEDICINE | Facility: CLINIC | Age: 42
End: 2022-10-12

## 2022-10-12 DIAGNOSIS — N92.4 EXCESSIVE BLEEDING IN PREMENOPAUSAL PERIOD: Primary | ICD-10-CM

## 2022-10-12 RX ORDER — IBUPROFEN 800 MG/1
800 TABLET, FILM COATED ORAL EVERY 8 HOURS PRN
Qty: 30 TABLET | Refills: 1 | Status: SHIPPED | OUTPATIENT
Start: 2022-10-12 | End: 2023-06-23

## 2022-10-12 NOTE — TELEPHONE ENCOUNTER
I sent in ibuprofen 800mg 3 times per day for the next few days scheduled meaning take it 3 times even if there is not a lot of cramping.  She should be seen in the next few weeks so we can determine what to do next. Marcia Clemens M.D.

## 2022-10-12 NOTE — TELEPHONE ENCOUNTER
Period started middle of night of 10/10/22.  Regular flow until last night; 10/11/22. Changing overnight pad every 45 minutes.  Lower abdominal cramping started last week; constant Pain score of 5-7.   No history of abdominal surgeries.  Denies fever.  Denies nausea/vomitting.  Urinating OK  Last BM yesterday; normal  Denies lightheaded.  First time this has happened.  Magno Forte RN

## 2022-10-12 NOTE — TELEPHONE ENCOUNTER
Patient notified of all of Dr. Clemens's instructions. Appointment made to see Nicole on 10/14/22.  Magno Forte RN

## 2022-10-14 ENCOUNTER — OFFICE VISIT (OUTPATIENT)
Dept: FAMILY MEDICINE | Facility: CLINIC | Age: 42
End: 2022-10-14
Payer: COMMERCIAL

## 2022-10-14 VITALS
BODY MASS INDEX: 40.52 KG/M2 | OXYGEN SATURATION: 98 % | DIASTOLIC BLOOD PRESSURE: 72 MMHG | HEART RATE: 110 BPM | HEIGHT: 59 IN | SYSTOLIC BLOOD PRESSURE: 126 MMHG | WEIGHT: 201 LBS

## 2022-10-14 DIAGNOSIS — N92.0 MENORRHAGIA WITH REGULAR CYCLE: Primary | ICD-10-CM

## 2022-10-14 DIAGNOSIS — E66.01 MORBID OBESITY (H): ICD-10-CM

## 2022-10-14 PROCEDURE — 99214 OFFICE O/P EST MOD 30 MIN: CPT | Performed by: PHYSICIAN ASSISTANT

## 2022-10-14 ASSESSMENT — PAIN SCALES - GENERAL: PAINLEVEL: NO PAIN (0)

## 2022-10-14 NOTE — PROGRESS NOTES
"  Assessment & Plan     (N92.0) Menorrhagia with regular cycle  (primary encounter diagnosis)  Comment: abnormal period this last month although has since finally resolved. Only 1 month with atypia - discussed getting an ultrasound (had ordered one in the past but had not completed) but assumign normal would wait to see how the next month cycle goes  Plan: US Pelvic Complete with Transvaginal            (E66.01) Morbid obesity (H)  Comment: some initial improvement with ozempic but has since hit a plateau. She is going to try decreasing back to the lower dose then increasing again as she has read that has worked for some  Plan: continue ozempic - lower dose and then titrate back up        BMI:   Estimated body mass index is 40.6 kg/m  as calculated from the following:    Height as of this encounter: 1.499 m (4' 11\").    Weight as of this encounter: 91.2 kg (201 lb).     No follow-ups on file.    [facility]   See is a 41 year old, presenting for the following health issues:  Abnormal Bleeding Problem      HPI     Answers for HPI/ROS submitted by the patient on 10/14/2022  How many servings of fruits and vegetables do you eat daily?: 2-3  On average, how many sweetened beverages do you drink each day (Examples: soda, juice, sweet tea, etc.  Do NOT count diet or artificially sweetened beverages)?: 0  How many minutes a day do you exercise enough to make your heart beat faster?: 9 or less  How many days a week do you exercise enough to make your heart beat faster?: 3 or less  How many days per week do you miss taking your medication?: 0  What is the reason for your visit today?: heavy period and weight loss  When did your symptoms begin?: 1-3 days ago    -She is no longer having the heavy bleeding.     -She wanted to follow up with weight loss.       Normally 3-7 days, usually more like 7 but very light - only needing a panty liner  Would never bleed " "overnight    This last month she had a very heavy period - actually woke her up from her sleep and she had blood everywhere  Was going through a pad every hour at work   Finally started slowing down and is almost stopped at this point    Review of Systems   Remainder of ROS obtained and found to be negative other than that which was documented above        Objective    /72   Pulse 110   Ht 1.499 m (4' 11\")   Wt 91.2 kg (201 lb)   SpO2 98%   BMI 40.60 kg/m    Body mass index is 40.6 kg/m .  Physical Exam   GENERAL: healthy, alert and no distress  CV: regular rates and rhythm, normal S1 S2, no S3 or S4, no murmur, click or rub and no peripheral edema  ABDOMEN: soft, nontender and bowel sounds normal              "

## 2022-11-10 ENCOUNTER — MYC REFILL (OUTPATIENT)
Dept: FAMILY MEDICINE | Facility: CLINIC | Age: 42
End: 2022-11-10

## 2022-11-10 DIAGNOSIS — E66.9 OBESITY (BMI 30-39.9): ICD-10-CM

## 2022-11-11 RX ORDER — SEMAGLUTIDE 2.4 MG/.75ML
2.4 INJECTION, SOLUTION SUBCUTANEOUS WEEKLY
Qty: 3 ML | Refills: 1 | Status: SHIPPED | OUTPATIENT
Start: 2022-11-11 | End: 2022-12-27

## 2022-11-11 NOTE — TELEPHONE ENCOUNTER
Routing to ordering provider for consideration, not on refill protocol. Pt requesting dose change          Melissa Acosta RN MSN

## 2022-12-05 DIAGNOSIS — J00 ACUTE RHINITIS: ICD-10-CM

## 2022-12-07 RX ORDER — PSEUDOEPHEDRINE HCL 30 MG/1
TABLET, FILM COATED ORAL
Qty: 200 TABLET | Refills: 1 | Status: SHIPPED | OUTPATIENT
Start: 2022-12-07 | End: 2024-03-28

## 2022-12-26 ENCOUNTER — HEALTH MAINTENANCE LETTER (OUTPATIENT)
Age: 42
End: 2022-12-26

## 2022-12-27 DIAGNOSIS — E66.9 OBESITY (BMI 30-39.9): ICD-10-CM

## 2022-12-27 RX ORDER — SEMAGLUTIDE 2.4 MG/.75ML
2.4 INJECTION, SOLUTION SUBCUTANEOUS
Qty: 3 ML | Refills: 1 | Status: SHIPPED | OUTPATIENT
Start: 2022-12-27 | End: 2023-02-17

## 2022-12-31 ENCOUNTER — E-VISIT (OUTPATIENT)
Dept: URGENT CARE | Facility: CLINIC | Age: 42
End: 2022-12-31
Payer: COMMERCIAL

## 2022-12-31 DIAGNOSIS — B96.89 ACUTE BACTERIAL SINUSITIS: Primary | ICD-10-CM

## 2022-12-31 DIAGNOSIS — J01.90 ACUTE BACTERIAL SINUSITIS: Primary | ICD-10-CM

## 2022-12-31 PROCEDURE — 99421 OL DIG E/M SVC 5-10 MIN: CPT | Performed by: EMERGENCY MEDICINE

## 2022-12-31 RX ORDER — DOXYCYCLINE HYCLATE 100 MG
100 TABLET ORAL 2 TIMES DAILY
Qty: 14 TABLET | Refills: 0 | Status: SHIPPED | OUTPATIENT
Start: 2022-12-31 | End: 2023-01-07

## 2022-12-31 NOTE — PATIENT INSTRUCTIONS
Sinusitis (Antibiotic Treatment)    The sinuses are air-filled spaces within the bones of the face. They connect to the inside of the nose. Sinusitis is an inflammation of the tissue that lines the sinuses. Sinusitis can occur during a cold. It can also happen due to allergies to pollens and other particles in the air. Sinusitis can cause symptoms of sinus congestion and a feeling of fullness. A sinus infection causes fever, headache, and facial pain. There is often green or yellow fluid draining from the nose or into the back of the throat (post-nasal drip). You have been given antibiotics to treat this condition.   Home care    Take the full course of antibiotics as instructed. Don't stop taking them, even when you feel better.    Drink plenty of water, hot tea, and other liquids as directed by the healthcare provider. This may help thin nasal mucus. It also may help your sinuses drain fluids.    Heat may help soothe painful areas of your face. Use a towel soaked in hot water. Or,  the shower and direct the warm spray onto your face. Using a vaporizer along with a menthol rub at night may also help soothe symptoms.     An expectorant with guaifenesin may help thin nasal mucus and help your sinuses drain fluids. Talk with your provider or pharmacists before taking an over-the-counter (OTC) medicine if you have any questions about it or its side effects..    You can use an OTC decongestant, unless a similar medicine was prescribed to you. Nasal sprays work the fastest. Use one that contains phenylephrine or oxymetazoline. First blow your nose gently. Then use the spray. Don't use these medicines more often than directed on the label. If you do, your symptoms may get worse. You may also take pills that contain pseudoephedrine. Don t use products that combine multiple medicines. This is because side effects may be increased. Read labels. You can also ask the pharmacist for help. (People with high blood  pressure should not use decongestants. They can raise blood pressure.) Talk with your provider or pharmacist if you have any questions about the medicine..    OTC antihistamines may help if allergies contributed to your sinusitis. Talk with your provider or pharmacist if you have any questions about the medicine..    Don't use nasal rinses or irrigation during an acute sinus infection, unless your healthcare provider tells you to. Rinsing may spread the infection to other areas in your sinuses.    Use acetaminophen or ibuprofen to control pain, unless another pain medicine was prescribed to you. If you have chronic liver or kidney disease or ever had a stomach ulcer, talk with your healthcare provider before using these medicines. Never give aspirin to anyone under age 18 who is ill with a fever. It may cause severe liver damage.    Don't smoke. This can make symptoms worse.    Follow-up care  Follow up with your healthcare provider, or as advised.   When to seek medical advice  Call your healthcare provider if any of these occur:     Facial pain or headache that gets worse    Stiff neck    Unusual drowsiness or confusion    Swelling of your forehead or eyelids    Symptoms don't go away in 10 days    Vision problems, such as blurred or double vision    Fever of 100.4 F (38 C) or higher, or as directed by your healthcare provider  Call 911  Call 911 if any of these occur:     Seizure    Trouble breathing    Feeling dizzy or faint    Fingernails, skin or lips look blue, purple , or gray  Prevention  Here are steps you can take to help prevent an infection:     Keep good hand washing habits.    Don t have close contact with people who have sore throats, colds, or other upper respiratory infections.    Don t smoke, and stay away from secondhand smoke.    Stay up to date with of your vaccines.  [employer] last reviewed this educational content on 12/1/2019 2000-2021 [employer]. All rights reserved. This  information is not intended as a substitute for professional medical care. Always follow your healthcare professional's instructions.        Dear Arturo Islas        Based on your responses and diagnosis, I have prescribed doxycycline  to treat your symptoms. I have sent this to your pharmacy.?     It is also important to stay well hydrated, get lots of rest and take over-the-counter decongestants,?tylenol?or ibuprofen if you?are able to?take those medications per your primary care provider to help relieve discomfort.?     It is important that you take?all of?your prescribed medication even if your symptoms are improving after a few doses.? Taking?all of?your medicine helps prevent the symptoms from returning.?     If your symptoms worsen, you develop severe headache, vomiting, high fever (>102), or are not improving in 7 days, please contact your primary care provider for an appointment or visit any of our convenient Walk-in Care or Urgent Care Centers to be seen which can be found on our website?here.?     Thanks again for choosing?us?as your health care partner,?   ?  Nahun La MD?

## 2023-01-30 ENCOUNTER — TELEPHONE (OUTPATIENT)
Dept: FAMILY MEDICINE | Facility: CLINIC | Age: 43
End: 2023-01-30
Payer: COMMERCIAL

## 2023-01-30 NOTE — TELEPHONE ENCOUNTER
Prior Authorization Retail Medication Request    Medication/Dose: wegovy  ICD code (if different than what is on RX):    Previously Tried and Failed:    Rationale:      Insurance Name:  Clear script  Insurance ID:  [id number]      Pharmacy Information (if different than what is on RX)  Name:  [facility]  Phone:  [phone]

## 2023-02-01 NOTE — TELEPHONE ENCOUNTER
PA Initiation    Medication: Semaglutide-Weight Management (WEGOVY) 2.4 MG/0.75ML SOAJ - Initiated  Insurance Company: "[employer] - Phone [phone] Fax [phone]  Pharmacy Filling the Rx: [facility] [place] - [address]  Filling Pharmacy Phone: [phone]  Filling Pharmacy Fax: [phone]  Start Date: 2/1/2023

## 2023-02-01 NOTE — TELEPHONE ENCOUNTER
Prior Authorization Approval    Authorization Effective Date: 2/1/2023  Authorization Expiration Date: 1/31/2024  Medication: Semaglutide-Weight Management (WEGOVY) 2.4 MG/0.75ML SOAJ - Approved  Approved Dose/Quantity: 3mL/28ds  Reference #: [id number]   Insurance Company: [employer] - Phone [phone] Fax [phone]    Which Pharmacy is filling the prescription (Not needed for infusion/clinic administered): [facility] [place] [place] - [address]  Pharmacy Notified: Yes  Patient Notified: Yes

## 2023-02-16 DIAGNOSIS — E66.9 OBESITY (BMI 30-39.9): ICD-10-CM

## 2023-02-17 RX ORDER — SEMAGLUTIDE 2.4 MG/.75ML
INJECTION, SOLUTION SUBCUTANEOUS
Qty: 3 ML | Refills: 1 | Status: SHIPPED | OUTPATIENT
Start: 2023-02-17 | End: 2023-03-24

## 2023-03-24 ENCOUNTER — OFFICE VISIT (OUTPATIENT)
Dept: FAMILY MEDICINE | Facility: CLINIC | Age: 43
End: 2023-03-24
Payer: COMMERCIAL

## 2023-03-24 VITALS
TEMPERATURE: 98.3 F | BODY MASS INDEX: 39.65 KG/M2 | WEIGHT: 196.7 LBS | HEIGHT: 59 IN | RESPIRATION RATE: 16 BRPM | DIASTOLIC BLOOD PRESSURE: 84 MMHG | SYSTOLIC BLOOD PRESSURE: 122 MMHG | OXYGEN SATURATION: 94 % | HEART RATE: 105 BPM

## 2023-03-24 DIAGNOSIS — K21.00 GASTROESOPHAGEAL REFLUX DISEASE WITH ESOPHAGITIS WITHOUT HEMORRHAGE: ICD-10-CM

## 2023-03-24 DIAGNOSIS — E66.01 MORBID OBESITY (H): ICD-10-CM

## 2023-03-24 DIAGNOSIS — I10 HYPERTENSION, UNSPECIFIED TYPE: Primary | ICD-10-CM

## 2023-03-24 DIAGNOSIS — Z13.6 CARDIOVASCULAR SCREENING; LDL GOAL LESS THAN 130: ICD-10-CM

## 2023-03-24 LAB
ANION GAP SERPL CALCULATED.3IONS-SCNC: 8 MMOL/L (ref 7–15)
BUN SERPL-MCNC: 17.9 MG/DL (ref 6–20)
CALCIUM SERPL-MCNC: 9.4 MG/DL (ref 8.6–10)
CHLORIDE SERPL-SCNC: 101 MMOL/L (ref 98–107)
CHOLEST SERPL-MCNC: 184 MG/DL
CREAT SERPL-MCNC: 0.68 MG/DL (ref 0.51–0.95)
DEPRECATED HCO3 PLAS-SCNC: 29 MMOL/L (ref 22–29)
GFR SERPL CREATININE-BSD FRML MDRD: >90 ML/MIN/1.73M2
GLUCOSE SERPL-MCNC: 92 MG/DL (ref 70–99)
HDLC SERPL-MCNC: 59 MG/DL
LDLC SERPL CALC-MCNC: 105 MG/DL
NONHDLC SERPL-MCNC: 125 MG/DL
POTASSIUM SERPL-SCNC: 4.5 MMOL/L (ref 3.4–5.3)
SODIUM SERPL-SCNC: 138 MMOL/L (ref 136–145)
TRIGL SERPL-MCNC: 99 MG/DL

## 2023-03-24 PROCEDURE — 80061 LIPID PANEL: CPT | Performed by: PHYSICIAN ASSISTANT

## 2023-03-24 PROCEDURE — 36415 COLL VENOUS BLD VENIPUNCTURE: CPT | Performed by: PHYSICIAN ASSISTANT

## 2023-03-24 PROCEDURE — 80048 BASIC METABOLIC PNL TOTAL CA: CPT | Performed by: PHYSICIAN ASSISTANT

## 2023-03-24 PROCEDURE — 99214 OFFICE O/P EST MOD 30 MIN: CPT | Performed by: PHYSICIAN ASSISTANT

## 2023-03-24 RX ORDER — LOSARTAN POTASSIUM 50 MG/1
50 TABLET ORAL DAILY
Qty: 90 TABLET | Refills: 3 | Status: SHIPPED | OUTPATIENT
Start: 2023-03-24 | End: 2023-12-29

## 2023-03-24 RX ORDER — SEMAGLUTIDE 1.7 MG/.75ML
1.7 INJECTION, SOLUTION SUBCUTANEOUS
Qty: 3 ML | Refills: 3 | Status: SHIPPED | OUTPATIENT
Start: 2023-03-24 | End: 2023-12-29

## 2023-03-24 RX ORDER — FAMOTIDINE 20 MG/1
20 TABLET, FILM COATED ORAL 2 TIMES DAILY
Qty: 180 TABLET | Refills: 3 | Status: SHIPPED | OUTPATIENT
Start: 2023-03-24 | End: 2024-07-12

## 2023-03-24 RX ORDER — SEMAGLUTIDE 2.4 MG/.75ML
INJECTION, SOLUTION SUBCUTANEOUS
Qty: 3 ML | Refills: 1 | Status: SHIPPED | OUTPATIENT
Start: 2023-03-24 | End: 2023-07-16

## 2023-03-24 ASSESSMENT — PAIN SCALES - GENERAL: PAINLEVEL: NO PAIN (0)

## 2023-03-24 NOTE — PROGRESS NOTES
"  Assessment & Plan     (I10) Hypertension, unspecified type  (primary encounter diagnosis)  Comment: well controlled today. Continue cozaar. Labs today  Plan: BASIC METABOLIC PANEL, losartan (COZAAR) 50 MG         tablet            (E66.01) Morbid obesity (H)  Comment: at a plateau - possible that she has met her max weight loss with medication however previously when she plateaued and went back down on a dose and then increased again she had a few more pounds of weight loss so will try that again. She has had more constipation issues at the higher dose as well so may tolerate the lower dose better in general  Plan: Semaglutide-Weight Management (WEGOVY) 1.7         MG/0.75ML pen, Semaglutide-Weight Management         (WEGOVY) 2.4 MG/0.75ML pen            (K21.00) Gastroesophageal reflux disease with esophagitis without hemorrhage  Comment: refilled  Plan: famotidine (PEPCID) 20 MG tablet            (Z13.6) CARDIOVASCULAR SCREENING; LDL GOAL LESS THAN 130  Comment:   Plan: Lipid panel reflex to direct LDL Non-fasting               BMI:   Estimated body mass index is 39.73 kg/m  as calculated from the following:    Height as of this encounter: 1.499 m (4' 11\").    Weight as of this encounter: 89.2 kg (196 lb 11.2 oz).           Nicole Arndt PA-C  [facility] [place] is a 42 year old, presenting for the following health issues:  Recheck Medication  No flowsheet data found.  History of Present Illness       Reason for visit:  Renewal on wegovy    She eats 0-1 servings of fruits and vegetables daily.She consumes 1 sweetened beverage(s) daily.She exercises with enough effort to increase her heart rate 10 to 19 minutes per day.  She exercises with enough effort to increase her heart rate 3 or less days per week.   She is taking medications regularly.       Seems to be stuck at current weight for the past 3-4 months  Wondering if she has reached the max weight loss potential  Has " "struggled a little more with constipation as well since going up to this dose  Normal to go daily but every 2 weeks or so she gets constipated and will need to take a senna-S that does help    Has started to exercise more - aims for  5 days/week  She knows she needs to make some diet changes but is trying to find a nutritionist that understands better her culture/diet  Has cut down a little on the amount of rice they(family) eats. Had a cousin recently also get diagnosed with diabetes so they have been trying to make some changes      Review of Systems   Remainder of ROS obtained and found to be negative other than that which was documented above        Objective    /84   Pulse 105   Temp 98.3  F (36.8  C) (Tympanic)   Resp 16   Ht 1.499 m (4' 11\")   Wt 89.2 kg (196 lb 11.2 oz)   SpO2 94%   BMI 39.73 kg/m    Body mass index is 39.73 kg/m .  Physical Exam   GENERAL: healthy, alert and no distress  EYES: Eyes grossly normal to inspection  RESP: lungs clear to auscultation - no rales, rhonchi or wheezes  CV: regular rates and rhythm, normal S1 S2, no S3 or S4 and no murmur, click or rub  SKIN: no suspicious lesions or rashes  PSYCH: mentation appears normal, affect normal/bright            "

## 2023-04-12 DIAGNOSIS — J30.2 SEASONAL ALLERGIC RHINITIS, UNSPECIFIED TRIGGER: ICD-10-CM

## 2023-04-13 NOTE — TELEPHONE ENCOUNTER
Routing refill request to provider for review/approval because:  Medication is reported/historical    Shyam Zaman RN

## 2023-04-15 RX ORDER — FLUTICASONE PROPIONATE 50 MCG
SPRAY, SUSPENSION (ML) NASAL
Qty: 48 G | Refills: 1 | Status: SHIPPED | OUTPATIENT
Start: 2023-04-15

## 2023-04-20 ENCOUNTER — PATIENT OUTREACH (OUTPATIENT)
Dept: CARE COORDINATION | Facility: CLINIC | Age: 43
End: 2023-04-20
Payer: COMMERCIAL

## 2023-06-23 DIAGNOSIS — N92.4 EXCESSIVE BLEEDING IN PREMENOPAUSAL PERIOD: ICD-10-CM

## 2023-06-23 RX ORDER — IBUPROFEN 800 MG/1
800 TABLET, FILM COATED ORAL EVERY 8 HOURS PRN
Qty: 30 TABLET | Refills: 1 | Status: SHIPPED | OUTPATIENT
Start: 2023-06-23 | End: 2023-12-29

## 2023-06-23 NOTE — TELEPHONE ENCOUNTER
Routing refill request to provider for review/approval because:  Patient needs to be seen because:  Greater than 1 year since last annual physical. Failed NSAID protocol: ALT,AST CBC greater than 12 months ago. Message sent to patient through [employer] for Patient to schedule physical.  Ganesh Mckeon RN

## 2023-07-09 ENCOUNTER — HEALTH MAINTENANCE LETTER (OUTPATIENT)
Age: 43
End: 2023-07-09

## 2023-07-12 DIAGNOSIS — E66.01 MORBID OBESITY (H): ICD-10-CM

## 2023-07-16 RX ORDER — SEMAGLUTIDE 2.4 MG/.75ML
INJECTION, SOLUTION SUBCUTANEOUS
Qty: 3 ML | Refills: 1 | Status: SHIPPED | OUTPATIENT
Start: 2023-07-16 | End: 2023-09-09

## 2023-09-07 DIAGNOSIS — E66.01 MORBID OBESITY (H): ICD-10-CM

## 2023-09-09 RX ORDER — SEMAGLUTIDE 2.4 MG/.75ML
INJECTION, SOLUTION SUBCUTANEOUS
Qty: 3 ML | Refills: 1 | Status: SHIPPED | OUTPATIENT
Start: 2023-09-09 | End: 2023-11-06

## 2023-11-28 ENCOUNTER — MYC MEDICAL ADVICE (OUTPATIENT)
Dept: FAMILY MEDICINE | Facility: CLINIC | Age: 43
End: 2023-11-28
Payer: COMMERCIAL

## 2023-11-28 DIAGNOSIS — E66.01 MORBID OBESITY (H): ICD-10-CM

## 2023-11-29 RX ORDER — SEMAGLUTIDE 2.4 MG/.75ML
INJECTION, SOLUTION SUBCUTANEOUS
Qty: 3 ML | Refills: 0 | Status: SHIPPED | OUTPATIENT
Start: 2023-11-29 | End: 2023-12-29

## 2023-12-17 ENCOUNTER — MYC MEDICAL ADVICE (OUTPATIENT)
Dept: FAMILY MEDICINE | Facility: CLINIC | Age: 43
End: 2023-12-17
Payer: COMMERCIAL

## 2023-12-17 DIAGNOSIS — E66.812 CLASS 2 SEVERE OBESITY DUE TO EXCESS CALORIES WITH SERIOUS COMORBIDITY AND BODY MASS INDEX (BMI) OF 39.0 TO 39.9 IN ADULT (H): Primary | ICD-10-CM

## 2023-12-17 DIAGNOSIS — E66.01 CLASS 2 SEVERE OBESITY DUE TO EXCESS CALORIES WITH SERIOUS COMORBIDITY AND BODY MASS INDEX (BMI) OF 39.0 TO 39.9 IN ADULT (H): Primary | ICD-10-CM

## 2023-12-29 ENCOUNTER — OFFICE VISIT (OUTPATIENT)
Dept: FAMILY MEDICINE | Facility: CLINIC | Age: 43
End: 2023-12-29
Payer: COMMERCIAL

## 2023-12-29 VITALS
BODY MASS INDEX: 40.12 KG/M2 | TEMPERATURE: 98.8 F | HEIGHT: 59 IN | DIASTOLIC BLOOD PRESSURE: 72 MMHG | HEART RATE: 92 BPM | SYSTOLIC BLOOD PRESSURE: 124 MMHG | OXYGEN SATURATION: 97 % | WEIGHT: 199 LBS

## 2023-12-29 DIAGNOSIS — E66.01 MORBID OBESITY (H): ICD-10-CM

## 2023-12-29 DIAGNOSIS — N92.4 EXCESSIVE BLEEDING IN PREMENOPAUSAL PERIOD: ICD-10-CM

## 2023-12-29 DIAGNOSIS — I10 PRIMARY HYPERTENSION: ICD-10-CM

## 2023-12-29 DIAGNOSIS — Z00.00 ROUTINE GENERAL MEDICAL EXAMINATION AT A HEALTH CARE FACILITY: Primary | ICD-10-CM

## 2023-12-29 LAB
ALBUMIN SERPL BCG-MCNC: 4.5 G/DL (ref 3.5–5.2)
ALP SERPL-CCNC: 60 U/L (ref 40–150)
ALT SERPL W P-5'-P-CCNC: 23 U/L (ref 0–50)
ANION GAP SERPL CALCULATED.3IONS-SCNC: 11 MMOL/L (ref 7–15)
AST SERPL W P-5'-P-CCNC: 37 U/L (ref 0–45)
BILIRUB SERPL-MCNC: 0.3 MG/DL
BUN SERPL-MCNC: 12.8 MG/DL (ref 6–20)
CALCIUM SERPL-MCNC: 9.3 MG/DL (ref 8.6–10)
CHLORIDE SERPL-SCNC: 103 MMOL/L (ref 98–107)
CHOLEST SERPL-MCNC: 136 MG/DL
CREAT SERPL-MCNC: 0.51 MG/DL (ref 0.51–0.95)
DEPRECATED HCO3 PLAS-SCNC: 22 MMOL/L (ref 22–29)
EGFRCR SERPLBLD CKD-EPI 2021: >90 ML/MIN/1.73M2
FASTING STATUS PATIENT QL REPORTED: YES
GLUCOSE SERPL-MCNC: 87 MG/DL (ref 70–99)
HDLC SERPL-MCNC: 46 MG/DL
LDLC SERPL CALC-MCNC: 76 MG/DL
NONHDLC SERPL-MCNC: 90 MG/DL
POTASSIUM SERPL-SCNC: 5.4 MMOL/L (ref 3.4–5.3)
PROT SERPL-MCNC: 8.1 G/DL (ref 6.4–8.3)
SODIUM SERPL-SCNC: 136 MMOL/L (ref 135–145)
TRIGL SERPL-MCNC: 70 MG/DL

## 2023-12-29 PROCEDURE — 36415 COLL VENOUS BLD VENIPUNCTURE: CPT | Performed by: PHYSICIAN ASSISTANT

## 2023-12-29 PROCEDURE — 80061 LIPID PANEL: CPT | Performed by: PHYSICIAN ASSISTANT

## 2023-12-29 PROCEDURE — 80053 COMPREHEN METABOLIC PANEL: CPT | Performed by: PHYSICIAN ASSISTANT

## 2023-12-29 PROCEDURE — 99396 PREV VISIT EST AGE 40-64: CPT | Performed by: PHYSICIAN ASSISTANT

## 2023-12-29 RX ORDER — LOSARTAN POTASSIUM 50 MG/1
50 TABLET ORAL DAILY
Qty: 90 TABLET | Refills: 3 | Status: SHIPPED | OUTPATIENT
Start: 2023-12-29

## 2023-12-29 RX ORDER — IBUPROFEN 800 MG/1
800 TABLET, FILM COATED ORAL EVERY 8 HOURS PRN
Qty: 30 TABLET | Refills: 1 | Status: SHIPPED | OUTPATIENT
Start: 2023-12-29

## 2023-12-29 ASSESSMENT — ENCOUNTER SYMPTOMS
SORE THROAT: 0
DIARRHEA: 0
CHILLS: 0
FREQUENCY: 0
MYALGIAS: 0
SHORTNESS OF BREATH: 0
HEMATOCHEZIA: 0
DIZZINESS: 0
JOINT SWELLING: 0
PARESTHESIAS: 0
WEAKNESS: 0
HEMATURIA: 0
CONSTIPATION: 0
EYE PAIN: 0
HEARTBURN: 0
HEADACHES: 0
PALPITATIONS: 0
DYSURIA: 0
COUGH: 0
NAUSEA: 0
ABDOMINAL PAIN: 0
FEVER: 0
BREAST MASS: 0
ARTHRALGIAS: 0
NERVOUS/ANXIOUS: 0

## 2023-12-29 NOTE — PROGRESS NOTES
SUBJECTIVE:   See is a 43 year old, presenting for the following:  Physical        2023     9:33 AM   Additional Questions   Roomed by SARATH Alberts       Healthy Habits:     Getting at least 3 servings of Calcium per day:  Yes    Bi-annual eye exam:  NO    Dental care twice a year:  NO    Sleep apnea or symptoms of sleep apnea:  Excessive snoring and Sleep apnea    Diet:  Regular (no restrictions)    Frequency of exercise:  None    Taking medications regularly:  Yes    Medication side effects:  None    Additional concerns today:  No    Will be following up with OB/GYN regarding her heavy menstrual bleeding and will get her PAP done at that time    At a standstill with weight loss on the wegovy - can't get below 200lb. BMI still >40. Was advised trying to go to every 10 days between doses to see if that helped    Have you ever done Advance Care Planning? (For example, a Health Directive, POLST, or a discussion with a medical provider or your loved ones about your wishes): No, advance care planning information given to patient to review.  Patient declined advance care planning discussion at this time.    Social History     Tobacco Use    Smoking status: Former     Packs/day: 0.50     Years: 10.00     Additional pack years: 0.00     Total pack years: 5.00     Types: Cigarettes     Quit date: 2005     Years since quittin.0    Smokeless tobacco: Never   Substance Use Topics    Alcohol use: No             2023     9:32 AM   Alcohol Use   Prescreen: >3 drinks/day or >7 drinks/week? No     Reviewed orders with patient.  Reviewed health maintenance and updated orders accordingly - Yes  BP Readings from Last 3 Encounters:   23 124/72   23 122/84   10/14/22 126/72    Wt Readings from Last 3 Encounters:   23 90.3 kg (199 lb)   23 89.2 kg (196 lb 11.2 oz)   10/14/22 91.2 kg (201 lb)                    Breast Cancer Screenin/7/2022     8:09 AM   Breast CA Risk Assessment  "(FHS-7)   Do you have a family history of breast, colon, or ovarian cancer? No / Unknown           Pertinent mammograms are reviewed under the imaging tab.    History of abnormal Pap smear: NO - age 30-65 PAP every 5 years with negative HPV co-testing recommended      Latest Ref Rng & Units 1/18/2019     9:25 AM 1/18/2019     8:45 AM 4/20/2011    12:00 AM   PAP / HPV   PAP (Historical)  NIL   NIL    HPV 16 DNA NEG^Negative  Negative     HPV 18 DNA NEG^Negative  Negative     Other HR HPV NEG^Negative  Negative       Reviewed and updated as needed this visit by clinical staff    Allergies  Meds  Problems             Reviewed and updated as needed this visit by Provider     Meds  Problems                Review of Systems   Constitutional:  Negative for chills and fever.   HENT:  Negative for congestion, ear pain, hearing loss and sore throat.    Eyes:  Positive for visual disturbance. Negative for pain.   Respiratory:  Negative for cough and shortness of breath.    Cardiovascular:  Negative for chest pain, palpitations and peripheral edema.   Gastrointestinal:  Negative for abdominal pain, constipation, diarrhea, heartburn, hematochezia and nausea.   Breasts:  Negative for tenderness, breast mass and discharge.   Genitourinary:  Negative for dysuria, frequency, genital sores, hematuria, pelvic pain, urgency, vaginal bleeding and vaginal discharge.   Musculoskeletal:  Negative for arthralgias, joint swelling and myalgias.   Skin:  Negative for rash.   Neurological:  Negative for dizziness, weakness, headaches and paresthesias.   Psychiatric/Behavioral:  Negative for mood changes. The patient is not nervous/anxious.           OBJECTIVE:   /72   Pulse 92   Temp 98.8  F (37.1  C) (Tympanic)   Ht 1.499 m (4' 11\")   Wt 90.3 kg (199 lb)   SpO2 97%   BMI 40.19 kg/m    Physical Exam  GENERAL: healthy, alert and no distress  EYES: Eyes grossly normal to inspection, PERRL and conjunctivae and sclerae " "normal  HENT: ear canals and TM's normal, nose and mouth without ulcers or lesions  NECK: no adenopathy, no asymmetry, masses, or scars and thyroid normal to palpation  RESP: lungs clear to auscultation - no rales, rhonchi or wheezes  CV: regular rate and rhythm, normal S1 S2, no S3 or S4, no murmur, click or rub, no peripheral edema and peripheral pulses strong  ABDOMEN: soft, nontender, no hepatosplenomegaly, no masses and bowel sounds normal  MS: no gross musculoskeletal defects noted, no edema  SKIN: no suspicious lesions or rashes  NEURO: Normal strength and tone, mentation intact and speech normal  PSYCH: mentation appears normal, affect normal/bright    Diagnostic Test Results:  pending    ASSESSMENT/PLAN:   (Z00.00) Routine general medical examination at a health care facility  (primary encounter diagnosis)  Comment:   Plan: MA Screen Bilateral w/Fran, Lipid panel reflex         to direct LDL Fasting, Comprehensive metabolic         panel (BMP + Alb, Alk Phos, ALT, AST, Total.         Bili, TP)            (E66.01) Morbid obesity (H)  Comment:   Plan: just got 3 months of the 2.4mg wegovy. She will continue this but stretch to every 10 days. Discussed zepbound if insurance would cover given better weight loss noted. She will re assess in 3 months     (I10) Primary hypertension  Comment:   Plan: well controlled - continue losartan    (N92.4) Excessive bleeding in premenopausal period  Comment:   Plan: ibuprofen (ADVIL/MOTRIN) 800 MG tablet        Follow up with OB/GYN        Patient has been advised of split billing requirements and indicates understanding: Yes      COUNSELING:  Reviewed preventive health counseling, as reflected in patient instructions      BMI:   Estimated body mass index is 40.19 kg/m  as calculated from the following:    Height as of this encounter: 1.499 m (4' 11\").    Weight as of this encounter: 90.3 kg (199 lb).   Weight management plan: see above,. Continue wegovy      She reports " that she quit smoking about 19 years ago. Her smoking use included cigarettes. She has a 5 pack-year smoking history. She has never used smokeless tobacco.        [facility]

## 2024-02-04 ENCOUNTER — HEALTH MAINTENANCE LETTER (OUTPATIENT)
Age: 44
End: 2024-02-04

## 2024-02-26 NOTE — PROGRESS NOTES
Chief Complaint   Patient presents with    Consult     Heavy bleeding. Normal cycle every month about 5-7 days long with 2 days being very heavy         HPI:  See Roshan, 43 year old,  presents with complaints of heavy menstrual bleeding which can be irregular and her annual exam.      Past Medical History:   Diagnosis Date    Hypertension      Past Surgical History:   Procedure Laterality Date    ESOPHAGOSCOPY, GASTROSCOPY, DUODENOSCOPY (EGD), COMBINED N/A 2015    Procedure: COMBINED ESOPHAGOSCOPY, GASTROSCOPY, DUODENOSCOPY (EGD), BIOPSY SINGLE OR MULTIPLE;  Surgeon: Keagan Lozano MD;  Location: [facility]    INJECT EPIDURAL CAUDAL  2012    Procedure: INJECT EPIDURAL CAUDAL;  ANUEL Caudal--;  Surgeon: Provider, Generic Anesthesia;  Location: [facility]    Villanueva teeth pulled one       Social History     Socioeconomic History    Marital status:      Spouse name: Candy Islas    Number of children: 0    Years of education: 12+    Highest education level: Not on file   Occupational History     Employer: Giving Assistant     Comment: [employer]   Tobacco Use    Smoking status: Former     Packs/day: 0.50     Years: 10.00     Additional pack years: 0.00     Total pack years: 5.00     Types: Cigarettes     Quit date: 2005     Years since quittin.1     Passive exposure: Never    Smokeless tobacco: Never   Vaping Use    Vaping Use: Never used   Substance and Sexual Activity    Alcohol use: No    Drug use: No    Sexual activity: Not Currently     Partners: Male     Birth control/protection: None   Other Topics Concern    Parent/sibling w/ CABG, MI or angioplasty before 65F 55M? No     Service No    Blood Transfusions No    Caffeine Concern Yes     Comment: mt dew 1 a week    Occupational Exposure No    Hobby Hazards No    Sleep Concern No    Stress Concern No    Weight Concern Yes    Special Diet Yes     Comment: one a day and probiotic    Back Care No    Exercise No    Bike Helmet No     Seat Belt Yes    Self-Exams Not Asked   Social History Narrative    Not on file     Social Determinants of Health     Financial Resource Strain: Low Risk  (12/29/2023)    Financial Resource Strain     Within the past 12 months, have you or your family members you live with been unable to get utilities (heat, electricity) when it was really needed?: No   Food Insecurity: Low Risk  (12/29/2023)    Food Insecurity     Within the past 12 months, did you worry that your food would run out before you got money to buy more?: No     Within the past 12 months, did the food you bought just not last and you didn t have money to get more?: No   Transportation Needs: Low Risk  (12/29/2023)    Transportation Needs     Within the past 12 months, has lack of transportation kept you from medical appointments, getting your medicines, non-medical meetings or appointments, work, or from getting things that you need?: No   Physical Activity: Not on file   Stress: Not on file   Social Connections: Not on file   Interpersonal Safety: Not on file   Housing Stability: Low Risk  (12/29/2023)    Housing Stability     Do you have housing? : Yes     Are you worried about losing your housing?: No     Family History   Problem Relation Age of Onset    Diabetes Mother     Hypertension Mother     Arthritis Mother     Cardiovascular Father     Hypertension Father     Hypertension Brother     Hypertension Brother     Hypertension Brother     Hypertension Brother     Hypertension Sister     Hypertension Sister     Hypertension Brother     Hypertension Brother         Current Outpatient Medications   Medication Sig Dispense Refill    famotidine (PEPCID) 20 MG tablet Take 1 tablet (20 mg) by mouth 2 times daily 180 tablet 3    fluticasone (FLONASE) 50 MCG/ACT nasal spray USE 1-2 SPRAYS INTO BOTH NOSTRILS ONCE DAILY 48 g 1    ibuprofen (ADVIL/MOTRIN) 800 MG tablet Take 1 tablet (800 mg) by mouth every 8 hours as needed for moderate pain 30 tablet 1     "ketotifen (ZADITOR) 0.025 % ophthalmic solution PLACE 1 DROP INTO BOTH EYES TWO TIMES A DAY 5 mL 11    levocetirizine (XYZAL) 5 MG tablet Take 1 tablet (5 mg) by mouth every evening 90 tablet 3    losartan (COZAAR) 50 MG tablet Take 1 tablet (50 mg) by mouth daily 90 tablet 3    Semaglutide-Weight Management (WEGOVY) 2.4 MG/0.75ML pen Inject 2.4 mg Subcutaneous once a week 9 mL 3    SUDOGEST 30 MG tablet TAKE 1-2 TABLETS (30-60 MG) BY MOUTH EVERY 4 HOURS AS NEEDED FOR CONGESTION 200 tablet 1    hydrOXYzine (ATARAX) 10 MG tablet Take 1 tablet (10 mg) by mouth every 8 hours as needed for itching or anxiety (Patient not taking: Reported on 2/27/2024) 90 tablet 1    hydrOXYzine (ATARAX) 25 MG tablet Take 1-2 tablets (25-50 mg) by mouth every 6 hours as needed for itching or anxiety (Patient not taking: Reported on 2/27/2024) 60 tablet 11        Allergies:     Allergies   Allergen Reactions    Amoxicillin Hives        ROS:  See HPI      Physical Exam:  /87 (BP Location: Left arm, Patient Position: Chair, Cuff Size: Adult Regular)   Pulse 89   Temp 97.5  F (36.4  C) (Tympanic)   Resp 16   Ht 1.499 m (4' 11\")   Wt 89 kg (196 lb 3.2 oz)   LMP 02/05/2024   BMI 39.63 kg/m       Appearance: well developed, well nourished, no acute distress  Head Exam normocephalic, no lesions or deformities  Abdomen: soft, non-tender, no masses, no organomegaly, no hernia,  Skin: no lesions  Extremities: no edema  Psych: orientated x3    Genitourinary Exam  Vulva: normal, no lesions  Urethral meatus: normal size and location, no lesions or discharge  Urethra: no tenderness or masses  Bladder: no fullness or tenderness  Vagina: no cystocele and rectocele  Cervix: normal appearance, no lesions, no discharge, no cervical motion tenderness  Uterus: normal position, mobile, non-tender, size 10 weeks, irregular  Adnexa: no palpable masses bilaterally    Assessment/Plan:  Encounter Diagnoses   Name Primary?    Encounter for routine " gynecological examination Yes    Irregular uterine bleeding      Plan... Pelvic ultrasound scheduled and follow up for possible endometrial biopsy

## 2024-02-27 ENCOUNTER — OFFICE VISIT (OUTPATIENT)
Dept: OBGYN | Facility: CLINIC | Age: 44
End: 2024-02-27
Payer: COMMERCIAL

## 2024-02-27 VITALS
SYSTOLIC BLOOD PRESSURE: 126 MMHG | BODY MASS INDEX: 39.55 KG/M2 | HEART RATE: 89 BPM | HEIGHT: 59 IN | DIASTOLIC BLOOD PRESSURE: 87 MMHG | WEIGHT: 196.2 LBS | TEMPERATURE: 97.5 F | RESPIRATION RATE: 16 BRPM

## 2024-02-27 DIAGNOSIS — N92.6 IRREGULAR UTERINE BLEEDING: ICD-10-CM

## 2024-02-27 DIAGNOSIS — Z01.419 ENCOUNTER FOR ROUTINE GYNECOLOGICAL EXAMINATION: Primary | ICD-10-CM

## 2024-02-27 PROCEDURE — G0145 SCR C/V CYTO,THINLAYER,RESCR: HCPCS | Performed by: OBSTETRICS & GYNECOLOGY

## 2024-02-27 PROCEDURE — 87624 HPV HI-RISK TYP POOLED RSLT: CPT | Performed by: OBSTETRICS & GYNECOLOGY

## 2024-02-27 NOTE — PROGRESS NOTES
"Initial /87 (BP Location: Left arm, Patient Position: Chair, Cuff Size: Adult Regular)   Pulse 89   Temp 97.5  F (36.4  C) (Tympanic)   Resp 16   Ht 1.499 m (4' 11\")   Wt 89 kg (196 lb 3.2 oz)   LMP 02/05/2024   BMI 39.63 kg/m   Estimated body mass index is 39.63 kg/m  as calculated from the following:    Height as of this encounter: 1.499 m (4' 11\").    Weight as of this encounter: 89 kg (196 lb 3.2 oz). .    "

## 2024-02-27 NOTE — NURSING NOTE
"Initial /87 (BP Location: Left arm, Patient Position: Chair, Cuff Size: Adult Regular)   Pulse 89   Temp 97.5  F (36.4  C) (Tympanic)   Resp 16   Ht 1.499 m (4' 11\")   Wt 89 kg (196 lb 3.2 oz)   LMP 02/05/2024   BMI 39.63 kg/m   Estimated body mass index is 39.63 kg/m  as calculated from the following:    Height as of this encounter: 1.499 m (4' 11\").    Weight as of this encounter: 89 kg (196 lb 3.2 oz). .    Marisabel Ewing, TEMI    "

## 2024-03-01 LAB
BKR LAB AP GYN ADEQUACY: NORMAL
BKR LAB AP GYN INTERPRETATION: NORMAL
BKR LAB AP HPV REFLEX: NORMAL
BKR LAB AP PREVIOUS ABNORMAL: NORMAL
PATH REPORT.COMMENTS IMP SPEC: NORMAL
PATH REPORT.COMMENTS IMP SPEC: NORMAL
PATH REPORT.RELEVANT HX SPEC: NORMAL

## 2024-03-04 LAB
HUMAN PAPILLOMA VIRUS 16 DNA: NEGATIVE
HUMAN PAPILLOMA VIRUS 18 DNA: NEGATIVE
HUMAN PAPILLOMA VIRUS FINAL DIAGNOSIS: NORMAL
HUMAN PAPILLOMA VIRUS OTHER HR: NEGATIVE

## 2024-03-19 ENCOUNTER — MYC MEDICAL ADVICE (OUTPATIENT)
Dept: FAMILY MEDICINE | Facility: CLINIC | Age: 44
End: 2024-03-19
Payer: COMMERCIAL

## 2024-03-19 DIAGNOSIS — E66.01 MORBID OBESITY (H): Primary | ICD-10-CM

## 2024-03-25 ENCOUNTER — HOSPITAL ENCOUNTER (OUTPATIENT)
Dept: ULTRASOUND IMAGING | Facility: CLINIC | Age: 44
Discharge: HOME | End: 2024-03-25
Attending: OBSTETRICS & GYNECOLOGY
Payer: COMMERCIAL

## 2024-03-25 ENCOUNTER — HOSPITAL ENCOUNTER (OUTPATIENT)
Dept: MAMMOGRAPHY | Facility: CLINIC | Age: 44
Discharge: HOME | End: 2024-03-25
Attending: PHYSICIAN ASSISTANT
Payer: COMMERCIAL

## 2024-03-25 DIAGNOSIS — N92.6 IRREGULAR UTERINE BLEEDING: ICD-10-CM

## 2024-03-25 DIAGNOSIS — Z12.31 VISIT FOR SCREENING MAMMOGRAM: ICD-10-CM

## 2024-03-25 PROCEDURE — 76856 US EXAM PELVIC COMPLETE: CPT

## 2024-03-25 PROCEDURE — 77067 SCR MAMMO BI INCL CAD: CPT

## 2024-03-25 PROCEDURE — 76830 TRANSVAGINAL US NON-OB: CPT

## 2024-03-26 ENCOUNTER — OFFICE VISIT (OUTPATIENT)
Dept: OBGYN | Facility: CLINIC | Age: 44
End: 2024-03-26
Payer: COMMERCIAL

## 2024-03-26 VITALS
HEART RATE: 103 BPM | RESPIRATION RATE: 18 BRPM | HEIGHT: 59 IN | DIASTOLIC BLOOD PRESSURE: 91 MMHG | TEMPERATURE: 97.4 F | SYSTOLIC BLOOD PRESSURE: 141 MMHG | BODY MASS INDEX: 39.88 KG/M2 | WEIGHT: 197.8 LBS

## 2024-03-26 DIAGNOSIS — N93.9 ABNORMAL UTERINE BLEEDING (AUB): Primary | ICD-10-CM

## 2024-03-26 DIAGNOSIS — E28.2 PCOS (POLYCYSTIC OVARIAN SYNDROME): ICD-10-CM

## 2024-03-26 LAB
HBA1C MFR BLD: 5.5 % (ref 0–5.6)
HCG UR QL: NEGATIVE
INTERNAL QC OK POCT: NORMAL
POCT KIT EXPIRATION DATE: NORMAL
POCT KIT LOT NUMBER: NORMAL

## 2024-03-26 PROCEDURE — 88305 TISSUE EXAM BY PATHOLOGIST: CPT | Performed by: PATHOLOGY

## 2024-03-26 PROCEDURE — 99459 PELVIC EXAMINATION: CPT | Performed by: OBSTETRICS & GYNECOLOGY

## 2024-03-26 PROCEDURE — 36415 COLL VENOUS BLD VENIPUNCTURE: CPT | Performed by: OBSTETRICS & GYNECOLOGY

## 2024-03-26 PROCEDURE — 81025 URINE PREGNANCY TEST: CPT | Performed by: OBSTETRICS & GYNECOLOGY

## 2024-03-26 PROCEDURE — 83036 HEMOGLOBIN GLYCOSYLATED A1C: CPT | Performed by: OBSTETRICS & GYNECOLOGY

## 2024-03-26 PROCEDURE — 99214 OFFICE O/P EST MOD 30 MIN: CPT | Mod: 25 | Performed by: OBSTETRICS & GYNECOLOGY

## 2024-03-26 PROCEDURE — 58100 BIOPSY OF UTERUS LINING: CPT | Performed by: OBSTETRICS & GYNECOLOGY

## 2024-03-26 RX ORDER — NORETHINDRONE ACETATE 5 MG
5 TABLET ORAL DAILY
Qty: 90 TABLET | Refills: 3 | Status: SHIPPED | OUTPATIENT
Start: 2024-03-26

## 2024-03-26 NOTE — NURSING NOTE
"Initial BP (!) 141/91 (BP Location: Right arm, Patient Position: Sitting, Cuff Size: Adult Large)   Pulse 103   Temp 97.4  F (36.3  C) (Tympanic)   Resp 18   Ht 1.499 m (4' 11.02\")   Wt 89.7 kg (197 lb 12.8 oz)   LMP 02/05/2024   BMI 39.93 kg/m   Estimated body mass index is 39.93 kg/m  as calculated from the following:    Height as of this encounter: 1.499 m (4' 11.02\").    Weight as of this encounter: 89.7 kg (197 lb 12.8 oz). .    "

## 2024-03-26 NOTE — PROGRESS NOTES
[facility]    Gynecology Office Note    CC:   Chief Complaint   Patient presents with    Follow Up        HPI: See Roshan is a 43 year old  who presents for abnormal uterine bleeding.  Patient has a long history of irregular menstrual cycles.  Does have a previous established diagnosis of PCOS.  For many years, was having very irregular and infrequent menstrual cycles.  Struggled with infertility and underwent infertility treatment with Clomid and IUI unsuccessfully over a number of years.  After her  passed away in , she started to have more regular, monthly menses.  Cycles now are about 40 days in length, menses lasting 5 to 7 days.  Over the last year and a half, she has started to have occasional, extremely heavy periods.  She bleeds through menstrual coverage in less than an hour.  Needs to wear briefs at night so she does not soil the sheets. This has happened about six times since 2022. Reports cramping with the heavy bleeding.       GYN Hx:       Patient's last menstrual period was 2024.    History of PCOS.  Not currently sexually active.    Last Pap Smear: 2024 NIL, HPV negative      ROS: A 10 pt ROS was completed and found to be otherwise negative unless mentioned in the HPI.     PMH:   Past Medical History:   Diagnosis Date    Hypertension        PSHx:   Past Surgical History:   Procedure Laterality Date    ESOPHAGOSCOPY, GASTROSCOPY, DUODENOSCOPY (EGD), COMBINED N/A 2015    Procedure: COMBINED ESOPHAGOSCOPY, GASTROSCOPY, DUODENOSCOPY (EGD), BIOPSY SINGLE OR MULTIPLE;  Surgeon: Keagan Lozano MD;  Location: [facility]    INJECT EPIDURAL CAUDAL  2012    Procedure: INJECT EPIDURAL CAUDAL;  ANUEL Caudal--;  Surgeon: Provider, Generic Anesthesia;  Location: [facility]    [place] teeth pulled one         OBHx:   OB History    Para Term  AB Living   0 0 0 0 0 0   SAB IAB Ectopic Multiple Live Births   0 0 0 0 0        Medications:   famotidine (PEPCID) 20 MG tablet, Take 1 tablet (20 mg) by mouth 2 times daily  fluticasone (FLONASE) 50 MCG/ACT nasal spray, USE 1-2 SPRAYS INTO BOTH NOSTRILS ONCE DAILY  ibuprofen (ADVIL/MOTRIN) 800 MG tablet, Take 1 tablet (800 mg) by mouth every 8 hours as needed for moderate pain  ketotifen (ZADITOR) 0.025 % ophthalmic solution, PLACE 1 DROP INTO BOTH EYES TWO TIMES A DAY  levocetirizine (XYZAL) 5 MG tablet, Take 1 tablet (5 mg) by mouth every evening  losartan (COZAAR) 50 MG tablet, Take 1 tablet (50 mg) by mouth daily  Semaglutide-Weight Management (WEGOVY) 2.4 MG/0.75ML pen, Inject 2.4 mg Subcutaneous once a week  SUDOGEST 30 MG tablet, TAKE 1-2 TABLETS (30-60 MG) BY MOUTH EVERY 4 HOURS AS NEEDED FOR CONGESTION  hydrOXYzine (ATARAX) 10 MG tablet, Take 1 tablet (10 mg) by mouth every 8 hours as needed for itching or anxiety (Patient not taking: Reported on 2024)  hydrOXYzine (ATARAX) 25 MG tablet, Take 1-2 tablets (25-50 mg) by mouth every 6 hours as needed for itching or anxiety (Patient not taking: Reported on 2024)    No current facility-administered medications on file prior to visit.      Allergies:      Allergies   Allergen Reactions    Amoxicillin Hives       Social History:   Social History     Socioeconomic History    Marital status:      Spouse name: Candy Islas    Number of children: 0    Years of education: 12+    Highest education level: Not on file   Occupational History     Employer: [employer]     Comment: [employer]   Tobacco Use    Smoking status: Former     Packs/day: 0.50     Years: 10.00     Additional pack years: 0.00     Total pack years: 5.00     Types: Cigarettes     Quit date: 2005     Years since quittin.2     Passive exposure: Never    Smokeless tobacco: Never   Vaping Use    Vaping Use: Never used   Substance and Sexual Activity    Alcohol use: No    Drug use: No    Sexual activity: Not Currently     Partners: Male     Birth  control/protection: None   Other Topics Concern    Parent/sibling w/ CABG, MI or angioplasty before 65F 55M? No     Service No    Blood Transfusions No    Caffeine Concern Yes     Comment: mt dew 1 a week    Occupational Exposure No    Hobby Hazards No    Sleep Concern No    Stress Concern No    Weight Concern Yes    Special Diet Yes     Comment: one a day and probiotic    Back Care No    Exercise No    Bike Helmet No    Seat Belt Yes    Self-Exams Not Asked   Social History Narrative    Not on file     Social Determinants of Health     Financial Resource Strain: Low Risk  (12/29/2023)    Financial Resource Strain     Within the past 12 months, have you or your family members you live with been unable to get utilities (heat, electricity) when it was really needed?: No   Food Insecurity: Low Risk  (12/29/2023)    Food Insecurity     Within the past 12 months, did you worry that your food would run out before you got money to buy more?: No     Within the past 12 months, did the food you bought just not last and you didn t have money to get more?: No   Transportation Needs: Low Risk  (12/29/2023)    Transportation Needs     Within the past 12 months, has lack of transportation kept you from medical appointments, getting your medicines, non-medical meetings or appointments, work, or from getting things that you need?: No   Physical Activity: Not on file   Stress: Not on file   Social Connections: Not on file   Interpersonal Safety: Not on file   Housing Stability: Low Risk  (12/29/2023)    Housing Stability     Do you have housing? : Yes     Are you worried about losing your housing?: No         Family History:   Family History   Problem Relation Age of Onset    Diabetes Mother     Hypertension Mother     Arthritis Mother     Cardiovascular Father     Hypertension Father     Hypertension Brother     Hypertension Brother     Hypertension Brother     Hypertension Brother     Hypertension Sister     Hypertension  "Sister     Hypertension Brother     Hypertension Brother        Physical Exam:   Vitals:    03/26/24 1100   BP: (!) 141/91   BP Location: Right arm   Patient Position: Sitting   Cuff Size: Adult Large   Pulse: 103   Resp: 18   Temp: 97.4  F (36.3  C)   TempSrc: Tympanic   Weight: 89.7 kg (197 lb 12.8 oz)   Height: 1.499 m (4' 11.02\")      Estimated body mass index is 39.93 kg/m  as calculated from the following:    Height as of this encounter: 1.499 m (4' 11.02\").    Weight as of this encounter: 89.7 kg (197 lb 12.8 oz).    General appearance: well-hydrated, A&O x 3, no apparent distress  Lungs: Equal expansion bilaterally, no accessory muscle use  Heart: No heaves or thrills.   Constitutional: See vitals  Neuro: CN II-XII grossly intact  Genitourinary:  External genitalia: no erythema, no lesions.   Anus and Perineum: Unremarkable, no visible lesions  Vagina: Normal, healthy pink mucosa without any lesions. Physiologic vaginal discharge.   Cervix: normal appearance, no cervical motion tenderness.     Labs/Imaging: Pelvic US 3/25/24 with normal sized uterus, single small intramural fibroid measuring up to 1.6cm, normal smooth endometrium 5mm. Ovaries normal.      Endometrial Biopsy Procedure Note      See Roshan  1980  [id number]    Indications:    See Roshan is a 43 year old year old female, who is having an endometrial biopsy for abnormal uterine bleeding.    Procedure:  Is a pregnancy test required: Yes.  Was it positive or negative?  Negative    Prior to the start of the procedure, written and verbal consent was obtained from the patient. The patient was then placed in the dorsal lithotomy position.  A speculum was placed in the vagina and the cervix visualized. The cervix was cleaned with betadine swabs x3. A tenaculum was placed on the cervix. A small plastic 5 mm Pipelle syringe curette was passed through the cervix to the fundus with return of moderate amount of tissue. This was placed in specimen jar and " sent for permanent pathology. All instruments were removed.      The patient tolerated the procedure well.    Post Procedure:    PLAN : Await the results of the biopsy. There were no complications. Patient was discharged in stable condition.    The patient was given post op instructions which included activity and pelvic restrictions.  She was advised to call the clinic if excessive bleeding, pelvic pain, or fever.     Follow-up based on results.         Assessment and Plan:     Encounter Diagnoses   Name Primary?    Abnormal uterine bleeding (AUB) Yes    PCOS (polycystic ovarian syndrome)      Patient presents for follow up regarding her history of abnormal uterine bleeding.  She has a known history of PCOS.  Reviewed this diagnosis including pathophysiology and associated comorbidities.  Also reviewed further workup and evaluation for her abnormal bleeding.  Endometrial biopsy was obtained today.  Also recommend evaluation of hemoglobin A1c, which was ordered.  Her lipid testing are up-to-date and normal.    Discussed management options for her AUB including medical and surgical intervention options.  Patient is interested in pursuing medical options.  Discussed oral hormonal therapies as well as IUD.  After discussion of options, decision made to trial Aygestin for control of bleeding, Rx sent. Discussed anticipated bleeding profile. Plan follow up in 3 months if no improvement of symptoms.     Health maintenance: pap smear up to date and normal, mammogram UTD    Return to clinic annually and as needed. Will follow up sooner if needed or if abnormal EMB.    Angelica Pena DO

## 2024-03-28 ENCOUNTER — MYC REFILL (OUTPATIENT)
Dept: FAMILY MEDICINE | Facility: CLINIC | Age: 44
End: 2024-03-28
Payer: COMMERCIAL

## 2024-03-28 DIAGNOSIS — J00 ACUTE RHINITIS: ICD-10-CM

## 2024-03-28 LAB
PATH REPORT.COMMENTS IMP SPEC: NORMAL
PATH REPORT.COMMENTS IMP SPEC: NORMAL
PATH REPORT.FINAL DX SPEC: NORMAL
PATH REPORT.GROSS SPEC: NORMAL
PATH REPORT.MICROSCOPIC SPEC OTHER STN: NORMAL
PATH REPORT.RELEVANT HX SPEC: NORMAL
PHOTO IMAGE: NORMAL

## 2024-03-29 RX ORDER — PSEUDOEPHEDRINE HCL 30 MG
TABLET ORAL
Qty: 200 TABLET | Refills: 1 | Status: SHIPPED | OUTPATIENT
Start: 2024-03-29

## 2024-04-02 ENCOUNTER — TELEPHONE (OUTPATIENT)
Dept: FAMILY MEDICINE | Facility: CLINIC | Age: 44
End: 2024-04-02
Payer: COMMERCIAL

## 2024-04-02 NOTE — TELEPHONE ENCOUNTER
Call placed to Patient.  Relayed M Hair's message.  Appointment scheduled for 4/3/24 at 1255 per Nicole's message.  Ganesh Mckeno RN

## 2024-04-02 NOTE — TELEPHONE ENCOUNTER
Patient has had headache, cough, mild fever and ear pain since 3/29. Home covid test negative. Patient leaving for [place] 4/6 so hoping to figure out what's going on asap. Patient willing to do e-visit, but wondering if her ear needs to be looked at. No open appointments, please advise.    Thank you,    Viviana Watters, TESFAYE Donaldson

## 2024-04-02 NOTE — TELEPHONE ENCOUNTER
I have a 1 pm tomorrow that she could take - its' virtual but we can change it to an in person and just have her arrive at 12:55 (versus 12:40)     Nicole

## 2024-04-03 ENCOUNTER — OFFICE VISIT (OUTPATIENT)
Dept: FAMILY MEDICINE | Facility: CLINIC | Age: 44
End: 2024-04-03
Payer: COMMERCIAL

## 2024-04-03 VITALS
OXYGEN SATURATION: 94 % | DIASTOLIC BLOOD PRESSURE: 98 MMHG | TEMPERATURE: 98.4 F | HEART RATE: 97 BPM | SYSTOLIC BLOOD PRESSURE: 140 MMHG

## 2024-04-03 DIAGNOSIS — J01.90 ACUTE SINUSITIS WITH SYMPTOMS > 10 DAYS: Primary | ICD-10-CM

## 2024-04-03 DIAGNOSIS — I10 PRIMARY HYPERTENSION: ICD-10-CM

## 2024-04-03 DIAGNOSIS — E66.01 MORBID OBESITY (H): ICD-10-CM

## 2024-04-03 PROCEDURE — 99213 OFFICE O/P EST LOW 20 MIN: CPT | Performed by: PHYSICIAN ASSISTANT

## 2024-04-03 RX ORDER — DOXYCYCLINE 100 MG/1
100 CAPSULE ORAL 2 TIMES DAILY
Qty: 14 CAPSULE | Refills: 0 | Status: SHIPPED | OUTPATIENT
Start: 2024-04-03 | End: 2024-04-10

## 2024-04-03 NOTE — PROGRESS NOTES
"  Assessment & Plan       ICD-10-CM    1. Acute sinusitis with symptoms > 10 days  J01.90 doxycycline hyclate (VIBRAMYCIN) 100 MG capsule      2. Morbid obesity (H)  E66.01       3. Primary hypertension  I10         Discussed natural course of sinus infections and that most of them are due to viruses but in that symptoms should be improving after 10 days and no new or worsening symptoms noted.   She is leaving for [place] in 3 days - will have her continue over the counter management of symptoms. Prior to flying would have her continue the sudafed and flonase but consider using afrin the day prior and day of travel to reduce pressure behind ears  If she starts the antibiotic will need to make sure she is using sunscreen and protecting herself in the sun due to photosensitivity     Blood pressure x2 elevated -  likely in part due to regular use of sudafed over last few days and not feeling well. Asked that she monitor this at home, especially after she is feeling better and if it remains elevated she return to clinic to discuss adjustment to medications            Subjective   See is a 43 year old, presenting for the following health issues:  Ear Problem        4/3/2024    12:59 PM   Additional Questions   Roomed by SARATH Alberts     History of Present Illness       Headaches:   Since the patient's last clinic visit, headaches are: no change  The patient is getting headaches:  Headaches  She is able to do normal daily activities when she has a migraine.  The patient is taking the following rescue/relief medications:  Ibuprofen (Advil, Motrin), Tylenol and other   Patient states \"I get some relief\" from the rescue/relief medications.   The patient is taking the following medications to prevent migraines:  No medications to prevent migraines  In the past 4 weeks, the patient has gone to an Urgent Care or Emergency Room 0 times times due to headaches.    Reason for visit:  Cough,headache,ear ache,fever yesterday not today so " far  Symptom onset:  3-7 days ago  Symptoms include:  Same as above  Symptom intensity:  Moderate  Symptom progression:  Worsening  Had these symptoms before:  Yes  Has tried/received treatment for these symptoms:  Yes  Previous treatment was successful:  Yes  Prior treatment description:  Antibiotics and sudafed  What makes it worse:  No  What makes it better:  Meds    She eats 0-1 servings of fruits and vegetables daily.She consumes 0 sweetened beverage(s) daily.She exercises with enough effort to increase her heart rate 10 to 19 minutes per day.  She exercises with enough effort to increase her heart rate 3 or less days per week.   She is taking medications regularly.           Review of Systems  Remainder of ROS obtained and found to be negative other than that which was documented above        Objective    BP (!) 140/98   Pulse 97   Temp 98.4  F (36.9  C) (Tympanic)   LMP 02/05/2024   SpO2 94%   There is no height or weight on file to calculate BMI.  Physical Exam   GENERAL: alert and no distress  EYES: Eyes grossly normal to inspection  HENT: ear canals and TM's normal, nose and mouth without ulcers or lesions  NECK: no adenopathy, no asymmetry, masses, or scars  RESP: lungs clear to auscultation - no rales, rhonchi or wheezes  CV: regular rates and rhythm, normal S1 S2, no S3 or S4, and no peripheral edema            Signed Electronically by: Nicole Arndt PA-C

## 2024-05-01 ENCOUNTER — VIRTUAL VISIT (OUTPATIENT)
Dept: CARDIOLOGY | Facility: CLINIC | Age: 44
End: 2024-05-01
Attending: FAMILY MEDICINE
Payer: COMMERCIAL

## 2024-05-01 VITALS — WEIGHT: 196 LBS | BODY MASS INDEX: 39.57 KG/M2

## 2024-05-01 DIAGNOSIS — F41.1 GENERALIZED ANXIETY DISORDER: ICD-10-CM

## 2024-05-01 DIAGNOSIS — I10 PRIMARY HYPERTENSION: ICD-10-CM

## 2024-05-01 DIAGNOSIS — K21.9 GERD WITHOUT ESOPHAGITIS: ICD-10-CM

## 2024-05-01 DIAGNOSIS — N92.4 EXCESSIVE BLEEDING IN PREMENOPAUSAL PERIOD: ICD-10-CM

## 2024-05-01 DIAGNOSIS — J30.2 SEASONAL ALLERGIES: ICD-10-CM

## 2024-05-01 DIAGNOSIS — F32.4 MAJOR DEPRESSIVE DISORDER IN PARTIAL REMISSION, UNSPECIFIED WHETHER RECURRENT (H): ICD-10-CM

## 2024-05-01 DIAGNOSIS — E66.01 OBESITY, CLASS III, BMI 40-49.9 (MORBID OBESITY) (H): Primary | ICD-10-CM

## 2024-05-01 ASSESSMENT — PAIN SCALES - GENERAL: PAINLEVEL: NO PAIN (0)

## 2024-05-01 NOTE — Clinical Note
I had the pleasure of meeting with See per [facility] insurance requirements to help with her weight management journey. As Nicole is aware, she has been on Wegovy for years with some success, but we are working transitioning her to Zepbound as it is more potent. She is aware of the severe shortage of Zepbound and that she may need to continue on Wegovy for awhile until available. Will continue to follow up with me for refills per [facility] insurance requirements, will follow with Nicole as provider for her weight management and other concerns.  Nicole - of note, patient suggested depressed mood since starting norethindrone last week. Not severe, but notable she states. I told her to keep an eye on this with you (she does feel that it is somewhat effective for menorrhagia) -- I wanted to put it on your radar as well.  Marlene Bernal, Pharm D., MPH  Medication Therapy Management Pharmacist  [facility]

## 2024-05-01 NOTE — PROGRESS NOTES
"Virtual Visit Details    Type of service:  Video Visit     Originating Location (pt. Location): {video visit patient location:740012::\"Home\"}  {PROVIDER LOCATION On-site should be selected for visits conducted from your clinic location or adjoining [facility] hospital, academic office, or other nearby [facility] building. Off-site should be selected for all other provider locations, including home:[id number]}  Distant Location (provider location):  {virtual location provider:[id number]}  Platform used for Video Visit: {Virtual Visit Platforms:992351::\"[employer]\"}    "

## 2024-05-01 NOTE — NURSING NOTE
Is the patient currently in the state of [place]? YES    Visit mode:VIDEO    If the visit is dropped, the patient can be reconnected by: VIDEO VISIT: Text to cell phone:   Telephone Information:   Mobile [phone]       Will anyone else be joining the visit? NO  (If patient encounters technical issues they should call [phone])    How would you like to obtain your AVS? MyChart    Are changes needed to the allergy or medication list? Pt stated no changes to allergies and Pt stated no med changes    Reason for visit: Consult    Ivania TOMLIN

## 2024-05-01 NOTE — PROGRESS NOTES
Medication Therapy Management (MTM) Encounter    ASSESSMENT:                            Medication Adherence/Access: See below for considerations        Obesity   Class II Obesity (BMI 35 to 39.9): Patient would benefit from additional pharmacotherapy for weight management. Given class III obesity prior to weight management medication, recommend to continue GLP1/GIP therapy as data to support most significant weight loss and patient has no contraindications. Recommend increase potency to Zepbound 5mg - patient made aware of shortage issues and will communicate with Medication Therapy Management as needed for supply shortage issues. Patient also likely to benefit from reduction in food noise and increased satiety. Discussed dietary and behavioral modifications. May consider addition of other pharmacotherapies for weight management if optimizing Zepbound not effective.    Completed teaching of administration of Zepbound. Discussed mechanism of action, side effects, warnings, and efficacy. Discussed appropriate storage and stability. Answered patient questions.      For patients that are under [employer] insurance coverage, it is mandated by insurance that each qualifying patient meet with hospital based Weight Management Medication Therapy Management pharmacist to continue therapy coverage. The following patient meets the below coverage criteria and can therefore continue GLP-1/GIP agonist therapy for Weight Management:    Adult  BMI >40 with or without comorbidities   at time of initiating GLP-1/GIP agonist therapy Approved for 29 weeks  Met Updated Initial Criteria   At least 5% weight loss of baseline body weight  Approved for 12 months      Hypertension: blood pressure not at goal <130/80. Suspect recent clinic readings elevated secondary to pseudoephedrine, stress and illness (see below). Recommend to continue to monitor at this time. Increasing activity and further weight management expected  "to help reduce blood pressure secondarily some what.    Menorrhagia:improving. Discussed risk/benefit of continuing hormonal treatment - patient prefers to continue for at least 1 month and will follow up with PCP for alternatives if indicated. Also provided support for mental health concerns (See below).    Anxiety/depression : spent time today discussing possibility of ssri/snri for depression and anxiety if symptoms worsen. Patient declines at this time, but will discuss with PCP if interested in the future.    Allergies:stable - education provided to avoid pseudoephedrine and if must use, monitor blood pressure closely due to hypertension risk.     GERD: stable         PLAN:                            [employer] employees with [employer] insurance ([facility]) are restricted to using the [employer] [facility] for Saxenda, Wegovy, and Zepbound    [facility]  [place], [place] - [facility] [address]  Phone: [phone]    Next steps:  After processing the prescription and saving to your profile, the pharmacy will give you an automated call to inform you that they have your prescription on file. This typically occurs within 1-2 days after the prescription is sent but may take 3-5 business days during high volume times.  You will need to call the pharmacy back to set up the first delivery of every new prescription or new medication dose.   Once you are on a stable dose, you can inquire with the pharmacy about signing up for \"Text to Order through [employer]\", \"Auto Fill\", and/or using the \"My [employer] Rx\" fuad.     2. Continue Wegovy 2.4 mg every 10 days for now. I sent in Zepbound 5mg once weekly prescription today. It is on shortage as we know, so let me know if you need me to send in another prescription for Wegovy 2.4 mg if you run out of your current supply before the Zepbound 5mg is available.      As a reminder, here is a great link for how to administer your " Zepbound:  https://zepbound.[employer].com/how-to-use      If cost is prohibitive, you may try using Zepbound coupon (https://zepbound.[employer].com/coverage-savings)    3. To help with tolerability of Zepbound :  Eat small meals/snacks throughout the day (about every 2 hours)  Focus on getting protein in first with each meal and snack.   Drink plenty of water - goal 64 oz throughout the day  You may try Metamucil, Benefiber, or Citrucel to help feel more full (less nausea) and have softer, more consistent bowel movements.  To optimize weight management - work on incorporating resistance training/weight lifting to build muscle and improve overall metabolism of adipose tissue.    4. Work to limit pseudoephedrine use. If you must use it, be sure you monitor your blood pressure closely.    5. Continue to follow with Nicole Arndt PA-C to review menstrual symptoms and if your mood worsens with norethindrone to discuss alternatives.    Follow-up: after about 4-8 weeks on Zepbound 5mg with Marlene Bernal [facility] . Please reach out to me to let me know when you start this med and schedule a follow up at your convenience about 4-8 weeks after starting.    SUBJECTIVE/OBJECTIVE:                          Arturo Islas is a 43 year old female contacted via secure video for an initial visit. She was referred to me from [facility] insurance requirements.      Reason for visit: Medication Therapy Management - [facility] insurance requirements GLP1/GIP Management .    Allergies/ADRs: Reviewed in chart  Past Medical History: Reviewed in chart  Tobacco: She reports that she quit smoking about 19 years ago. Her smoking use included cigarettes. She started smoking about 29 years ago. She has a 5 pack-year smoking history. She has never been exposed to tobacco smoke. She has never used smokeless tobacco.  Alcohol: not currently using  Caffeine: coffee most days per week    Medication Adherence/Access: Medication barriers: obtaining medication from  "insurance and pharmacy.     Obesity   Class II Obesity (BMI 35 to 39.9):   Wegovy 2.4 mg once weekly (takes every 10 days to help reduce lapse in treatment - has about 1 month supply remaining)    Patient managing with PCP - Nicole Arndt PA-C at [facility]; pleased with this relationship, declines referral to [facility]     Patient reports no current medication side effects. She feels Wegovy has helped somewhat reduce food noise, but frustrated in weight loss plateau. Has been on Wegovy for over 2 years and has recently been working on ways to improve its efficacy including joining Weight Wat[employer] and changing workout patterns. This has only been mildly effective. Patient denies personal or family history of MEN Type2, MTC, Pancreatitis.     Nutrition/Eating Habits: working on getting 100g protein daily (difficult, but is sure to get more than 60 g). Following with Weight Watchers.   Coffee and smoothie in the a.m., lunch is small - crackers and cheese, dinner  Exercise/Activity: has somewhat stalled in frequency. Would like to work on ways to improve efficacy and efficiency of work out.       Wt Readings from Last 4 Encounters:   05/01/24 88.9 kg (196 lb)   03/26/24 89.7 kg (197 lb 12.8 oz)   02/27/24 89 kg (196 lb 3.2 oz)   12/29/23 90.3 kg (199 lb)     Estimated body mass index is 39.57 kg/m  as calculated from the following:    Height as of 3/26/24: 1.499 m (4' 11.02\").    Weight as of this encounter: 88.9 kg (196 lb).      Initial weight before GLP1/GIP (4/8/2022): 213 lb (BMI 43.02 kg/m2)  Current weight: 196 lb  Weight change: -17 lb (-8%)    Hypertension:   Losartan 50 mg once daily     Patient reports no current medication side effects.  Patient does not self-monitor blood pressure.  Notes blood pressure has been elevated at last clinic visits due to illness and stress (at last visit to clinic, patient had taken pseudoephedrine).    BP Readings from Last 3 Encounters: "   04/03/24 (!) 140/98   03/26/24 (!) 141/91   02/27/24 126/87     Menorrhagia:  Norethindrone 5 mg once daily  Ibuprofen 800 mg - 1 tab every 8 hours as needed     Patient experiencing significant menstrual bleeding and cramps. 1 week ago started hormone treatment to help reduce bleeding and symptoms. She feels some improvement in these symptoms, but feels may have increased depressed mood with this. Difficult for patient to discern if this is secondary to situational depression from more rainy days recently, feeling of loss that comes/goes from death of , or from starting hormonal treatment. Denies thoughts of harm to self or others, just more sad. Prefers to continue on this treatment for now and monitor mood as helpful for menorrhagia -- but will monitor.    Anxiety/depression :   Hydroxyzine 25 mg - 1 tab as needed for anxiety at night  Hydroxyzine 10 mg - 1 tab as needed for anxiety during the day (less sedating dose)    Patient notes situational stress that is well managed most of the time. She will occasionally take hydroxyzine for days where anxiety is overwhelming. Prefers to take as needed meds for mood than daily. Denies side effects     Allergies:  Xyzal 5mg once daily  Flonase 50 mcg - 1-2 sprays both nostrils once daily  Pseudoephedrine 30 mg  - 1-2 tabs every 4 hours as needed for congestion   Ketotifen 0.025% eye drops - 1 drop both eyes twice daily as needed     Patient has siUses Pseudoephedrine 2-4 times per year to prevent congestion progressing to sinus infection)     GERD:   Famotidine 20 mg once daily     Patient reports no current symptoms. Has reduces from prescribed twice daily to once daily dosing at bedtime. This is effective - does not note increased reflux since starting Wegovy.   Patient feels that current regimen is effective.  Patient has tried a trial off of therapy and is not interested in doing so.        Today's Vitals: Wt 88.9 kg (196 lb)   BMI 39.57 kg/m     ----------------      I spent 26 minutes with this patient today. All changes were made via collaborative practice agreement with Leslie Dillon PA-C . A copy of the visit note was provided to the patient's provider(s).    A summary of these recommendations was sent via [employer].    Marlene Bernal, Pharm D., MPH    Medication Therapy Management Pharmacist   [facility]      Telemedicine Visit Details  Type of service:  Video Conference via Cinchcast  Start Time:  10:04 AM  End Time:  10:30 AM     Medication Therapy Recommendations  Obesity, Class III, BMI 40-49.9 (morbid obesity) (H)    Current Medication: Semaglutide-Weight Management (WEGOVY) 2.4 MG/0.75ML pen   Rationale: More effective medication available - Ineffective medication - Effectiveness   Recommendation: Change Medication - zepbound   Status: Accepted per CPA         Seasonal allergies    Current Medication: pseudoePHEDrine (SUDOGEST) 30 MG tablet   Rationale: Unsafe medication for the patient - Adverse medication event - Safety   Recommendation: Provide Education   Status: Patient Agreed - Adherence/Education

## 2024-05-01 NOTE — LETTER
5/1/2024      RE: See Roshan  [address] [place] [id number]       Dear Colleague,    Thank you for the opportunity to participate in the care of your patient, See Roshan at the [facility]. Please see a copy of my visit note below.    Medication Therapy Management (MTM) Encounter    ASSESSMENT:                            Medication Adherence/Access: See below for considerations        Obesity   Class II Obesity (BMI 35 to 39.9): Patient would benefit from additional pharmacotherapy for weight management. Given class III obesity prior to weight management medication, recommend to continue GLP1/GIP therapy as data to support most significant weight loss and patient has no contraindications. Recommend increase potency to Zepbound 5mg - patient made aware of shortage issues and will communicate with Medication Therapy Management as needed for supply shortage issues. Patient also likely to benefit from reduction in food noise and increased satiety. Discussed dietary and behavioral modifications. May consider addition of other pharmacotherapies for weight management if optimizing Zepbound not effective.    Completed teaching of administration of Zepbound. Discussed mechanism of action, side effects, warnings, and efficacy. Discussed appropriate storage and stability. Answered patient questions.      For patients that are under [employer] insurance coverage, it is mandated by insurance that each qualifying patient meet with hospital based Weight Management Medication Therapy Management pharmacist to continue therapy coverage. The following patient meets the below coverage criteria and can therefore continue GLP-1/GIP agonist therapy for Weight Management:    Adult  BMI >40 with or without comorbidities   at time of initiating GLP-1/GIP agonist therapy Approved for 29 weeks  Met Updated Initial Criteria   At least  "5% weight loss of baseline body weight  Approved for 12 months      Hypertension: blood pressure not at goal <130/80. Suspect recent clinic readings elevated secondary to pseudoephedrine, stress and illness (see below). Recommend to continue to monitor at this time. Increasing activity and further weight management expected to help reduce blood pressure secondarily some what.    Menorrhagia:improving. Discussed risk/benefit of continuing hormonal treatment - patient prefers to continue for at least 1 month and will follow up with PCP for alternatives if indicated. Also provided support for mental health concerns (See below).    Anxiety/depression : spent time today discussing possibility of ssri/snri for depression and anxiety if symptoms worsen. Patient declines at this time, but will discuss with PCP if interested in the future.    Allergies:stable - education provided to avoid pseudoephedrine and if must use, monitor blood pressure closely due to hypertension risk.     GERD: stable         PLAN:                            [employer] employees with [employer] insurance ([facility]) are restricted to using the [employer] Mail Order[facility] Pharmacy for Saxenda, Wegovy, and Zepbound    [facility]  [place], [place] - [facility] [address]   Phone: [phone]    Next steps:  After processing the prescription and saving to your profile, the pharmacy will give you an automated call to inform you that they have your prescription on file. This typically occurs within 1-2 days after the prescription is sent but may take 3-5 business days during high volume times.  You will need to call the pharmacy back to set up the first delivery of every new prescription or new medication dose.   Once you are on a stable dose, you can inquire with the pharmacy about signing up for \"Text to Order through Oesia\", \"Auto Fill\", and/or using the \"[employer] Rx\" fuad.     2. Continue Wegovy 2.4 mg every 10 " days for now. I sent in Zepbound 5mg once weekly prescription today. It is on shortage as we know, so let me know if you need me to send in another prescription for Wegovy 2.4 mg if you run out of your current supply before the Zepbound 5mg is available.      As a reminder, here is a great link for how to administer your Zepbound:  https://zepbound.[employer]/how-to-use      If cost is prohibitive, you may try using Zepbound coupon (https://zepbound.[employer].com/coverage-savings)    3. To help with tolerability of Zepbound :  Eat small meals/snacks throughout the day (about every 2 hours)  Focus on getting protein in first with each meal and snack.   Drink plenty of water - goal 64 oz throughout the day  You may try Metamucil, Benefiber, or Citrucel to help feel more full (less nausea) and have softer, more consistent bowel movements.  To optimize weight management - work on incorporating resistance training/weight lifting to build muscle and improve overall metabolism of adipose tissue.    4. Work to limit pseudoephedrine use. If you must use it, be sure you monitor your blood pressure closely.    5. Continue to follow with Nicole Arndt PA-C to review menstrual symptoms and if your mood worsens with norethindrone to discuss alternatives.    Follow-up: after about 4-8 weeks on Zepbound 5mg with Marlene Bernal [facility] . Please reach out to me to let me know when you start this med and schedule a follow up at your convenience about 4-8 weeks after starting.    SUBJECTIVE/OBJECTIVE:                          Arturo Islas is a 43 year old female contacted via secure video for an initial visit. She was referred to me from [facility] insurance requirements.      Reason for visit: Medication Therapy Management - [facility] insurance requirements GLP1/GIP Management .    Allergies/ADRs: Reviewed in chart  Past Medical History: Reviewed in chart  Tobacco: She reports that she quit smoking about 19 years ago. Her smoking use included  "cigarettes. She started smoking about 29 years ago. She has a 5 pack-year smoking history. She has never been exposed to tobacco smoke. She has never used smokeless tobacco.  Alcohol: not currently using  Caffeine: coffee most days per week    Medication Adherence/Access: Medication barriers: obtaining medication from insurance and pharmacy.     Obesity  Class II Obesity (BMI 35 to 39.9):   Wegovy 2.4 mg once weekly (takes every 10 days to help reduce lapse in treatment - has about 1 month supply remaining)    Patient managing with PCP - Nicole Arndt PA-C at [facility]; pleased with this relationship, declines referral to [facility]     Patient reports no current medication side effects. She feels Wegovy has helped somewhat reduce food noise, but frustrated in weight loss plateau. Has been on Wegovy for over 2 years and has recently been working on ways to improve its efficacy including joining Weight Watchers and changing workout patterns. This has only been mildly effective. Patient denies personal or family history of MEN Type2, MTC, Pancreatitis.     Nutrition/Eating Habits: working on getting 100g protein daily (difficult, but is sure to get more than 60 g). Following with Weight Watchers.   Coffee and smoothie in the a.m., lunch is small - crackers and cheese, dinner  Exercise/Activity: has somewhat stalled in frequency. Would like to work on ways to improve efficacy and efficiency of work out.       Wt Readings from Last 4 Encounters:   05/01/24 88.9 kg (196 lb)   03/26/24 89.7 kg (197 lb 12.8 oz)   02/27/24 89 kg (196 lb 3.2 oz)   12/29/23 90.3 kg (199 lb)     Estimated body mass index is 39.57 kg/m  as calculated from the following:    Height as of 3/26/24: 1.499 m (4' 11.02\").    Weight as of this encounter: 88.9 kg (196 lb).      Initial weight before GLP1/GIP (4/8/2022): 213 lb (BMI 43.02 kg/m2)  Current weight: 196 lb  Weight change: -17 lb (-8%)    Hypertension: "   Losartan 50 mg once daily     Patient reports no current medication side effects.  Patient does not self-monitor blood pressure.  Notes blood pressure has been elevated at last clinic visits due to illness and stress (at last visit to clinic, patient had taken pseudoephedrine).    BP Readings from Last 3 Encounters:   04/03/24 (!) 140/98   03/26/24 (!) 141/91   02/27/24 126/87     Menorrhagia:  Norethindrone 5 mg once daily  Ibuprofen 800 mg - 1 tab every 8 hours as needed     Patient experiencing significant menstrual bleeding and cramps. 1 week ago started hormone treatment to help reduce bleeding and symptoms. She feels some improvement in these symptoms, but feels may have increased depressed mood with this. Difficult for patient to discern if this is secondary to situational depression from more rainy days recently, feeling of loss that comes/goes from death of , or from starting hormonal treatment. Denies thoughts of harm to self or others, just more sad. Prefers to continue on this treatment for now and monitor mood as helpful for menorrhagia -- but will monitor.    Anxiety/depression :   Hydroxyzine 25 mg - 1 tab as needed for anxiety at night  Hydroxyzine 10 mg - 1 tab as needed for anxiety during the day (less sedating dose)    Patient notes situational stress that is well managed most of the time. She will occasionally take hydroxyzine for days where anxiety is overwhelming. Prefers to take as needed meds for mood than daily. Denies side effects     Allergies:  Xyzal 5mg once daily  Flonase 50 mcg - 1-2 sprays both nostrils once daily  Pseudoephedrine 30 mg  - 1-2 tabs every 4 hours as needed for congestion   Ketotifen 0.025% eye drops - 1 drop both eyes twice daily as needed     Patient has siUses Pseudoephedrine 2-4 times per year to prevent congestion progressing to sinus infection)     GERD:   Famotidine 20 mg once daily     Patient reports no current symptoms. Has reduces from prescribed  twice daily to once daily dosing at bedtime. This is effective - does not note increased reflux since starting Wegovy.   Patient feels that current regimen is effective.  Patient has tried a trial off of therapy and is not interested in doing so.        Today's Vitals: Wt 88.9 kg (196 lb)   BMI 39.57 kg/m    ----------------      I spent 26 minutes with this patient today. All changes were made via collaborative practice agreement with Leslie Dillon PA-C . A copy of the visit note was provided to the patient's provider(s).    A summary of these recommendations was sent via [employer].    Marlene Bernal, Pharm D., MPH    Medication Therapy Management Pharmacist   [facility]      Telemedicine Visit Details  Type of service:  Video Conference via LookStat  Start Time:  10:04 AM  End Time:  10:30 AM     Medication Therapy Recommendations  Obesity, Class III, BMI 40-49.9 (morbid obesity) (H)    Current Medication: Semaglutide-Weight Management (WEGOVY) 2.4 MG/0.75ML pen   Rationale: More effective medication available - Ineffective medication - Effectiveness   Recommendation: Change Medication - zepbound   Status: Accepted per CPA         Seasonal allergies    Current Medication: pseudoePHEDrine (SUDOGEST) 30 MG tablet   Rationale: Unsafe medication for the patient - Adverse medication event - Safety   Recommendation: Provide Education   Status: Patient Agreed - Adherence/Education                Please do not hesitate to contact me if you have any questions/concerns.     Sincerely,     Marlene Bernal, [facility]

## 2024-05-02 ENCOUNTER — TELEPHONE (OUTPATIENT)
Dept: ENDOCRINOLOGY | Facility: CLINIC | Age: 44
End: 2024-05-02
Payer: COMMERCIAL

## 2024-05-02 NOTE — PATIENT INSTRUCTIONS
"Recommendations from MTM Pharmacist visit:                                                    MTM (medication therapy management) is a service provided by a clinical pharmacist designed to help you get the most of out of your medicines.  You may be sent a phone or email survey evaluating today's visit.  Please provide feedback you have for the service he received today if you are able.      [employer] employees with [employer] insurance ([facility]) are restricted to using the [facility] for Saxenda, Wegovy, and Zepbound    [facility]  [place], [place] - [address]  Phone: [phone]    Next steps:  After processing the prescription and saving to your profile, the pharmacy will give you an automated call to inform you that they have your prescription on file. This typically occurs within 1-2 days after the prescription is sent but may take 3-5 business days during high volume times.  You will need to call the pharmacy back to set up the first delivery of every new prescription or new medication dose.   Once you are on a stable dose, you can inquire with the pharmacy about signing up for \"Text to Order through [employer]\", \"Auto Fill\", and/or using the \"My [employer] Rx\" fuad.     2. Continue Wegovy 2.4 mg every 10 days for now. I sent in Zepbound 5mg once weekly prescription today. It is on shortage as we know, so let me know if you need me to send in another prescription for Wegovy 2.4 mg if you run out of your current supply before the Zepbound 5mg is available.      As a reminder, here is a great link for how to administer your Zepbound:  https://[email or web address]/how-to-use      If cost is prohibitive, you may try using Zepbound coupon (https://zepbound.[email or web address]/coverage-savings)    3. To help with tolerability of Zepbound :  Eat small meals/snacks throughout the day (about every 2 hours)  Focus on getting protein in first with each meal and " "snack.   Drink plenty of water - goal 64 oz throughout the day  You may try Metamucil, Benefiber, or Citrucel to help feel more full (less nausea) and have softer, more consistent bowel movements.  To optimize weight management - work on incorporating resistance training/weight lifting to build muscle and improve overall metabolism of adipose tissue.    4. Work to limit pseudoephedrine use. If you must use it, be sure you monitor your blood pressure closely.    5. Continue to follow with Nicole Arndt PA-C to review menstrual symptoms and if your mood worsens with norethindrone to discuss alternatives.    Follow-up: after about 4-8 weeks on Zepbound 5mg with Marlene Bernal [facility] . Please reach out to me to let me know when you start this med and schedule a follow up at your convenience about 4-8 weeks after starting.        It was great speaking with you today.  I value your experience and would be very thankful for your time in providing feedback in our clinic survey. In the next few days, you may receive an email or text message from [facility] with a link to a survey related to your  clinical pharmacist.\"     To schedule another MTM appointment, please call the clinic directly ([facility] Phone Number: [phone] (schedules for [facility] and [facility] - providers, dietitians, health coaches) or you may call the MTM scheduling line at [phone] or toll-free at [phone].     My Clinical Pharmacist's contact information:                                                      Please feel free to contact me with any questions or concerns you have.      Marlene Bernal, Pharm D., MPH    Medication Therapy Management Pharmacist   [facility]           "

## 2024-05-02 NOTE — TELEPHONE ENCOUNTER
Prior Authorization Retail Medication Request    Medication/Dose: [facility] insurance requirements Zepbound 2.5mg - 15 mg  (all doses) as indicated by supply, safety, and efficacy.    Diagnosis and ICD code (if different than what is on RX):  Obesity III before starting GLP1/GIP   New/renewal/insurance change PA/secondary ins. PA:  Previously Tried and Failed:  wegovy, diet and exercise  Rationale:  Patient met with [facility] Medication Therapy Management Pharmacist 05/01/2024 per [facility] insurance requirements. Patient denies personal or family history of MEN Type2, MTC, Pancreatitis.       Insurance     Primary Visit Coverage    Payer Plan Sponsor Code Group Number Group Name   [facility] [id number]      Primary Visit Coverage Subscriber    ID Name N Address   [id number] GARRY,[facility]  [address] [place] [id number]       Pharmacy Information (if different than what is on RX)  Name:  [facility]    Phone: [phone]  - Press 2 to leave a refill request on a secured voicemail  - Press 3 to place an automated refill request  - Press 4 to speak directly to a member of the pharmacy staff    Hours: Monday through Friday 8am to 7pm and Saturday 8am to 4pm      Marlene Bernal, Pharm D., MPH    Medication Therapy Management Pharmacist   [facility]

## 2024-05-03 NOTE — TELEPHONE ENCOUNTER
PA Initiation    Medication: ZEPBOUND 5 MG/0.5ML SC SOAJ  Insurance Company: [employer] - Phone [phone] Fax [phone]  Pharmacy Filling the Rx: [facility] - [place], [place] - [facility] [address]  Filling Pharmacy Phone: [phone]  Filling Pharmacy Fax: [phone]  Start Date: 5/3/2024

## 2024-05-08 NOTE — TELEPHONE ENCOUNTER
Prior Authorization Approval    Medication: ZEPBOUND 5 MG/0.5ML SC SOAJ  Authorization Effective Date: 5/7/2024  Authorization Expiration Date: 11/3/2024  Approved Dose/Quantity: 1 month  Reference #: CMM [id number]   Insurance Company: [employer] - Phone [phone] Fax [phone]  Expected CoPay: $    CoPay Card Available:      Financial Assistance Needed: n/a  Which Pharmacy is filling the prescription: [facility] - [place], [place] - [facility] [address]   Pharmacy Notified: called pharmacy  Patient Notified: [employer] message sent to patient

## 2024-05-08 NOTE — TELEPHONE ENCOUNTER
PA Approved for [facility]. Called pharmacy spoke to Lori and gave Approval. [employer] message has been sent to patient. Closing encounter

## 2024-05-22 ENCOUNTER — MYC MEDICAL ADVICE (OUTPATIENT)
Dept: CARDIOLOGY | Facility: CLINIC | Age: 44
End: 2024-05-22
Payer: COMMERCIAL

## 2024-05-22 DIAGNOSIS — E66.01 OBESITY, CLASS III, BMI 40-49.9 (MORBID OBESITY) (H): Primary | ICD-10-CM

## 2024-06-17 ENCOUNTER — MYC REFILL (OUTPATIENT)
Dept: FAMILY MEDICINE | Facility: CLINIC | Age: 44
End: 2024-06-17
Payer: COMMERCIAL

## 2024-06-17 DIAGNOSIS — F43.23 ADJUSTMENT DISORDER WITH MIXED ANXIETY AND DEPRESSED MOOD: ICD-10-CM

## 2024-06-18 RX ORDER — HYDROXYZINE HYDROCHLORIDE 25 MG/1
25-50 TABLET, FILM COATED ORAL EVERY 6 HOURS PRN
Qty: 60 TABLET | Refills: 11 | Status: SHIPPED | OUTPATIENT
Start: 2024-06-18

## 2024-07-12 DIAGNOSIS — K21.00 GASTROESOPHAGEAL REFLUX DISEASE WITH ESOPHAGITIS WITHOUT HEMORRHAGE: ICD-10-CM

## 2024-07-12 RX ORDER — FAMOTIDINE 20 MG/1
20 TABLET, FILM COATED ORAL 2 TIMES DAILY
Qty: 180 TABLET | Refills: 3 | Status: SHIPPED | OUTPATIENT
Start: 2024-07-12

## 2024-08-13 ENCOUNTER — VIRTUAL VISIT (OUTPATIENT)
Dept: CARDIOLOGY | Facility: CLINIC | Age: 44
End: 2024-08-13
Attending: FAMILY MEDICINE
Payer: COMMERCIAL

## 2024-08-13 VITALS — BODY MASS INDEX: 38.71 KG/M2 | WEIGHT: 192 LBS | HEIGHT: 59 IN

## 2024-08-13 DIAGNOSIS — E66.01 OBESITY, CLASS III, BMI 40-49.9 (MORBID OBESITY) (H): Primary | ICD-10-CM

## 2024-08-13 ASSESSMENT — PAIN SCALES - GENERAL: PAINLEVEL: NO PAIN (0)

## 2024-08-13 NOTE — LETTER
8/13/2024      RE: See Roshan  [address] [place] [id number]       Dear Colleague,    Thank you for the opportunity to participate in the care of your patient, See Roshan at the [facility]. Please see a copy of my visit note below.    Medication Therapy Management (MTM) Encounter    ASSESSMENT:                            Medication Adherence/Access: No issues identified    Weight management : patient congratulated on improvements in appetite and weight management. Has lost 9.6% tbw since starting GLP1/GIP. Zepbound helpful with weight plateau, but some suspicion weight management not optimized secondary to malnutrition - not eating frequently enough (forgetting lunch and going too long between meals/snacks). Discussed dietary and behavioral modifications.      Patient would benefit from additional pharmacotherapy for weight management. Given class III obesity, recommend optimizing GLP1/GIP therapy as data to support most significant weight loss and patient has no contraindications. Patient also likely to benefit from reduction in food noise and increased satiety. Recommend increase to Zepbound 7.5mg weekly.    For patients that are under [employer] insurance coverage, it is mandated by insurance that each qualifying patient meet with hospital based Weight Management Medication Therapy Management pharmacist to continue therapy coverage. The following patient meets the below coverage criteria and can therefore continue GLP-1/GIP agonist therapy for Weight Management:    Adult  BMI >40 with or without comorbidities   OR   BMI >30 + NAFLD*   at time of initiating GLP-1/GIP agonist therapy Approved for 29 weeks  Met Updated Initial Criteria   At least 5% weight loss of baseline body weight  Approved for 12 months          PLAN:                            Increase Zepbound to 7.5 mg weekly.     To help with  tolerability and effectiveness of Zepbound  :  Eat small meals/snacks throughout the day (about every 2-4 hours)  Focus on getting protein (keep working on goal of 100g daily) in first with each meal and snack.   Drink plenty of water - goal 64 oz throughout the day  You may try Metamucil, Benefiber, or Citrucel to help feel more full (less nausea) and have softer, more consistent bowel movements.  To optimize weight management - work on incorporating resistance training/weight lifting to build muscle and improve overall metabolism of adipose tissue.      Follow-up: Oct  18 with Nicole Arndt PA-C for blood pressure and med check  11/18 with Marlene Bernal [facility]       SUBJECTIVE/OBJECTIVE:                          See Roshan is a 43 year old female seen for a follow-up visit.       Reason for visit: Medication Therapy Management - GLP1/GIP Management UMR/MHFV insurance requirements .    Allergies/ADRs: Reviewed in chart  Past Medical History: Reviewed in chart  Tobacco: She reports that she quit smoking about 19 years ago. Her smoking use included cigarettes. She started smoking about 29 years ago. She has a 5 pack-year smoking history. She has never been exposed to tobacco smoke. She has never used smokeless tobacco.  Alcohol: not currently using  Caffeine: coffee most days per week    Medication Adherence/Access: no issues reported    Weight Management:   Zepbound 5mg weekly x 2 months   (has 2 month supply Zepbound 7.5mg at pharmacy, wonders if safe to increase at next refill?)    Patient managing with PCP - Nicole Arndt PA-C at [facility]; pleased with this relationship, declines referral to [facility]     About 2 weeks ago, noted that ran out of supplies to make smoothies and appetite a bit higher and eating more consistently - lost weight. When having smoothies, usually has large smoothie before work and will often forget to eat lunch or eat later in the afternoon -this is  "confusing to patient, not sure if related.    Patient reports no current medication side effects. Happy with reduced food nose - seeing some weight loss with Zepbound, was hopeful would see more with switch from Wegovy.  Patient denies personal or family history of MEN Type2, MTC, Pancreatitis.     Nutrition/Eating Habits: working on getting 100g protein daily (difficult, but is sure to get more than 60 g). Found high protein yogurt. Following with Weight Watchers.   Coffee and smoothie in the a.m., lunch is small - crackers and cheese (sometimes forgets and has later in afternoon), dinner - working on protein and veg; drinking 3 x 32 oz water daily.    Exercise/Activity: had gotten back into more activity and movement for a while, now working to increase this    History (from Medication Therapy Management visit 5/1/24):   She feels Wegovy has helped somewhat reduce food noise, but frustrated in weight loss plateau. Has been on Wegovy for over 2 years and has recently been working on ways to improve its efficacy including joining Weight [employer] and changing workout patterns. This has only been mildly effective.     Initial weight before GLP1/GIP (4/8/2022): 213 lb (BMI 43.02 kg/m2)        Current weight today: 192 lbs 0 oz  Cumulative Weight Loss: -21 lb, -9.6% from baseline    Wt Readings from Last 4 Encounters:   08/13/24 87.1 kg (192 lb)   05/01/24 88.9 kg (196 lb)   03/26/24 89.7 kg (197 lb 12.8 oz)   02/27/24 89 kg (196 lb 3.2 oz)     Estimated body mass index is 38.78 kg/m  as calculated from the following:    Height as of this encounter: 1.499 m (4' 11\").    Weight as of this encounter: 87.1 kg (192 lb).    Today's Vitals: Ht 1.499 m (4' 11\")   Wt 87.1 kg (192 lb)   BMI 38.78 kg/m    ----------------      I spent 20 minutes with this patient today. All changes were made via collaborative practice agreement with Leslie Dillon PA-C. A copy of the visit note was provided to the patient's provider(s).    A " summary of these recommendations was sent via [email or web address].    Marlene Bernal, Pharm D., MPH    Medication Therapy Management Pharmacist   [facility]      Telemedicine Visit Details  Type of service:  Video Conference via [employer]  Start Time:  2:33 PM  End Time:  2:53 PM     Medication Therapy Recommendations  Obesity, Class III, BMI 40-49.9 (morbid obesity) (H)    Current Medication: tirzepatide-Weight Management (ZEPBOUND) 5 MG/0.5ML prefilled pen   Rationale: Dose too low - Dosage too low - Effectiveness   Recommendation: Increase Dose   Status: Accepted per CPA                Please do not hesitate to contact me if you have any questions/concerns.     Sincerely,     Marlene Bernal, [facility]

## 2024-08-13 NOTE — PROGRESS NOTES
Medication Therapy Management (MTM) Encounter    ASSESSMENT:                            Medication Adherence/Access: No issues identified    Weight management : patient congratulated on improvements in appetite and weight management. Has lost 9.6% tbw since starting GLP1/GIP. Zepbound helpful with weight plateau, but some suspicion weight management not optimized secondary to malnutrition - not eating frequently enough (forgetting lunch and going too long between meals/snacks). Discussed dietary and behavioral modifications.      Patient would benefit from additional pharmacotherapy for weight management. Given class III obesity, recommend optimizing GLP1/GIP therapy as data to support most significant weight loss and patient has no contraindications. Patient also likely to benefit from reduction in food noise and increased satiety. Recommend increase to Zepbound 7.5mg weekly.    For patients that are under [employer] insurance coverage, it is mandated by insurance that each qualifying patient meet with hospital based Weight Management Medication Therapy Management pharmacist to continue therapy coverage. The following patient meets the below coverage criteria and can therefore continue GLP-1/GIP agonist therapy for Weight Management:    Adult  BMI >40 with or without comorbidities   OR   BMI >30 + NAFLD*   at time of initiating GLP-1/GIP agonist therapy Approved for 29 weeks  Met Updated Initial Criteria   At least 5% weight loss of baseline body weight  Approved for 12 months          PLAN:                            Increase Zepbound to 7.5 mg weekly.     To help with tolerability and effectiveness of Zepbound  :  Eat small meals/snacks throughout the day (about every 2-4 hours)  Focus on getting protein (keep working on goal of 100g daily) in first with each meal and snack.   Drink plenty of water - goal 64 oz throughout the day  You may try Metamucil, Benefiber, or Citrucel to help feel more  full (less nausea) and have softer, more consistent bowel movements.  To optimize weight management - work on incorporating resistance training/weight lifting to build muscle and improve overall metabolism of adipose tissue.      Follow-up: Oct  18 with Nicole Arndt PA-C for blood pressure and med check  11/18 with Marlene Bernal [facility]       SUBJECTIVE/OBJECTIVE:                          Arturo Islas is a 43 year old female seen for a follow-up visit.       Reason for visit: Medication Therapy Management - GLP1/GIP Management [employer] insurance requirements .    Allergies/ADRs: Reviewed in chart  Past Medical History: Reviewed in chart  Tobacco: She reports that she quit smoking about 19 years ago. Her smoking use included cigarettes. She started smoking about 29 years ago. She has a 5 pack-year smoking history. She has never been exposed to tobacco smoke. She has never used smokeless tobacco.  Alcohol: not currently using  Caffeine: coffee most days per week    Medication Adherence/Access: no issues reported    Weight Management :   Zepbound 5mg weekly x 2 months   (has 2 month supply Zepbound 7.5mg at pharmacy, wonders if safe to increase at next refill?)    Patient managing with PCP - Nicole Arndt PA-C at [facility]; pleased with this relationship, declines referral to [facility]     About 2 weeks ago, noted that ran out of supplies to make smoothies and appetite a bit higher and eating more consistently - lost weight. When having smoothies, usually has large smoothie before work and will often forget to eat lunch or eat later in the afternoon -this is confusing to patient, not sure if related.    Patient reports no current medication side effects. Happy with reduced food nose - seeing some weight loss with Zepbound, was hopeful would see more with switch from Wegovy.  Patient denies personal or family history of MEN Type2, MTC, Pancreatitis.     Nutrition/Eating Habits: working on  "getting 100g protein daily (difficult, but is sure to get more than 60 g). Found high protein yogurt. Following with Weight Watchers.   Coffee and smoothie in the a.m., lunch is small - crackers and cheese (sometimes forgets and has later in afternoon), dinner - working on protein and veg; drinking 3 x 32 oz water daily.    Exercise/Activity: had gotten back into more activity and movement for a while, now working to increase this    History (from Medication Therapy Management visit 5/1/24):   She feels Wegovy has helped somewhat reduce food noise, but frustrated in weight loss plateau. Has been on Wegovy for over 2 years and has recently been working on ways to improve its efficacy including joining Weight Watchers and changing workout patterns. This has only been mildly effective.     Initial weight before GLP1/GIP (4/8/2022): 213 lb (BMI 43.02 kg/m2)        Current weight today: 192 lbs 0 oz  Cumulative Weight Loss: -21 lb, -9.6% from baseline    Wt Readings from Last 4 Encounters:   08/13/24 87.1 kg (192 lb)   05/01/24 88.9 kg (196 lb)   03/26/24 89.7 kg (197 lb 12.8 oz)   02/27/24 89 kg (196 lb 3.2 oz)     Estimated body mass index is 38.78 kg/m  as calculated from the following:    Height as of this encounter: 1.499 m (4' 11\").    Weight as of this encounter: 87.1 kg (192 lb).    Today's Vitals: Ht 1.499 m (4' 11\")   Wt 87.1 kg (192 lb)   BMI 38.78 kg/m    ----------------      I spent 20 minutes with this patient today. All changes were made via collaborative practice agreement with Leslie Dillon PA-C. A copy of the visit note was provided to the patient's provider(s).    A summary of these recommendations was sent via [employer].    Marlene Bernal, Pharm D., MPH    Medication Therapy Management Pharmacist   [facility]      Telemedicine Visit Details  Type of service:  Video Conference via [employer]  Start Time:  2:33 PM  End Time:  2:53 PM     Medication Therapy " Recommendations  Obesity, Class III, BMI 40-49.9 (morbid obesity) (H)    Current Medication: tirzepatide-Weight Management (ZEPBOUND) 5 MG/0.5ML prefilled pen   Rationale: Dose too low - Dosage too low - Effectiveness   Recommendation: Increase Dose   Status: Accepted per CPA

## 2024-08-13 NOTE — NURSING NOTE
Current patient location: [address] [place] [phone]    Is the patient currently in the state of [place]? YES    Visit mode:VIDEO    If the visit is dropped, the patient can be reconnected by: VIDEO VISIT: Text to cell phone:   Telephone Information:   Mobile [phone]       Will anyone else be joining the visit? NO  (If patient encounters technical issues they should call [phone])    How would you like to obtain your AVS? MyChart    Are changes needed to the allergy or medication list? No    Are refills needed on medications prescribed by this physician? NO    Rooming Documentation:  Not applicable      Reason for visit: RECHECK    Mohsen TOMLIN

## 2024-08-13 NOTE — Clinical Note
Niels Rivera,  I met with our shared patient  today with the [facility] per UMR/MHFV insurance requirements to manage GLP1/GIP treatment for obesity. As you may know, patient plans to continue to follow with you to manage obesity and is required to see me for refills of the GLP1/GIP treatment. 2 items to clarify for these insurance requirements:  Patient needs to see you at least every 6 months  If you need a dose adjustment or refill of the GLP1/GIP therapy, please send to me as the prescription must come from our clinic Medication Therapy Management team  You may have been aware of these requirements, but they are a bit cumbersome and I find a reminder somewhat helpful.  Let me know if you have any questions or concerns, Marlene Bernal, Pharm D., MPH  MTMPharmacist  [facility]

## 2024-08-13 NOTE — Clinical Note
RAMAN only, Leslie. Patient has PCP in [place]. Per UMR/MHFV insurance requirements - used our CPA today.   Increased Zepbound and working on lifestyle mods.  fidencio

## 2024-08-23 NOTE — PATIENT INSTRUCTIONS
"Recommendations from MTM Pharmacist visit:                                                    MTM (medication therapy management) is a service provided by a clinical pharmacist designed to help you get the most of out of your medicines.  You may be sent a phone or email survey evaluating today's visit.  Please provide feedback you have for the service he received today if you are able.    Increase Zepbound to 7.5 mg weekly.     To help with tolerability and effectiveness of Zepbound  :  Eat small meals/snacks throughout the day (about every 2-4 hours)  Focus on getting protein (keep working on goal of 100g daily) in first with each meal and snack.   Drink plenty of water - goal 64 oz throughout the day  You may try Metamucil, Benefiber, or Citrucel to help feel more full (less nausea) and have softer, more consistent bowel movements.  To optimize weight management - work on incorporating resistance training/weight lifting to build muscle and improve overall metabolism of adipose tissue.      Follow-up: Oct  18 with Nicole Arndt PA-C for blood pressure and med check  11/18 with Marlene Bernal, [facility]           It was great speaking with you today.  I value your experience and would be very thankful for your time in providing feedback in our clinic survey. In the next few days, you may receive an email or text message from [employer] with a link to a survey related to your  clinical pharmacist.\"     To schedule another MTM appointment, please call the clinic directly ([facility] Phone Number: [phone] (schedules for [facility] and [facility] - providers, dietitians, health coaches) or you may call the MTM scheduling line at [phone] or toll-free at [phone].     My Clinical Pharmacist's contact information:                                                      Please feel free to contact me with any questions or concerns you have.      Marlene Bernal, Pharm D., " MPH    Medication Therapy Management Pharmacist   [facility]          Meal Replacement Shake Options:   *Protein Shake Criteria: no more than 210 Calories, at least 20 grams of protein, and less than 10 grams of sugar   Premier Protein (160 Calories, 30 g protein)  Slim Fast Advanced Nutrition (180 Calories, 20 g protein)  Muscle Milk, lactose-free, 17 oz bottle (210 Calories, 30 g protein)  Integrated Supplements, no artificial sugars (110 Calories, 20 g protein)  Boost/Ensure Max (160 calories, 30 gm protein)   [facility] Protein Shakes (160-230 calories, 26-42 gm protein)  [facility] Protein Powder (180 calories, 30 gm protein)   Orgain Protein Shakes (130-160 calories, 20-26 gm protein)     Meal Replacement Bar Options:  Quest Protein Bars (190 Calories, 20 g protein)  Built Bar (170 Calories, 15-20 g protein)  One Protein Bar (210 calories, 20 g protein)  Prosser Signature Protein Bar ([employer]) (190 Calories, 21 g protein)  Pure Protein Bars (180 Calories, 21 g protein)    Low Calorie Frozen Meal:  Healthy Choice Power Bowls  Lean Cuisine  Smart Ones  Jose Crouch      ---------------------------------------------------------------  Tips to Increase the Protein in Your Diet  You may need more protein in your diet to help you heal from an illness, surgery or wound. Extra protein can also help you gain weight. Here are some ideas for adding high-protein foods to your meals.  Meat and fish  Add chopped cooked meat to vegetables, salads, casseroles, soups, sauces and biscuit dough.  Use in omelets, soufflés, quiches, sandwich fillings and chicken or turkey stuffing.  Wrap in pie crust or biscuit dough to make a turnover.  Add to stuffed baked potatoes.  Make a dip with diced meat or flaked fish mixed with sour cream and spices.  Chopped, hard-cooked eggs  Add to salads.  Use for snacks and sandwich filling.  High-protein milk  To make high-protein milk, mix 1  quart whole milk with 1 cup powdered milk.  Add to cream soups, mashed potatoes, scrambled eggs, cereals and dried eggnog mix.  Use as an ingredient in puddings, custards, hot chocolate, milk shakes and pancakes.  Powdered milk  If you don't have any high-protein milk on hand, you can use powdered milk. Add 3 tablespoons to:  gravies, sauces, cream soups, mayonnaise  casseroles, meat patties, meatloaf, tuna salad, deviled ham  scalloped or mashed potatoes, creamed spinach  scrambled eggs, egg salad  cereals  yogurt, milk drinks, ice cream, frozen desserts, puddings, custards.  Add 4 to 6 tablespoons powdered milk to make:  cream sauces  breads, muffins, pancakes, waffles, cookies, cakes  cream pies, frostings, cake fillings  fruit cobblers, bread or rice pudding, gelatin desserts.  For high-protein eggnog, add 3 to 6 tablespoons powdered milk to prepared eggnog.  Hard or soft cheese  Melt on sandwiches, breads, tortillas, hamburgers, hot dogs, other meats, vegetables, eggs and pies.  Grate into soups, chili, sauces, casseroles, vegetables, potatoes, rice, noodles or meatloaf.  Eat with toast or crackers, or melt for garrett dip.  Cottage cheese or ricotta cheese  Mix with or scoop on top of fruits and vegetables.  Add to casseroles, lasagna, spaghetti, noodles and egg dishes (omelets, scrambled eggs, soufflés).  Use in gelatin, pudding-type desserts, cheesecake and pancake batter.  Use to stuff crepes, pasta shells or manicotti.  Fruit yogurt  Blend with fruits for a fruit smoothie.  Use as a dip for fruits and vegetables.  Scoop on top of pancakes or waffles.  Tofu  Blend silken tofu with fruits and juices for a smoothie.  Add chunks of firm tofu to soups and stews, or crumble into meatloaf.  Blend dried onion soup mix into soft or silken tofu for dip.  Use pureed silken tofu for part of the mayonnaise, sour cream, cream cheese or ricotta cheese called for in recipes.  Beans  Use cooked beans or peas in soups,  casseroles, pasta, tacos and burritos.  Nuts and seeds  Note: For children under 3, discuss with the child's care team.  Use in casseroles, breads, muffins, pancakes, cookies and waffles.  Sprinkle on fruits, cereals, ice cream, yogurt, vegetables and salads.  Mix with raisins, dried fruits and chocolate chips for a snack.  Nut butters  Note: For children under 3, discuss with the child's care team.  Spread on sandwiches, toast, muffins, crackers, waffles, pancakes and fruit slices.  Use as a dip for raw vegetables.  Blend with milk drinks, or swirl through ice cream, yogurt or hot cereal.  Nutrition supplements (nutrition bars, drinks and powders)  Add powders to milk drinks and desserts.  Mix with ice cream, milk and fruit for a high-protein milk shake.    For informational purposes only. Not to replace the advice of your health care provider. Clinically reviewed by Adeola Beltrán RD, MARCOS, and the Clinical Nutrition Service Line. Copyright   2005 [facility]. All rights reserved. [employer] 738199 - REV 04/24.      -----------------------------------------------------------------------------------------------------------------  Learning About High-Protein Foods  What foods are high in protein?     The foods you eat contain nutrients, such as vitamins and minerals. Protein is a nutrient. Your body needs the right amount to stay healthy and work as it should. You can use the list below to help you make choices about which foods to eat.  Here are some examples of foods that are high in protein.  Dairy and dairy alternatives  Cheese  Milk  Soy milk  Yogurt (especially Greek)  Meat  Beef  Chicken  Ham  Lamb  Lunch meat  Pork  Sausage  Turkey  Other protein foods  Beans (black, garbanzo, kidney, lima)  Eggs  Hummus  Lentils  Nuts  Peanut butter and other nut butters  Peas  Soybeans  Tofu  Veggie or soy belgica (Check the nutrition label for the amount of protein in each  "serving.)  Seafood  Anchovies  Cod  Crab  Halibut  [place]  Sardines  Shrimp  Tilapia  [place]  Tuna  Protein supplements  Bars (Check the nutrition label for the amount of protein in each serving.)  Drinks  Powders  Work with your doctor to find out how much of this nutrient you need. Depending on your health, you may need more or less of it in your diet.  Where can you learn more?  Go to https://www.[employer].net/patiented  Enter P335 in the search box to learn more about \"Learning About High-Protein Foods.\"  Current as of: September 20, 2023               Content Version: 14.0    2466-3798 [employer]   Care instructions adapted under license by your healthcare professional. If you have questions about a medical condition or this instruction, always ask your healthcare professional. [employer] disclaims any warranty or liability for your use of this information.         "

## 2024-09-10 ENCOUNTER — MYC REFILL (OUTPATIENT)
Dept: CARDIOLOGY | Facility: CLINIC | Age: 44
End: 2024-09-10
Payer: COMMERCIAL

## 2024-09-10 DIAGNOSIS — E66.01 OBESITY, CLASS III, BMI 40-49.9 (MORBID OBESITY) (H): ICD-10-CM

## 2024-09-10 NOTE — TELEPHONE ENCOUNTER
Sent refills for Zepbound 7.5 mg weekly per cpa with VASU Guallpa, Pharm D., MPH    Medication Therapy Management Pharmacist   [facility]

## 2024-10-17 ASSESSMENT — PATIENT HEALTH QUESTIONNAIRE - PHQ9
SUM OF ALL RESPONSES TO PHQ QUESTIONS 1-9: 0
SUM OF ALL RESPONSES TO PHQ QUESTIONS 1-9: 0
10. IF YOU CHECKED OFF ANY PROBLEMS, HOW DIFFICULT HAVE THESE PROBLEMS MADE IT FOR YOU TO DO YOUR WORK, TAKE CARE OF THINGS AT HOME, OR GET ALONG WITH OTHER PEOPLE: NOT DIFFICULT AT ALL

## 2024-10-18 ENCOUNTER — OFFICE VISIT (OUTPATIENT)
Dept: FAMILY MEDICINE | Facility: CLINIC | Age: 44
End: 2024-10-18
Payer: COMMERCIAL

## 2024-10-18 VITALS
TEMPERATURE: 98.3 F | HEART RATE: 110 BPM | WEIGHT: 189 LBS | DIASTOLIC BLOOD PRESSURE: 82 MMHG | SYSTOLIC BLOOD PRESSURE: 126 MMHG | BODY MASS INDEX: 38.17 KG/M2 | OXYGEN SATURATION: 99 %

## 2024-10-18 DIAGNOSIS — E66.812 CLASS 2 SEVERE OBESITY WITH SERIOUS COMORBIDITY AND BODY MASS INDEX (BMI) OF 38.0 TO 38.9 IN ADULT, UNSPECIFIED OBESITY TYPE (H): Primary | ICD-10-CM

## 2024-10-18 DIAGNOSIS — Z23 HIGH PRIORITY FOR 2019-NCOV VACCINE: ICD-10-CM

## 2024-10-18 DIAGNOSIS — E66.01 CLASS 2 SEVERE OBESITY WITH SERIOUS COMORBIDITY AND BODY MASS INDEX (BMI) OF 38.0 TO 38.9 IN ADULT, UNSPECIFIED OBESITY TYPE (H): Primary | ICD-10-CM

## 2024-10-18 PROCEDURE — 99213 OFFICE O/P EST LOW 20 MIN: CPT | Performed by: PHYSICIAN ASSISTANT

## 2024-10-18 PROCEDURE — 91320 SARSCV2 VAC 30MCG TRS-SUC IM: CPT | Performed by: PHYSICIAN ASSISTANT

## 2024-10-18 PROCEDURE — 90480 ADMN SARSCOV2 VAC 1/ONLY CMP: CPT | Performed by: PHYSICIAN ASSISTANT

## 2024-10-18 NOTE — PROGRESS NOTES
"  Assessment & Plan       ICD-10-CM    1. Class 2 severe obesity with serious comorbidity and body mass index (BMI) of 38.0 to 38.9 in adult, unspecified obesity type (H)  E66.812     Z68.38     E66.01       2. High priority for 2019-nCoV vaccine  Z23         Successful weight loss with mounjaro  BP improved  Has follow up with MTM scheduled    Unfortunately medication will no longer be covered with insurance after the first of the year. Can discuss other options either with me or MTM if desired          BMI  Estimated body mass index is 38.17 kg/m  as calculated from the following:    Height as of 8/13/24: 1.499 m (4' 11\").    Weight as of this encounter: 85.7 kg (189 lb).     Subjective   See is a 43 year old, presenting for the following health issues:  Recheck Medication        10/18/2024    10:08 AM   Additional Questions   Roomed by SARATH Alberts     History of Present Illness       Reason for visit:  Med check with zepbound    She eats 0-1 servings of fruits and vegetables daily.She consumes 0 sweetened beverage(s) daily.She exercises with enough effort to increase her heart rate 10 to 19 minutes per day.  She exercises with enough effort to increase her heart rate 3 or less days per week.   She is taking medications regularly.         Medication Followup of Zepbound 7.5 mg/ 0.5 ml   Taking Medication as prescribed: yes  Side Effects:  None  Medication Helping Symptoms:  yes      Doing well on the zepbound but just found out that [employer] will not longer be covering it at the start of the year even through the MTM program  She does have an appt with MTM in a couple of weeks and is hoping to go up to the next dose until the new year  At that time may come back and discuss other options  Down to her weight prior to her  passing so is happy about that but would like to lose a little more  She did join weight watchers and they do offer a compounded semaglutide but it costs over a 100/month and she is not " really wanting to spend that much    On norethindrone for  her irregular/heavy periods  States the first month on it she was really sad and depressed.   That has improved and is feeling back to herself  Also no longer getting her period        Review of Systems  Remainder of ROS obtained and found to be negative other than that which was documented above        Objective    /82   Pulse 110   Temp 98.3  F (36.8  C) (Tympanic)   Wt 85.7 kg (189 lb)   SpO2 99%   BMI 38.17 kg/m    Body mass index is 38.17 kg/m .  Physical Exam   GENERAL: alert and no distress  CV: regular rate and rhythm, normal S1 S2, no S3 or S4, no murmur, click or rub, no peripheral edema   PSYCH: mentation appears normal, affect normal/bright            Signed Electronically by: Nicole Arndt PA-C

## 2024-10-21 ENCOUNTER — TELEPHONE (OUTPATIENT)
Dept: ENDOCRINOLOGY | Facility: CLINIC | Age: 44
End: 2024-10-21
Payer: COMMERCIAL

## 2024-10-21 NOTE — TELEPHONE ENCOUNTER
Tried to submit PA renewal but [employer] requires current PA to have . New reminder set to re-submit when current PA has

## 2024-11-05 NOTE — TELEPHONE ENCOUNTER
PA Initiation    Medication: ZEPBOUND 7.5 MG/0.5ML SC SOAJ  Insurance Company: [employer] - Phone [phone] Fax [phone]  Pharmacy Filling the Rx: [facility] - [place], [place] - [facility] [address]  Filling Pharmacy Phone: [phone]  Filling Pharmacy Fax: [phone]  Start Date: 10/21/2024     Key: [id number]

## 2024-11-05 NOTE — TELEPHONE ENCOUNTER
Prior Authorization Approval    Medication: ZEPBOUND 7.5 MG/0.5ML SC SOAJ  Authorization Effective Date: 11/5/2024  Authorization Expiration Date: 12/31/2024  Approved Dose/Quantity: uud  Reference #: Key: [id number]   Insurance Company: [employer] - Phone [phone] Fax [phone]  Expected CoPay: $    CoPay Card Available:      Financial Assistance Needed:    Which Pharmacy is filling the prescription: [facility] - [place], [place] - [facility] [address]   Pharmacy Notified: Yes  Patient Notified:  Yes

## 2024-11-15 ENCOUNTER — OFFICE VISIT (OUTPATIENT)
Dept: FAMILY MEDICINE | Facility: CLINIC | Age: 44
End: 2024-11-15
Payer: COMMERCIAL

## 2024-11-15 VITALS
HEIGHT: 59 IN | HEART RATE: 105 BPM | DIASTOLIC BLOOD PRESSURE: 80 MMHG | BODY MASS INDEX: 37.29 KG/M2 | RESPIRATION RATE: 16 BRPM | OXYGEN SATURATION: 98 % | SYSTOLIC BLOOD PRESSURE: 128 MMHG | WEIGHT: 185 LBS | TEMPERATURE: 99.1 F

## 2024-11-15 DIAGNOSIS — J01.90 ACUTE SINUSITIS WITH SYMPTOMS > 10 DAYS: Primary | ICD-10-CM

## 2024-11-15 DIAGNOSIS — J30.2 SEASONAL ALLERGIC RHINITIS, UNSPECIFIED TRIGGER: ICD-10-CM

## 2024-11-15 PROCEDURE — 99213 OFFICE O/P EST LOW 20 MIN: CPT | Performed by: NURSE PRACTITIONER

## 2024-11-15 RX ORDER — DOXYCYCLINE 100 MG/1
100 CAPSULE ORAL 2 TIMES DAILY
Qty: 14 CAPSULE | Refills: 0 | Status: SHIPPED | OUTPATIENT
Start: 2024-11-15 | End: 2024-11-22

## 2024-11-15 RX ORDER — FLUTICASONE PROPIONATE 50 MCG
SPRAY, SUSPENSION (ML) NASAL
Qty: 16 G | Refills: 3 | Status: SHIPPED | OUTPATIENT
Start: 2024-11-15

## 2024-11-15 ASSESSMENT — PAIN SCALES - GENERAL: PAINLEVEL_OUTOF10: MILD PAIN (2)

## 2024-11-15 NOTE — PROGRESS NOTES
"  Assessment & Plan     Acute sinusitis with symptoms > 10 days  Doxycycline has worked for her in the past.  Prescription given she has an allergy to amoxicillin.  - doxycycline hyclate (VIBRAMYCIN) 100 MG capsule; Take 1 capsule (100 mg) by mouth 2 times daily for 7 days.  Recommend that she continue with Mucinex and Natalie Saldana if able.    Seasonal allergic rhinitis, unspecified trigger  - fluticasone (FLONASE) 50 MCG/ACT nasal spray; USE 1-2 SPRAYS INTO BOTH NOSTRILS ONCE DAILY          BMI  Estimated body mass index is 37.37 kg/m  as calculated from the following:    Height as of this encounter: 1.499 m (4' 11\").    Weight as of this encounter: 83.9 kg (185 lb).         FUTURE APPOINTMENTS:       - Follow-up for annual visit or as needed    Subjective   See is a 43 year old, presenting for the following health issues:  Ear Problem    History of Present Illness       Reason for visit:  Mild fever and heart rate of 100-120  Symptom onset:  1-3 days ago  Symptoms include:  I feel I have no symptoms. just the mild fever and heart rate. a little bit of ear pain today.  Symptom intensity:  Mild  Symptom progression:  Staying the same  Had these symptoms before:  No  What makes it worse:  No. just stop taking sudafed  What makes it better:  Tylenol and ibuprofen   She is taking medications regularly.       Patient has a several week history of sinus symptoms.  Improved for a couple of days and then worsened again.  She now has fevers, facial pain, earache, headache.  She does have a history of sinus infections in the past.  Has been using Mucinex, Sudafed and ibuprofen out this past week     Sunday mild cough, felt hot on Monday 100 temperature, ibuprofen sudafed,   Fast heart rate on Tuesday.   Sore throat, Muscinex,  Facial pain, ear ache, headache a few days    Wednesday covid negative  Influenza and covid vaccine.     Review of Systems  Constitutional, HEENT, cardiovascular, pulmonary, gi and gu systems are " "negative, except as otherwise noted.      Objective    /80 (BP Location: Right arm, Patient Position: Sitting, Cuff Size: Adult Regular)   Pulse 105   Temp 99.1  F (37.3  C) (Tympanic)   Resp 16   Ht 1.499 m (4' 11\")   Wt 83.9 kg (185 lb)   SpO2 98%   BMI 37.37 kg/m    Body mass index is 37.37 kg/m .  Physical Exam  Constitutional:       Appearance: Normal appearance.   HENT:      Head: Normocephalic.      Right Ear: Tympanic membrane, ear canal and external ear normal.      Left Ear: Tympanic membrane, ear canal and external ear normal.      Nose:      Right Sinus: Maxillary sinus tenderness present.      Left Sinus: Maxillary sinus tenderness present.      Mouth/Throat:      Mouth: Mucous membranes are moist.      Pharynx: Oropharynx is clear.   Cardiovascular:      Rate and Rhythm: Normal rate and regular rhythm.      Heart sounds: Normal heart sounds.   Pulmonary:      Effort: Pulmonary effort is normal.      Breath sounds: Normal breath sounds.   Abdominal:      General: Bowel sounds are normal.      Palpations: Abdomen is soft.   Musculoskeletal:      Cervical back: Normal range of motion.   Lymphadenopathy:      Cervical: No cervical adenopathy.   Skin:     General: Skin is warm and dry.   Neurological:      Mental Status: She is alert and oriented to person, place, and time.   Psychiatric:         Mood and Affect: Mood normal.         Behavior: Behavior normal.         Thought Content: Thought content normal.         Judgment: Judgment normal.                Signed Electronically by: ALETHA Dumont CNP    "

## 2024-11-18 ENCOUNTER — VIRTUAL VISIT (OUTPATIENT)
Dept: PHARMACY | Facility: CLINIC | Age: 44
End: 2024-11-18
Attending: PHYSICIAN ASSISTANT
Payer: COMMERCIAL

## 2024-11-18 VITALS — BODY MASS INDEX: 36.89 KG/M2 | HEIGHT: 59 IN | WEIGHT: 183 LBS

## 2024-11-18 DIAGNOSIS — E66.01 OBESITY, CLASS III, BMI 40-49.9 (MORBID OBESITY) (H): ICD-10-CM

## 2024-11-18 ASSESSMENT — PAIN SCALES - GENERAL: PAINLEVEL_OUTOF10: NO PAIN (0)

## 2024-11-18 NOTE — PROGRESS NOTES
Medication Therapy Management (MTM) Encounter    ASSESSMENT:                            Medication Adherence/Access: education provided today of [facility] insurance requirements changing - stopping GLP1/GIP Agonist coverage for weight management in 2025.   Discussed options for continuing GLP1/GIP agonist in 2025.     Weight management :    Patient congratulated on >14 % total body weight loss and lifestyle modifications.     Due to [facility] insurance discontinuing coverage of GLP1 agonists for Weight Management in 2025 year, much of today's discussion was spent discussing next steps including alternative injectable options - paying out of pocket w/ savings card cost, compound product through [place] [facility], and others. Discussed risk/benefit of continuing high dose GLP1/GIP agonist  or tapering to lower risk of weight regain/cycling as suggested from some studies.   From this, patient wishing to  taper off Zepbound due to cost and pursue oral antiobesity medications in 2025 if needed . Discussed dietary and behavioral modifications to assist with goals. Patient would be candidate for metformin, naltrexone, topiramate for weight management in the future if GLP1/GIP agonist not affordable.           Approximate Comparative doses of GLP1s and GLP1/GIPs (DM=Diabetes indication, WL=Weight loss indication)     Liraglutide   (Victoza- DM)   (Saxenda-WL) Daily 0.6mg 1.2mg 1.8mg  2.4   mg 3 mg             Exenatide   (Bydreon-DM) Weekly     2mg                 Dulaglutide   (Trulicity-DM) Weekly   0.75mg 1.5mg   3 mg  4.5 mg          Oral Semaglutide  (Rybelsus -DM) Daily 3mg 7mg 14mg                 Semaglutide  (Ozempic- DM) Weekly   0.25mg 0.5mg    1mg   2mg        Semaglutide  (Compounded) Weekly  0.25mg 0.5mg   1mg 1.5mg 2mg 2.5mg     Semaglutide   (Wegovy-WL) Weekly   0.25mg 0.5mg    1mg 1.7mg  2.4mg       Tirzeptatide  (Mounjaro-DM)  (Zepbound-WL) Weekly     2.5mg       5 mg 7.5mg 10  mg 12.5 15 mg       Recommend when transitioning from one GLP-1 agonist to another, to consider step down by 1 dose when transitioning to decrease risk of gastrointestinal side effects.       PLAN:                            Finish supply of Zepbound 7.5mg - you may fill one more 30 day (1 month) supply if you want, but if you are worried about [facility] running into shortage situation, may fill one 90 day (3 month) supply Zepbound 5mg now (or end of December if you fill Zepboud 7.5 mg now) to get you into 2025.    To help with tolerability and effectiveness of Zepbound :  Eat 3 meals with protein focus daily to help with nausea. If you forget to eat, you may feel nausea as a hunger cue.  Focus on getting protein in first with each meal and snack.   A good starting goal is 60 g protein daily (track this, especially if at weight loss plateau). Once you consistently are getting 60g daily, try getting 90 g protein daily.  Drink plenty of water - goal 64 oz throughout the day  You may try Metamucil, Benefiber, or Citrucel to help feel more full (less nausea) and have softer, more consistent bowel movements.  To optimize weight management - work on incorporating resistance training/weight lifting to build muscle and improve overall metabolism of adipose tissue.    Options for continuing GLP1/GIP agonist in 2025:  Pay out of pocket for Wegovy or Zepbound pens (>$1000/month cash price without savings card)   Zepbound cost with Zepbound savings card (link below to sign up for this): $650/month with card  [email or web address]  Wegovy cost with Wegovy savings card (link below to sign up for this): $650/month with card   https://www.Windtronics/coverage-and-savings/save-on-wegovy.html  Compounded semaglutide (same active ingredient as Wegovy) from [facility] ($230/month for doses of 1mg or less; $370/month for doses higher than  1mg)  This is an available option for as long as Wegovy is on FDA shortage list, Wegovy has been on the list for the last several months with no foreseeable end in sight as of now   Zepbound Vials through [facility] - vials only available in 2.5 mg and 5 mg doses  $399/month for 2.5mg vials  $549/month for 5mg vials   $5 per month for administrations supplies (syringe/needles, etc)     Follow-up: in new year with Nicole Arndt PA-C and Karuna Garcia - [facility] for weight management.      You may talk to your PCP about referral to [facility] if you would like to re-establish with me. I would be happy to have you back and have you follow with one of our amazing weight management providers to support your PCP and you as well!      SUBJECTIVE/OBJECTIVE:                          See Roshan is a 43 year old female seen for a follow-up visit.       Reason for visit: weight management [facility] insurance requirements Medication Therapy Management GLP1/GIP Agonist Management     Allergies/ADRs: Reviewed in chart  Past Medical History: Reviewed in chart  Tobacco: She reports that she quit smoking about 19 years ago. Her smoking use included cigarettes. She started smoking about 29 years ago. She has a 5 pack-year smoking history. She has never been exposed to tobacco smoke. She has never used smokeless tobacco.  Alcohol: not currently using  Caffeine: coffee most days per week    Medication Adherence/Access: Medication barriers: obtaining medication from insurance.     Weight Management :   Zepbound 7.5mg weekly x 2 months       Patient managing with PCP - Nicole Arndt PA-C at [place]; also has met with Karuna Abernathy who specializes in weight management and patient may continue with her as well -has discussed oral antiobesity medications with her as well.    Having some weight loss plateau.   Patient reports no current medication side effects.  Plan currently is to  "stop Zepbound in  due to insurance changes as has met first goal of weight loss to goal of weight before  ; would like to maintain this and work on non-med assisted weight management to further reduce weight.    Nutrition/Eating Habits: meal prep for 2-3 meals and eating a meal for breakfast instead of smoothie - 100g protein daily. Doing WW now. May try getting Wegovy   drinking 9-12 x 8oz water daily.    Exercise/Activity: has been adding strength training and this has helped somewhat with weight loss     History (from Medication Therapy Management visit 24):   She feels Wegovy has helped somewhat reduce food noise, but frustrated in weight loss plateau. Has been on Wegovy for over 2 years and has recently been working on ways to improve its efficacy including joining Weight [employer] and changing workout patterns. This has only been mildly effective.       Medication Considerations:  Phentermine: positive hypertension,  some anxiety at baseline; no cardiac history  Topiramate: No kidney stones, no fatigue or cognitive concerns  Naltrexone: No liver dysfunction, no chronic opiate use  Bupropion:  no seizure, positive hypertension,  some anxiety at baseline; no cardiac history  Metformin: no renal impairment  GLP1: Patient denies personal or family history of MEN Type2, MTC, Pancreatitis.     Initial weight before GLP1/GIP (2022): 213 lb (BMI 43.02 kg/m2)            Current weight today: 183 lbs 0 oz  Cumulative Weight Loss: -30 lb, -14% from baseline    Wt Readings from Last 4 Encounters:   24 183 lb (83 kg)   11/15/24 185 lb (83.9 kg)   10/18/24 189 lb (85.7 kg)   24 192 lb (87.1 kg)     Estimated body mass index is 36.96 kg/m  as calculated from the following:    Height as of this encounter: 4' 11\" (1.499 m).    Weight as of this encounter: 183 lb (83 kg).      Lab Results   Component Value Date    A1C 5.5 2024    GLC 87 2023     2023    POTASSIUM 5.4 (H) " "12/29/2023    TARSHA 9.3 12/29/2023    CHLORIDE 103 12/29/2023    CO2 22 12/29/2023    BUN 12.8 12/29/2023    CR 0.51 12/29/2023    GFRESTIMATED >90 12/29/2023    CHOL 136 12/29/2023    LDL 76 12/29/2023    HDL 46 (L) 12/29/2023    TRIG 70 12/29/2023    TSH 1.44 04/08/2022     Liver Function Studies -   Recent Labs   Lab Test 12/29/23  1017   PROTTOTAL 8.1   ALBUMIN 4.5   BILITOTAL 0.3   ALKPHOS 60   AST 37   ALT 23         Today's Vitals: Ht 4' 11\" (1.499 m)   Wt 183 lb (83 kg)   BMI 36.96 kg/m    ----------------      I spent 20 minutes with this patient today. All changes were made via collaborative practice agreement with Leslie Dillon. A copy of the visit note was provided to the patient's provider(s).    A summary of these recommendations was sent via CITIA.    .Marlene Bernal, Pharm D., MPH    Medication Therapy Management Pharmacist   [facility]        Telemedicine Visit Details  The patient's medications can be safely assessed via a telemedicine encounter.  Type of service:  Video Conference via [employer]axis  Originating Location (pt. Location): Home    Distant Location (provider location):  Off-site  Start Time:  2:00 PM  End Time: 2:20 PM     Medication Therapy Recommendations  Obesity (BMI 30-39.9)   1 Current Medication: tirzepatide-Weight Management (ZEPBOUND) 7.5 MG/0.5ML prefilled pen   Current Medication Sig: Inject 0.5 mLs (7.5 mg) subcutaneously every 7 days.   Rationale: Medication product not available - Adherence - Adherence   Recommendation: Provide Adherence Intervention   Status: Accepted per CPA   Identified Date: 11/18/2024 Completed Date: 11/18/2024            "

## 2024-11-18 NOTE — PATIENT INSTRUCTIONS
Recommendations from MTM Pharmacist visit:                                                    MTM (medication therapy management) is a service provided by a clinical pharmacist designed to help you get the most of out of your medicines.  You may be sent a phone or email survey evaluating today's visit.  Please provide feedback you have for the service he received today if you are able.    Finish supply of Zepbound 7.5mg - you may fill one more 30 day (1 month) supply if you want, but if you are worried about [facility] running into shortage situation, may fill one 90 day (3 month) supply Zepbound 5mg now (or end of December if you fill Zepboud 7.5 mg now) to get you into 2025.    To help with tolerability and effectiveness of Zepbound :  Eat 3 meals with protein focus daily to help with nausea. If you forget to eat, you may feel nausea as a hunger cue.  Focus on getting protein in first with each meal and snack.   A good starting goal is 60 g protein daily (track this, especially if at weight loss plateau). Once you consistently are getting 60g daily, try getting 90 g protein daily.  Drink plenty of water - goal 64 oz throughout the day  You may try Metamucil, Benefiber, or Citrucel to help feel more full (less nausea) and have softer, more consistent bowel movements.  To optimize weight management - work on incorporating resistance training/weight lifting to build muscle and improve overall metabolism of adipose tissue.    Options for continuing GLP1/GIP agonist in 2025:  Pay out of pocket for Wegovy or Zepbound pens (>$1000/month cash price without savings card)   Zepbound cost with Zepbound savings card (link below to sign up for this): $650/month with card  [email or web address]  Wegovy cost with Wegovy savings card (link below to sign up for this): $650/month with card  "  https://www.POSLavu.Jostle/coverage-and-savings/save-on-wegovy.html  Compounded semaglutide (same active ingredient as Wegovy) from [facility] ($230/month for doses of 1mg or less; $370/month for doses higher than 1mg)  This is an available option for as long as Wegovy is on FDA shortage list, Wegovy has been on the list for the last several months with no foreseeable end in sight as of now   Zepbound Vials through [facility] - vials only available in 2.5 mg and 5 mg doses  $399/month for 2.5mg vials  $549/month for 5mg vials   $5 per month for administrations supplies (syringe/needles, etc)     Follow-up: in new year with Nicole Arndt PA-C and Karuna Mota for weight management.      You may talk to your PCP about referral to [facility] if you would like to re-establish with me. I would be happy to have you back and have you follow with one of our amazing weight management providers to support your PCP and you as well!        It was great speaking with you today.  I value your experience and would be very thankful for your time in providing feedback in our clinic survey. In the next few days, you may receive an email or text message from [employer] with a link to a survey related to your  clinical pharmacist.\"     To schedule another MTM appointment, please call the clinic directly ([facility] Phone Number: [phone] (schedules for [facility] and [facility] - providers, dietitians, health coaches) or you may call the MTM scheduling line at [phone] or toll-free at [phone].     My Clinical Pharmacist's contact information:                                                      Please feel free to contact me with any questions or concerns you have.      Marlene Bernal, Pharm D., MPH    Medication Therapy Management Pharmacist   [facility] " [facility]          Meal Replacement Shake Options:   *Protein Shake Criteria: no more than 210 Calories, at least 20 grams of protein, and less than 10 grams of sugar   Premier Protein (160 Calories, 30 g protein)  Slim Fast Advanced Nutrition (180 Calories, 20 g protein)  Muscle Milk, lactose-free, 17 oz bottle (210 Calories, 30 g protein)  Integrated Supplements, no artificial sugars (110 Calories, 20 g protein)  Boost/Ensure Max (160 calories, 30 gm protein)   Fairlife Protein Shakes (160-230 calories, 26-42 gm protein)  Aldi's [facility] Protein Powder (180 calories, 30 gm protein)   Orgain Protein Shakes (130-160 calories, 20-26 gm protein)     Meal Replacement Bar Options:  Quest Protein Bars (190 Calories, 20 g protein)  Built Bar (170 Calories, 15-20 g protein)  One Protein Bar (210 calories, 20 g protein)  Chan Signature Protein Bar ([employer]co) (190 Calories, 21 g protein)  Pure Protein Bars (180 Calories, 21 g protein)    Low Calorie Frozen Meal:  Healthy Choice Power Bowls  Alvarez Farley  Smart Ones  Jose Crouch      ---------------------------------------------------------------  Tips to Increase the Protein in Your Diet  You may need more protein in your diet to help you heal from an illness, surgery or wound. Extra protein can also help you gain weight. Here are some ideas for adding high-protein foods to your meals.  Meat and fish  Add chopped cooked meat to vegetables, salads, casseroles, soups, sauces and biscuit dough.  Use in omelets, soufflés, quiches, sandwich fillings and chicken or turkey stuffing.  Wrap in pie crust or biscuit dough to make a turnover.  Add to stuffed baked potatoes.  Make a dip with diced meat or flaked fish mixed with sour cream and spices.  Chopped, hard-cooked eggs  Add to salads.  Use for snacks and sandwich filling.  High-protein milk  To make high-protein milk, mix 1 quart whole milk with 1 cup powdered milk.  Add to cream soups, mashed potatoes, scrambled eggs,  cereals and dried eggnog mix.  Use as an ingredient in puddings, custards, hot chocolate, milk shakes and pancakes.  Powdered milk  If you don't have any high-protein milk on hand, you can use powdered milk. Add 3 tablespoons to:  gravies, sauces, cream soups, mayonnaise  casseroles, meat patties, meatloaf, tuna salad, deviled ham  scalloped or mashed potatoes, creamed spinach  scrambled eggs, egg salad  cereals  yogurt, milk drinks, ice cream, frozen desserts, puddings, custards.  Add 4 to 6 tablespoons powdered milk to make:  cream sauces  breads, muffins, pancakes, waffles, cookies, cakes  cream pies, frostings, cake fillings  fruit cobblers, bread or rice pudding, gelatin desserts.  For high-protein eggnog, add 3 to 6 tablespoons powdered milk to prepared eggnog.  Hard or soft cheese  Melt on sandwiches, breads, tortillas, hamburgers, hot dogs, other meats, vegetables, eggs and pies.  Grate into soups, chili, sauces, casseroles, vegetables, potatoes, rice, noodles or meatloaf.  Eat with toast or crackers, or melt for garrett dip.  Cottage cheese or ricotta cheese  Mix with or scoop on top of fruits and vegetables.  Add to casseroles, lasagna, spaghetti, noodles and egg dishes (omelets, scrambled eggs, soufflés).  Use in gelatin, pudding-type desserts, cheesecake and pancake batter.  Use to stuff crepes, pasta shells or manicotti.  Fruit yogurt  Blend with fruits for a fruit smoothie.  Use as a dip for fruits and vegetables.  Scoop on top of pancakes or waffles.  Tofu  Blend silken tofu with fruits and juices for a smoothie.  Add chunks of firm tofu to soups and stews, or crumble into meatloaf.  Blend dried onion soup mix into soft or silken tofu for dip.  Use pureed silken tofu for part of the mayonnaise, sour cream, cream cheese or ricotta cheese called for in recipes.  Beans  Use cooked beans or peas in soups, casseroles, pasta, tacos and burritos.  Nuts and seeds  Note: For children under 3, discuss with the  child's care team.  Use in casseroles, breads, muffins, pancakes, cookies and waffles.  Sprinkle on fruits, cereals, ice cream, yogurt, vegetables and salads.  Mix with raisins, dried fruits and chocolate chips for a snack.  Nut butters  Note: For children under 3, discuss with the child's care team.  Spread on sandwiches, toast, muffins, crackers, waffles, pancakes and fruit slices.  Use as a dip for raw vegetables.  Blend with milk drinks, or swirl through ice cream, yogurt or hot cereal.  Nutrition supplements (nutrition bars, drinks and powders)  Add powders to milk drinks and desserts.  Mix with ice cream, milk and fruit for a high-protein milk shake.    For informational purposes only. Not to replace the advice of your health care provider. Clinically reviewed by Adeola Beltrán RD, MARCOS, and the Clinical Nutrition Service Line. Copyright   2005 [facility]. All rights reserved. Free-lance.ru 880957 - REV 04/24.      -----------------------------------------------------------------------------------------------------------------  Learning About High-Protein Foods  What foods are high in protein?     The foods you eat contain nutrients, such as vitamins and minerals. Protein is a nutrient. Your body needs the right amount to stay healthy and work as it should. You can use the list below to help you make choices about which foods to eat.  Here are some examples of foods that are high in protein.  Dairy and dairy alternatives  Cheese  Milk  Soy milk  Yogurt (especially Greek)  Meat  Beef  Chicken  Ham  Lamb  Lunch meat  Pork  Sausage  Turkey  Other protein foods  Beans (black, garbanzo, kidney, lima)  Eggs  Hummus  Lentils  Nuts  Peanut butter and other nut butters  Peas  Soybeans  Tofu  Veggie or soy belgica (Check the nutrition label for the amount of protein in each serving.)  Seafood  Anchovies  Cod  Crab  Halibut  Pevely  Sardines  Shrimp  Tilapia  Dora  Tuna  Protein supplements  Bars (Check  "the nutrition label for the amount of protein in each serving.)  Drinks  Powders  Work with your doctor to find out how much of this nutrient you need. Depending on your health, you may need more or less of it in your diet.  Where can you learn more?  Go to https://www.sim4tec.net/patiented  Enter P335 in the search box to learn more about \"Learning About High-Protein Foods.\"  Current as of: September 20, 2023               Content Version: 14.0    3544-5828 [employer].   Care instructions adapted under license by your healthcare professional. If you have questions about a medical condition or this instruction, always ask your healthcare professional. [employer] disclaims any warranty or liability for your use of this information.         "

## 2024-11-18 NOTE — Clinical Note
"Niels Rivera, Today I met with our patient regarding GLP1 management for weight . We have recently been informed that as of January 2025, [employer] employees will no longer have coverage for GLP1s for weight management.   Patients will likely transition back to you for weight management (vs sharing patients with Medication Therapy Management at [facility]). AND I want to be supportive with this change/transition. There certainly will be some frustrations with costs and \"what's next\". I did discuss compounded semaglutide and Zepbound vials through [facility] as 2 options that bring down cost slightly. These can be prescribed through your clinic, happy to help if needed with those initial orders.   There is no action needed at this time. Patient is aware of this change and elected to continue GLP1 for now.    Let me know if you have any questions or concerns, Marlene Bernal, PharmD   "

## 2024-11-18 NOTE — NURSING NOTE
Current patient location: [address] [place] [id number]    Is the patient currently in the state of [place]? YES    Visit mode:VIDEO    If the visit is dropped, the patient can be reconnected by:VIDEO VISIT: Text to cell phone:   Telephone Information:   Mobile [phone]       Will anyone else be joining the visit? NO  (If patient encounters technical issues they should call [phone]    Are changes needed to the allergy or medication list? No    Are refills needed on medications prescribed by this physician? NO    Rooming Documentation:  Questionnaire(s) not pre-assigned    Reason for visit: Medication Therapy Management    Sherron TOMLIN

## 2024-12-30 SDOH — HEALTH STABILITY: PHYSICAL HEALTH: ON AVERAGE, HOW MANY DAYS PER WEEK DO YOU ENGAGE IN MODERATE TO STRENUOUS EXERCISE (LIKE A BRISK WALK)?: 0 DAYS

## 2024-12-30 SDOH — HEALTH STABILITY: PHYSICAL HEALTH: ON AVERAGE, HOW MANY MINUTES DO YOU ENGAGE IN EXERCISE AT THIS LEVEL?: 0 MIN

## 2024-12-30 ASSESSMENT — SOCIAL DETERMINANTS OF HEALTH (SDOH): HOW OFTEN DO YOU GET TOGETHER WITH FRIENDS OR RELATIVES?: ONCE A WEEK

## 2024-12-31 ENCOUNTER — OFFICE VISIT (OUTPATIENT)
Dept: FAMILY MEDICINE | Facility: CLINIC | Age: 44
End: 2024-12-31
Payer: COMMERCIAL

## 2024-12-31 VITALS
TEMPERATURE: 98.1 F | SYSTOLIC BLOOD PRESSURE: 134 MMHG | HEART RATE: 104 BPM | DIASTOLIC BLOOD PRESSURE: 82 MMHG | HEIGHT: 59 IN | BODY MASS INDEX: 37.29 KG/M2 | WEIGHT: 185 LBS | OXYGEN SATURATION: 98 %

## 2024-12-31 DIAGNOSIS — N92.0 SPOTTING: ICD-10-CM

## 2024-12-31 DIAGNOSIS — Z00.00 ROUTINE GENERAL MEDICAL EXAMINATION AT A HEALTH CARE FACILITY: Primary | ICD-10-CM

## 2024-12-31 DIAGNOSIS — I10 PRIMARY HYPERTENSION: ICD-10-CM

## 2024-12-31 DIAGNOSIS — R51.9 ACUTE NONINTRACTABLE HEADACHE, UNSPECIFIED HEADACHE TYPE: ICD-10-CM

## 2024-12-31 LAB
ALBUMIN SERPL BCG-MCNC: 4.2 G/DL (ref 3.5–5.2)
ALP SERPL-CCNC: 80 U/L (ref 40–150)
ALT SERPL W P-5'-P-CCNC: 17 U/L (ref 0–50)
ANION GAP SERPL CALCULATED.3IONS-SCNC: 10 MMOL/L (ref 7–15)
AST SERPL W P-5'-P-CCNC: 16 U/L (ref 0–45)
BASOPHILS # BLD AUTO: 0 10E3/UL (ref 0–0.2)
BASOPHILS NFR BLD AUTO: 0 %
BILIRUB SERPL-MCNC: 0.3 MG/DL
BUN SERPL-MCNC: 14.7 MG/DL (ref 6–20)
CALCIUM SERPL-MCNC: 9.5 MG/DL (ref 8.8–10.4)
CHLORIDE SERPL-SCNC: 107 MMOL/L (ref 98–107)
CHOLEST SERPL-MCNC: 165 MG/DL
CREAT SERPL-MCNC: 0.46 MG/DL (ref 0.51–0.95)
EGFRCR SERPLBLD CKD-EPI 2021: >90 ML/MIN/1.73M2
EOSINOPHIL # BLD AUTO: 0.1 10E3/UL (ref 0–0.7)
EOSINOPHIL NFR BLD AUTO: 1 %
ERYTHROCYTE [DISTWIDTH] IN BLOOD BY AUTOMATED COUNT: 13.3 % (ref 10–15)
EST. AVERAGE GLUCOSE BLD GHB EST-MCNC: 111 MG/DL
FASTING STATUS PATIENT QL REPORTED: YES
FASTING STATUS PATIENT QL REPORTED: YES
FERRITIN SERPL-MCNC: 114 NG/ML (ref 6–175)
GLUCOSE SERPL-MCNC: 93 MG/DL (ref 70–99)
HBA1C MFR BLD: 5.5 % (ref 0–5.6)
HCO3 SERPL-SCNC: 23 MMOL/L (ref 22–29)
HCT VFR BLD AUTO: 45.3 % (ref 35–47)
HDLC SERPL-MCNC: 47 MG/DL
HGB BLD-MCNC: 14.8 G/DL (ref 11.7–15.7)
IMM GRANULOCYTES # BLD: 0 10E3/UL
IMM GRANULOCYTES NFR BLD: 0 %
LDLC SERPL CALC-MCNC: 97 MG/DL
LYMPHOCYTES # BLD AUTO: 2.1 10E3/UL (ref 0.8–5.3)
LYMPHOCYTES NFR BLD AUTO: 33 %
MCH RBC QN AUTO: 26.9 PG (ref 26.5–33)
MCHC RBC AUTO-ENTMCNC: 32.7 G/DL (ref 31.5–36.5)
MCV RBC AUTO: 82 FL (ref 78–100)
MONOCYTES # BLD AUTO: 0.4 10E3/UL (ref 0–1.3)
MONOCYTES NFR BLD AUTO: 7 %
NEUTROPHILS # BLD AUTO: 3.7 10E3/UL (ref 1.6–8.3)
NEUTROPHILS NFR BLD AUTO: 59 %
NONHDLC SERPL-MCNC: 118 MG/DL
PLATELET # BLD AUTO: 293 10E3/UL (ref 150–450)
POTASSIUM SERPL-SCNC: 4.4 MMOL/L (ref 3.4–5.3)
PROT SERPL-MCNC: 7.6 G/DL (ref 6.4–8.3)
RBC # BLD AUTO: 5.5 10E6/UL (ref 3.8–5.2)
SODIUM SERPL-SCNC: 140 MMOL/L (ref 135–145)
T4 FREE SERPL-MCNC: 1.84 NG/DL (ref 0.9–1.7)
TRIGL SERPL-MCNC: 106 MG/DL
TSH SERPL DL<=0.005 MIU/L-ACNC: <0.01 UIU/ML (ref 0.3–4.2)
WBC # BLD AUTO: 6.2 10E3/UL (ref 4–11)

## 2024-12-31 PROCEDURE — 36415 COLL VENOUS BLD VENIPUNCTURE: CPT | Performed by: PHYSICIAN ASSISTANT

## 2024-12-31 PROCEDURE — 84443 ASSAY THYROID STIM HORMONE: CPT | Performed by: PHYSICIAN ASSISTANT

## 2024-12-31 PROCEDURE — 84439 ASSAY OF FREE THYROXINE: CPT | Performed by: PHYSICIAN ASSISTANT

## 2024-12-31 PROCEDURE — 80061 LIPID PANEL: CPT | Performed by: PHYSICIAN ASSISTANT

## 2024-12-31 PROCEDURE — 82728 ASSAY OF FERRITIN: CPT | Performed by: PHYSICIAN ASSISTANT

## 2024-12-31 PROCEDURE — 83036 HEMOGLOBIN GLYCOSYLATED A1C: CPT | Performed by: PHYSICIAN ASSISTANT

## 2024-12-31 PROCEDURE — 85025 COMPLETE CBC W/AUTO DIFF WBC: CPT | Performed by: PHYSICIAN ASSISTANT

## 2024-12-31 PROCEDURE — 80053 COMPREHEN METABOLIC PANEL: CPT | Performed by: PHYSICIAN ASSISTANT

## 2024-12-31 PROCEDURE — 99396 PREV VISIT EST AGE 40-64: CPT | Performed by: PHYSICIAN ASSISTANT

## 2024-12-31 PROCEDURE — 99214 OFFICE O/P EST MOD 30 MIN: CPT | Mod: 25 | Performed by: PHYSICIAN ASSISTANT

## 2024-12-31 RX ORDER — LOSARTAN POTASSIUM 50 MG/1
50 TABLET ORAL DAILY
Qty: 90 TABLET | Refills: 3 | Status: SHIPPED | OUTPATIENT
Start: 2024-12-31

## 2024-12-31 NOTE — PATIENT INSTRUCTIONS
Patient Education   Preventive Care Advice   This is general advice given by our system to help you stay healthy. However, your care team may have specific advice just for you. Please talk to your care team about your preventive care needs.  Nutrition  Eat 5 or more servings of fruits and vegetables each day.  Try wheat bread, brown rice and whole grain pasta (instead of white bread, rice, and pasta).  Get enough calcium and vitamin D. Check the label on foods and aim for 100% of the RDA (recommended daily allowance).  Lifestyle  Exercise at least 150 minutes each week  (30 minutes a day, 5 days a week).  Do muscle strengthening activities 2 days a week. These help control your weight and prevent disease.  No smoking.  Wear sunscreen to prevent skin cancer.  Have a dental exam and cleaning every 6 months.  Yearly exams  See your health care team every year to talk about:  Any changes in your health.  Any medicines your care team has prescribed.  Preventive care, family planning, and ways to prevent chronic diseases.  Shots (vaccines)   HPV shots (up to age 26), if you've never had them before.  Hepatitis B shots (up to age 59), if you've never had them before.  COVID-19 shot: Get this shot when it's due.  Flu shot: Get a flu shot every year.  Tetanus shot: Get a tetanus shot every 10 years.  Pneumococcal, hepatitis A, and RSV shots: Ask your care team if you need these based on your risk.  Shingles shot (for age 50 and up)  General health tests  Diabetes screening:  Starting at age 35, Get screened for diabetes at least every 3 years.  If you are younger than age 35, ask your care team if you should be screened for diabetes.  Cholesterol test: At age 39, start having a cholesterol test every 5 years, or more often if advised.  Bone density scan (DEXA): At age 50, ask your care team if you should have this scan for osteoporosis (brittle bones).  Hepatitis C: Get tested at least once in your life.  STIs (sexually  transmitted infections)  Before age 24: Ask your care team if you should be screened for STIs.  After age 24: Get screened for STIs if you're at risk. You are at risk for STIs (including HIV) if:  You are sexually active with more than one person.  You don't use condoms every time.  You or a partner was diagnosed with a sexually transmitted infection.  If you are at risk for HIV, ask about PrEP medicine to prevent HIV.  Get tested for HIV at least once in your life, whether you are at risk for HIV or not.  Cancer screening tests  Cervical cancer screening: If you have a cervix, begin getting regular cervical cancer screening tests starting at age 21.  Breast cancer scan (mammogram): If you've ever had breasts, begin having regular mammograms starting at age 40. This is a scan to check for breast cancer.  Colon cancer screening: It is important to start screening for colon cancer at age 45.  Have a colonoscopy test every 10 years (or more often if you're at risk) Or, ask your provider about stool tests like a FIT test every year or Cologuard test every 3 years.  To learn more about your testing options, visit:   .  For help making a decision, visit:   https://bit.ly/vu36120.  Prostate cancer screening test: If you have a prostate, ask your care team if a prostate cancer screening test (PSA) at age 55 is right for you.  Lung cancer screening: If you are a current or former smoker ages 50 to 80, ask your care team if ongoing lung cancer screenings are right for you.  For informational purposes only. Not to replace the advice of your health care provider. Copyright   2023 [employer]. All rights reserved. Clinically reviewed by the [facility] Transitions Program. [employer] [id number] - REV 01/24.

## 2024-12-31 NOTE — PROGRESS NOTES
Preventive Care Visit  [facility] WESTON Arndt PA-C, Family Medicine  Dec 31, 2024    Assessment & Plan     (Z00.00) Routine general medical examination at a health care facility  (primary encounter diagnosis)  Comment:   Plan: Lipid panel reflex to direct LDL Non-fasting,         CBC with platelets and differential, Ferritin,         Hemoglobin A1c, Comprehensive metabolic panel         (BMP + Alb, Alk Phos, ALT, AST, Total. Bili,         TP), TSH with free T4 reflex            (I10) Primary hypertension  Comment: well controlled today and she has been able to check at home. No dose adjustment but will have her continue to keep an eye on things at home   Plan: losartan (COZAAR) 50 MG tablet, CBC with         platelets and differential, Comprehensive         metabolic panel (BMP + Alb, Alk Phos, ALT, AST,        Total. Bili, TP), TSH with free T4 reflex            (N92.0) Spotting  Comment: started 2 days after headache issue so wonder if they aren't related - perhaps stress of headache and not feeling well is causing some spotting  Plan: monitor for now - very light and spotting in general not that abnormal with the mini pill. Hopefully will go back to her normal once symptoms fully resolve    (R51.9) Acute nonintractable headache, unspecified headache type  Comment: acute onset, now improving although still 'sensitive' in the area. Not sure if there was a blood pressure component, anxiety component or all of it contributing. Headache is improving so at this point would favor getting labs and just watching closely but have a low threshold to consider head imaging if symptoms worsen or she has similar repeat episodes given this is new/more uncharacteristic for her  Plan: CBC with platelets and differential, Ferritin,         Comprehensive metabolic panel (BMP + Alb, Alk         Phos, ALT, AST, Total. Bili, TP), TSH with free        T4 reflex                Patient has been advised of split  "billing requirements and indicates understanding: Yes        BMI  Estimated body mass index is 37.37 kg/m  as calculated from the following:    Height as of this encounter: 1.499 m (4' 11\").    Weight as of this encounter: 83.9 kg (185 lb).       Counseling  Appropriate preventive services were addressed with this patient via screening, questionnaire, or discussion as appropriate for fall prevention, nutrition, physical activity, Tobacco-use cessation, social engagement, weight loss and cognition.  Checklist reviewing preventive services available has been given to the patient.  Reviewed patient's diet, addressing concerns and/or questions.           Subjective   See is a 44 year old, presenting for the following:  Physical        12/31/2024     9:08 AM   Additional Questions   Roomed by SARATH Alberts          HPI    -She has had a migraine for the last few days. She is wondering if it's because her blood pressure is high.   Thinks its a migraine  Very localized area in the back/posterior head on the right side  Very sensitive - could not even brush her hair at the onset. Now pain is better but skin still feels sensitive in the area to touch  Started on the 26th  Felt almost pulsatile - was throbbing fairly constantly with a more intense or sharp pain every so often  Talked to a friend/pharmacist who asked about her blood pressure - she was not checking at home but purchased cuff and her blood pressure the following morning was 155/94   The possibility of anxiety was also mentioned as she is at the 10 year anniversary of her 's unexpected passing. She notes she wasn't feeling anxious but took some hydroxyzine and did feel that dulled the headache or lowered her blood pressure or both  Has continued taking the hydroxyzine in the morning and then again at bedtime  Headache today is not present other than the pain/sensitivity if she touches that area    She had no changes to vision, speech, thought processing when " the headache was severe  Was not sensitive to light or sound  No nausea/vomiting    She will get headaches but very rarely and not one that lasts several days    She did try ibuprofen the first day (800mg) but didn't seem to help so didn't continue       -She is also spotting which is not normal.  When she first started the mini pill she was told she could get spotting but she never had any  Started spotting on the 28th   Very light but still spotting today        Health Care Directive  Patient does not have a Health Care Directive: Discussed advance care planning with patient; however, patient declined at this time.      12/30/2024   General Health   How would you rate your overall physical health? (!) FAIR   Feel stress (tense, anxious, or unable to sleep) Only a little   (!) STRESS CONCERN      12/30/2024   Nutrition   Three or more servings of calcium each day? (!) NO   Diet: Regular (no restrictions)   How many servings of fruit and vegetables per day? (!) 0-1   How many sweetened beverages each day? 0-1         12/30/2024   Exercise   Days per week of moderate/strenous exercise 0 days   Average minutes spent exercising at this level 0 min   (!) EXERCISE CONCERN      12/30/2024   Social Factors   Frequency of gathering with friends or relatives Once a week   Worry food won't last until get money to buy more No   Food not last or not have enough money for food? No   Do you have housing? (Housing is defined as stable permanent housing and does not include staying ouside in a car, in a tent, in an abandoned building, in an overnight shelter, or couch-surfing.) Yes   Are you worried about losing your housing? No   Lack of transportation? No   Unable to get utilities (heat,electricity)? No         12/30/2024   Dental   Dentist two times every year? Yes         12/30/2024   TB Screening   Were you born outside of the [place]? No                 12/30/2024   Substance Use   Alcohol more than 3/day or more than 7/wk No   Do  you use any other substances recreationally? No     Social History     Tobacco Use    Smoking status: Former     Current packs/day: 0.00     Average packs/day: 0.5 packs/day for 10.0 years (5.0 ttl pk-yrs)     Types: Cigarettes     Start date: 1995     Quit date: 2005     Years since quittin.0     Passive exposure: Never    Smokeless tobacco: Never   Vaping Use    Vaping status: Never Used   Substance Use Topics    Alcohol use: No    Drug use: No           3/25/2024   LAST FHS-7 RESULTS   1st degree relative breast or ovarian cancer No   Any relative bilateral breast cancer No   Any male have breast cancer No   Any ONE woman have BOTH breast AND ovarian cancer No   Any woman with breast cancer before 50yrs No   2 or more relatives with breast AND/OR ovarian cancer No   2 or more relatives with breast AND/OR bowel cancer No       Mammogram Screening - Mammogram every 1-2 years updated in Health Maintenance based on mutual decision making        2024   STI Screening   New sexual partner(s) since last STI/HIV test? No     History of abnormal Pap smear: No - age 30- 64 PAP with HPV every 5 years recommended        Latest Ref Rng & Units 2024     4:45 PM 2019     9:25 AM 2019     8:45 AM   PAP / HPV   PAP  Negative for Intraepithelial Lesion or Malignancy (NILM)      PAP (Historical)   NIL     HPV 16 DNA Negative Negative   Negative    HPV 18 DNA Negative Negative   Negative    Other HR HPV Negative Negative   Negative      ASCVD Risk   The 10-year ASCVD risk score (Maximilian KENNDEY, et al., 2019) is: 0.7%    Values used to calculate the score:      Age: 44 years      Sex: Female      Is Non- : No      Diabetic: No      Tobacco smoker: No      Systolic Blood Pressure: 134 mmHg      Is BP treated: Yes      HDL Cholesterol: 46 mg/dL      Total Cholesterol: 136 mg/dL        2024   Contraception/Family Planning   Questions about contraception or family  "planning No       Reviewed and updated as needed this visit by Provider                    BP Readings from Last 3 Encounters:   12/31/24 134/82   11/15/24 128/80   10/18/24 126/82    Wt Readings from Last 3 Encounters:   12/31/24 83.9 kg (185 lb)   11/18/24 83 kg (183 lb)   11/15/24 83.9 kg (185 lb)                      Review of Systems  Constitutional, HEENT, cardiovascular, pulmonary, GI, , musculoskeletal, neuro, skin, endocrine and psych systems are negative, except as otherwise noted.     Objective    Exam  /82   Pulse 104   Temp 98.1  F (36.7  C) (Tympanic)   Ht 1.499 m (4' 11\")   Wt 83.9 kg (185 lb)   SpO2 98%   BMI 37.37 kg/m     Estimated body mass index is 37.37 kg/m  as calculated from the following:    Height as of this encounter: 1.499 m (4' 11\").    Weight as of this encounter: 83.9 kg (185 lb).    Physical Exam  GENERAL: alert and no distress  EYES: Eyes grossly normal to inspection, PERRL and conjunctivae and sclerae normal  HENT: ear canals and TM's normal, nose and mouth without ulcers or lesions  NECK: no adenopathy, no asymmetry, masses, or scars  RESP: lungs clear to auscultation - no rales, rhonchi or wheezes  CV: regular rate and rhythm, normal S1 S2, no S3 or S4, no murmur, click or rub, no peripheral edema  ABDOMEN: soft, nontender, no hepatosplenomegaly, no masses and bowel sounds normal  MS: no gross musculoskeletal defects noted, no edema  SKIN: no suspicious lesions or rashes  NEURO: Normal strength and tone, mentation intact and speech normal  PSYCH: mentation appears normal, affect normal/bright        Signed Electronically by: Nicole Arndt PA-C    "

## 2025-01-07 ENCOUNTER — MYC MEDICAL ADVICE (OUTPATIENT)
Dept: FAMILY MEDICINE | Facility: CLINIC | Age: 45
End: 2025-01-07
Payer: COMMERCIAL

## 2025-01-07 DIAGNOSIS — E05.90 HYPERTHYROIDISM: Primary | ICD-10-CM

## 2025-01-13 ENCOUNTER — LAB (OUTPATIENT)
Dept: LAB | Facility: CLINIC | Age: 45
End: 2025-01-13
Payer: COMMERCIAL

## 2025-01-13 DIAGNOSIS — E05.90 HYPERTHYROIDISM: ICD-10-CM

## 2025-01-13 PROCEDURE — 86800 THYROGLOBULIN ANTIBODY: CPT

## 2025-01-13 PROCEDURE — 83520 IMMUNOASSAY QUANT NOS NONAB: CPT | Mod: 90

## 2025-01-13 PROCEDURE — 99000 SPECIMEN HANDLING OFFICE-LAB: CPT

## 2025-01-13 PROCEDURE — 86376 MICROSOMAL ANTIBODY EACH: CPT

## 2025-01-13 PROCEDURE — 36415 COLL VENOUS BLD VENIPUNCTURE: CPT

## 2025-01-14 LAB
THYROGLOB AB SERPL IA-ACNC: <20 IU/ML
THYROPEROXIDASE AB SERPL-ACNC: 46 IU/ML

## 2025-01-15 LAB — TSH RECEP AB SER-ACNC: 3.38 IU/L (ref 0–1.75)

## 2025-01-20 ENCOUNTER — ANCILLARY PROCEDURE (OUTPATIENT)
Dept: ULTRASOUND IMAGING | Facility: CLINIC | Age: 45
End: 2025-01-20
Attending: PHYSICIAN ASSISTANT
Payer: COMMERCIAL

## 2025-01-20 DIAGNOSIS — E05.90 HYPERTHYROIDISM: ICD-10-CM

## 2025-01-20 PROCEDURE — 76536 US EXAM OF HEAD AND NECK: CPT | Mod: TC | Performed by: RADIOLOGY

## 2025-01-23 DIAGNOSIS — E04.1 THYROID NODULE: Primary | ICD-10-CM

## 2025-01-31 ENCOUNTER — HOSPITAL ENCOUNTER (OUTPATIENT)
Dept: ULTRASOUND IMAGING | Facility: CLINIC | Age: 45
Discharge: HOME | End: 2025-01-31
Attending: PHYSICIAN ASSISTANT | Admitting: PHYSICIAN ASSISTANT
Payer: COMMERCIAL

## 2025-01-31 DIAGNOSIS — E04.1 THYROID NODULE: ICD-10-CM

## 2025-01-31 PROCEDURE — 250N000009 HC RX 250: Performed by: RADIOLOGY

## 2025-01-31 PROCEDURE — 272N000431 US BIOPSY THYROID FINE NEEDLE ASPIRATION

## 2025-01-31 PROCEDURE — 88173 CYTOPATH EVAL FNA REPORT: CPT | Mod: TC | Performed by: PHYSICIAN ASSISTANT

## 2025-01-31 RX ADMIN — LIDOCAINE HYDROCHLORIDE 5 ML: 10 INJECTION, SOLUTION EPIDURAL; INFILTRATION; INTRACAUDAL; PERINEURAL at 08:03

## 2025-02-03 LAB
PATH REPORT.COMMENTS IMP SPEC: NORMAL
PATH REPORT.COMMENTS IMP SPEC: NORMAL
PATH REPORT.FINAL DX SPEC: NORMAL
PATH REPORT.GROSS SPEC: NORMAL
PATH REPORT.MICROSCOPIC SPEC OTHER STN: NORMAL

## 2025-02-03 PROCEDURE — 88173 CYTOPATH EVAL FNA REPORT: CPT | Mod: 26 | Performed by: PATHOLOGY

## 2025-02-25 ENCOUNTER — MYC MEDICAL ADVICE (OUTPATIENT)
Dept: FAMILY MEDICINE | Facility: CLINIC | Age: 45
End: 2025-02-25
Payer: COMMERCIAL

## 2025-02-25 DIAGNOSIS — E05.90 HYPERTHYROIDISM: Primary | ICD-10-CM

## 2025-04-07 DIAGNOSIS — N93.9 ABNORMAL UTERINE BLEEDING (AUB): ICD-10-CM

## 2025-04-07 RX ORDER — NORETHINDRONE 5 MG/1
5 TABLET ORAL DAILY
Qty: 90 TABLET | Refills: 0 | Status: SHIPPED | OUTPATIENT
Start: 2025-04-07

## 2025-04-07 NOTE — TELEPHONE ENCOUNTER
Last Written Prescription Date:  3/26/2024  Last Fill Quantity: 90,  # refills: 3   Last office visit: 3/26/2024 ; last virtual visit: Visit date not found with prescribing provider: Dr. Pena   Future Office Visit:  not scheduled.        Requested Prescriptions   Pending Prescriptions Disp Refills    norethindrone (AYGESTIN) 5 MG tablet 90 tablet 3     Sig: Take 1 tablet (5 mg) by mouth daily.       There is no refill protocol information for this order        Medication is being filled for 1 time refill only due to:  Patient needs to be seen because it has been more than one year since last visit.    [facility] message sent.    Linda CHAVIRA RN   [facility]

## 2025-04-17 ENCOUNTER — E-CONSULT (OUTPATIENT)
Dept: ENDOCRINOLOGY | Facility: CLINIC | Age: 45
End: 2025-04-17
Payer: COMMERCIAL

## 2025-04-17 NOTE — PROGRESS NOTES
"    4/17/2025     E-Consult has been accepted.    Interprofessional consultation requested by:  Nicole Arndt PA-C      Clinical Question/Purpose: MY CLINICAL QUESTION IS: Hyperthyroid - new diagnosis. Initially no symptoms but more recently noticing a slight tremor, body aches, and faster heart rate. Besides a beta blocker, is there something else I can/should start her on until she can be seen (has an endo appt in June - earliest available)    Patient assessment and information reviewed:   Past Medical History:   Diagnosis Date    Hypertension      Patient is scheduled to see Dr Gonzalez in June 2025 5/7/11 TSH 2.13, free T4 1.04  4/8/22 weight 213, HR 81/ TSH 1.44  5/1/24 weight 196  11/15/24 \"acute sinusitis\"  12/31/24 weight 185, /minute, /82; TSH < 0.01, free T4 1.84 routine clinic appt reporting HA x days \"Very localized area in the back/posterior head on the right side Very sensitive - could not even brush her hair at the onset. Now pain is better but skin still feels sensitive in the area to touch\"  1/13/25 GEMA < 20, TPO 46, TRAB 3.38  3/25/25 TSH < 0.01,free T4 2.42     1/20/25 thyroid US:   Right superior thyroid nodule # 1 1.5 x 1.3 x 2.3 FNAB 1/31/25 \"benign\" (no description of cells)   Very Low doppler flow     Current Outpatient Medications   Medication Sig Dispense Refill    famotidine (PEPCID) 20 MG tablet TAKE 1 TABLET (20 MG) BY MOUTH 2 TIMES DAILY 180 tablet 3    fluticasone (FLONASE) 50 MCG/ACT nasal spray USE 1-2 SPRAYS INTO BOTH NOSTRILS ONCE DAILY 16 g 3    hydrOXYzine HCl (ATARAX) 25 MG tablet Take 1-2 tablets (25-50 mg) by mouth every 6 hours as needed for itching or anxiety 60 tablet 11    ibuprofen (ADVIL/MOTRIN) 800 MG tablet Take 1 tablet (800 mg) by mouth every 8 hours as needed for moderate pain 30 tablet 1    ketotifen (ZADITOR) 0.025 % ophthalmic solution PLACE 1 DROP INTO BOTH EYES TWO TIMES A DAY 5 mL 11    levocetirizine (XYZAL) 5 MG tablet Take 1 " tablet (5 mg) by mouth every evening 90 tablet 3    losartan (COZAAR) 50 MG tablet Take 1 tablet (50 mg) by mouth daily. 90 tablet 3    norethindrone (AYGESTIN) 5 MG tablet Take 1 tablet (5 mg) by mouth daily. 90 tablet 0    pseudoePHEDrine (SUDOGEST) 30 MG tablet TAKE 1-2 TABLETS (30-60 MG) BY MOUTH EVERY 4 HOURS AS NEEDED FOR CONGESTION 200 tablet 1    tirzepatide-Weight Management (ZEPBOUND) 5 MG/0.5ML prefilled pen Inject 0.5 mLs (5 mg) subcutaneously every 7 days. 6 mL 0    tirzepatide-Weight Management (ZEPBOUND) 7.5 MG/0.5ML prefilled pen Inject 0.5 mLs (7.5 mg) subcutaneously every 7 days. 2 mL 0     Thyrotoxicosis - Pattern of TFTS has gotten worse between 12/31/24 and 3/25/25.    etiology unclear.  Differential includes transient thyroiditis (supported by the low flow on Doppler) vs hyperthyroidism from Graves' disease (high TRAB noted 1/13/25)    Right thyroid nodule was read as benign follicular lesion.  Differential includes transient thyroiditis which will reverse with time.     Recommendations:   Labs now: TSH, free T4, total T3, thyroid stimulating immunoglobulin, CRP inflammation, sed rate.   Let me know results.  If it continues to look like Graves' we will get her in to the endocrine clinic sooner than the currently scheduled appt .    The recommendations provided in this E-Consult are based on a review of clinical data pertinent to the clinical question presented, without a review of the patient's complete medical record or, the benefit of a comprehensive in-person or virtual patient evaluation. This consultation should not replace the clinical judgement and evaluation of the provider ordering this E-Consult. Any new clinical issues, or changes in patient status since the filing of this E-Consult will need to be taken into account when assessing these recommendations. Please contact me if you have further questions.    My total time spent reviewing clinical information and formulating assessment  was 10 minutes.        Jenn Badillo MD

## 2025-05-06 ENCOUNTER — MYC MEDICAL ADVICE (OUTPATIENT)
Dept: FAMILY MEDICINE | Facility: CLINIC | Age: 45
End: 2025-05-06
Payer: COMMERCIAL

## 2025-05-06 NOTE — TELEPHONE ENCOUNTER
DX, Referring NOTES: Hyperthyroidism    For Cancer Patients: Need the original operative and surgical pathology reports and all imaging reports/images related to the disease (includes all thyroid US, nuclear thyroid and total body scans, PET scans, chest CT reports since prior to the diagnosis ).   APPT DATE: 5/7/2025   NOTES (FOR ALL VISITS) STATUS DETAILS   OFFICE NOTES from referring provider [facility]:  12/31/24 - PCC OV with DANIKA Morrison   [facility]  [facility] - ENDO E-Consult with Dr. Badillo   ED NOTES N/A    OPERATIVE REPORT  (thyroid, pituitary, adrenal, parathyroid)  (All op notes related to diagnoses) N/A    MEDICATION LIST Internal    IMAGING      ULTRASOUND (HEAD/NECK)  * Include FNAs [facility]:  1/31/25 - US Thyroid FNA  1/20/25 - US Thyroid   LABS     DIABETES: HBGA1C, CREATININE, FASTING LIPIDS, MICROALBUMIN URINE, POTASSIUM, TSH, T4    THYROID: TSH, T4, CBC, THYRODLONULIN, TOTAL T3, FREE T4, CALCITONIN, CEA [facility]:  4/18/25 - TSH, T4, T3  1/13/25 - Thyroid Peroxidase  12/31/24 - CBC  12/31/24 - CMP  12/31/24 - HBGA1C  12/31/24 - Lipid  3/24/23 - BMP   PATHOLOGY REPORTS WITH CASE NUMBER  *Surgical path reports for endocrine organs (ovaries, testes, pancreas, etc) Internal   [facility]:  1/31/25 - Thyroid FNA (Case: [id number])

## 2025-05-07 ENCOUNTER — OFFICE VISIT (OUTPATIENT)
Dept: ENDOCRINOLOGY | Facility: CLINIC | Age: 45
End: 2025-05-07
Attending: PHYSICIAN ASSISTANT
Payer: COMMERCIAL

## 2025-05-07 ENCOUNTER — PRE VISIT (OUTPATIENT)
Dept: ENDOCRINOLOGY | Facility: CLINIC | Age: 45
End: 2025-05-07

## 2025-05-07 VITALS
DIASTOLIC BLOOD PRESSURE: 97 MMHG | HEART RATE: 126 BPM | WEIGHT: 188 LBS | BODY MASS INDEX: 36.91 KG/M2 | SYSTOLIC BLOOD PRESSURE: 145 MMHG | HEIGHT: 60 IN

## 2025-05-07 DIAGNOSIS — E05.90 HYPERTHYROIDISM: ICD-10-CM

## 2025-05-07 DIAGNOSIS — R25.1 TREMOR: ICD-10-CM

## 2025-05-07 DIAGNOSIS — H02.539 EYELID RETRACTION: ICD-10-CM

## 2025-05-07 DIAGNOSIS — R00.0 TACHYCARDIA: ICD-10-CM

## 2025-05-07 DIAGNOSIS — E05.00 GRAVES DISEASE: Primary | ICD-10-CM

## 2025-05-07 PROCEDURE — 3077F SYST BP >= 140 MM HG: CPT

## 2025-05-07 PROCEDURE — G2211 COMPLEX E/M VISIT ADD ON: HCPCS

## 2025-05-07 PROCEDURE — 1126F AMNT PAIN NOTED NONE PRSNT: CPT

## 2025-05-07 PROCEDURE — 99204 OFFICE O/P NEW MOD 45 MIN: CPT

## 2025-05-07 PROCEDURE — 3080F DIAST BP >= 90 MM HG: CPT

## 2025-05-07 RX ORDER — METHIMAZOLE 10 MG/1
10 TABLET ORAL DAILY
Qty: 30 TABLET | Refills: 3 | Status: SHIPPED | OUTPATIENT
Start: 2025-05-07

## 2025-05-07 ASSESSMENT — PAIN SCALES - GENERAL: PAINLEVEL_OUTOF10: NO PAIN (0)

## 2025-05-07 NOTE — LETTER
"5/7/2025       RE: See Roshan  [address] [place] [id number]     Dear Colleague,    Thank you for referring your patient, See Roshan, to the [facility] ENDOCRINOLOG[facility] [place] at [facility]. Please see a copy of my visit note below.    Endocrine Consult note    Attending Assessment/Plan :     Graves' hyperthyroidism . This is documented to 12/31/24 with worsening trend on serial testing .   I have counseled her on Graves' treatment options including antithyroid drugs, radioactive iodine and thyroidectomy. I have given her an information sheet which outlines the advantages and disadvantages of each option and I have reviewed the options.  Start methimazole 10 mg/day  Labs in a month and monthly   I have given her an information sheet which specifically outlines the potential complications and rules for patients on antithyroid medication, including when to call, and our daytime and nighttime contact information.    Tremor is due to #1     Thyrotoxicosis is due to # 1     Stare/lid retraction is due to # 1    Thyroid nodule - benign cytology 1/31/25--  will need follow up US in 1-2 years      BMI 37.37 weight 185 on 12/31/24.  OK to resume tirzepatide which she had held    42_minutes spent on the date of the encounter doing chart review, history and exam, documentation and further activities as noted above,       Jenn Badillo MD    Chief complaint:  See is a 44 year old female seen in consultation at the request of jacque Arndt for hyperthyroidism.   I have reviewed Care Everywhere including [facility] lab reports, imaging reports and provider notes as indicated.      HISTORY OF PRESENT ILLNESS    12/31/24 thyrotoxicosis was first noted at routine clinic appt reporting HA x days \"Very localized area in the back/posterior head on the right side Very sensitive - could not even brush her hair at the onset. Now pain is better but skin still feels " "sensitive in the area to touch\".  Follow up labs showed high TRAB but US showed very low doppler flow.  Econsult was sent to me in April .  Follow up labs showed no improvement and high TSI.       5/7/11 TSH 2.13, free T4 1.04  4/8/22 weight 213, HR 81/ TSH 1.44  5/1/24 weight 196  11/15/24 \"acute sinusitis\"  12/31/24 weight 185, /minute, /82; TSH < 0.01, free T4 1.84 , HgbA1c 5.5%, Ca 9.5, creatinine 0.46   1/13/25 GEMA < 20, TPO 46, TRAB 3.38  3/25/25 TSH < 0.01,free T4 2.42  4/18/25 TSH 0.01, free T4 2.75, T3 275, TSI 3.1 .    1/20/25 thyroid US:   Right superior thyroid nodule # 1 1.5 x 1.3 x 2.3 FNAB 1/31/25 \"benign\" (no description of cells)   Very Low doppler flow     REVIEW OF SYSTEMS  Weight  loss on GLP1 25#; stopped GLP1 and regained 4-5#  Stopped walking due to feeling shaky  Sleep is good   Energy: don't like to be active due to being so jittery   Eyes are a little blurry;  + eye dryness attributed to phone/ games  Right eye is always watery -- people notice , looks like she is crying   No diplopia;   Rapid heart rate when she has the     tremor;   GI: BM x 1-2 /day; no change;   Has home oximeter which recorded resting /minute  Tremor; couldn't stick dog tag in -  Checked BS   Back pain today - intermittent stabbing  Feared she was dying  10 system ROS otherwise as per the HPI or negative    Past Medical History  Past Medical History:   Diagnosis Date     Hypertension      Infection due to 2019 novel coronavirus 08/2022    after [facility]     Thyrotoxicosis 12/31/2024     Past Surgical History:   Procedure Laterality Date     ESOPHAGOSCOPY, GASTROSCOPY, DUODENOSCOPY (EGD), COMBINED N/A 7/16/2015    Procedure: COMBINED ESOPHAGOSCOPY, GASTROSCOPY, DUODENOSCOPY (EGD), BIOPSY SINGLE OR MULTIPLE;  Surgeon: Keagan Lozano MD;  Location: [facility]     INJECT EPIDURAL CAUDAL  6/25/2012    Procedure: INJECT EPIDURAL CAUDAL;  ANUEL Caudal--;  Surgeon: Provider, Generic Anesthesia;  Location: [facility] " OR     [place] teeth pulled one  2008       Medications  Current Outpatient Medications   Medication Sig Dispense Refill     methimazole (TAPAZOLE) 10 MG tablet Take 1 tablet (10 mg) by mouth daily. 30 tablet 3     famotidine (PEPCID) 20 MG tablet TAKE 1 TABLET (20 MG) BY MOUTH 2 TIMES DAILY 180 tablet 3     fluticasone (FLONASE) 50 MCG/ACT nasal spray USE 1-2 SPRAYS INTO BOTH NOSTRILS ONCE DAILY 16 g 3     hydrOXYzine HCl (ATARAX) 25 MG tablet Take 1-2 tablets (25-50 mg) by mouth every 6 hours as needed for itching or anxiety 60 tablet 11     ibuprofen (ADVIL/MOTRIN) 800 MG tablet Take 1 tablet (800 mg) by mouth every 8 hours as needed for moderate pain 30 tablet 1     ketotifen (ZADITOR) 0.025 % ophthalmic solution PLACE 1 DROP INTO BOTH EYES TWO TIMES A DAY 5 mL 11     levocetirizine (XYZAL) 5 MG tablet Take 1 tablet (5 mg) by mouth every evening 90 tablet 3     losartan (COZAAR) 50 MG tablet Take 1 tablet (50 mg) by mouth daily. 90 tablet 3     norethindrone (AYGESTIN) 5 MG tablet Take 1 tablet (5 mg) by mouth daily. 90 tablet 0     pseudoePHEDrine (SUDOGEST) 30 MG tablet TAKE 1-2 TABLETS (30-60 MG) BY MOUTH EVERY 4 HOURS AS NEEDED FOR CONGESTION (Patient not taking: Reported on 5/7/2025) 200 tablet 1     tirzepatide-Weight Management (ZEPBOUND) 5 MG/0.5ML prefilled pen Inject 0.5 mLs (5 mg) subcutaneously every 7 days. (Patient not taking: Reported on 5/7/2025) 6 mL 0     tirzepatide-Weight Management (ZEPBOUND) 7.5 MG/0.5ML prefilled pen Inject 0.5 mLs (7.5 mg) subcutaneously every 7 days. 2 mL 0       Allergies  Allergies   Allergen Reactions     Amoxicillin Hives       Family History  Family History   Problem Relation Age of Onset     Diabetes Mother      Hypertension Mother      Arthritis Mother      Cardiovascular Father      Hypertension Father      Hypertension Sister      Hypertension Sister      Hypertension Brother      Hypertension Brother      Hypertension Brother      Hypertension Brother       "Hypertension Brother      Hypertension Brother      Thyroid Disease No family hx of      Social History  Social History     Tobacco Use     Smoking status: Former     Current packs/day: 0.00     Average packs/day: 0.5 packs/day for 10.0 years (5.0 ttl pk-yrs)     Types: Cigarettes     Start date: 1995     Quit date: 2005     Years since quittin.3     Passive exposure: Never     Smokeless tobacco: Never   Vaping Use     Vaping status: Never Used   Substance Use Topics     Alcohol use: No     Drug use: No     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  .    NECK: supple.  No visible or palpable neck masses, cervical adenopathy, or goiter. I can't feel the thyroid  LUNGS: clear to auscultation bilaterally.   CARDIAC: tachy RRR, S1, S2 without murmurs, rubs or gallops.    BACK: normal spinal contour.    NEURO: Alert, responds appropriately to questions,  moves all extremities, DTRs 2/4, gait normal, + fine tremor of the outstretched hand    DATA REVIEW     Latest Ref Rng 2011  10:39 AM 2011  9:01 AM 2022  8:50 AM 2024  9:51 AM 2025  3:19 PM   ENDO THYROID LABS-[facility]         TSH 0.40 - 4.00 mU/L 2.13  1.80  1.44      TSH 0.30 - 4.20 uIU/mL    <0.01 (L)     Triiodothyronine (T3) 85 - 202 ng/dL        FREE T4 0.90 - 1.70 ng/dL 1.04    1.84 (H)     Thyroglobulin Antibody <40 IU/mL     <20    Thyroid Peroxidase Antibody <35 IU/mL     46 (H)    Thyroid Stim Immunog <=1.3 TSI index           Latest Ref Rng 3/28/2025  8:41 AM 2025  1:03 PM   ENDO THYROID LABS-[facility]      TSH 0.40 - 4.00 mU/L     TSH 0.30 - 4.20 uIU/mL <0.01 (L)  0.01 (L)  "   Triiodothyronine (T3) 85 - 202 ng/dL  275 (H)    FREE T4 0.90 - 1.70 ng/dL 2.42 (H)  2.74 (H)    Thyroglobulin Antibody <40 IU/mL     Thyroid Peroxidase Antibody <35 IU/mL     Thyroid Stim Immunog <=1.3 TSI index  3.1 (H)       EXAM: US THYROID  LOCATION: [facility]  DATE: 1/20/2025  INDICATION: Hyperthyroidism.  COMPARISON: None.  TECHNIQUE: Thyroid ultrasound.   FINDINGS:  RIGHT lobe: 4.9 x 1.7 x 1.6 cm. Mildly heterogeneous echotexture.  Isthmus: 2 mm.  LEFT lobe: 4.9 x 1.3 x 1.3 cm. Mildly heterogeneous echotexture.  NoDULES:  Nodule 1: Hypoechoic solid nodule in the mid right thyroid lobe measures 2.3 x 1.5 x 1.3 cm.   Composition: Solid or almost completely solid, 2 points   Echogenicity: Hypoechoic, 2 points   Shape: Wider-than-tall, 0 points   Margin: Smooth, 0 points   Echogenic Foci: None, or large comet-tail artifacts, 0 points   Point Total: 4-6 points. TI-RADS 4. If 1.5 cm or larger, recommend FNA; if 1 cm or larger, follow up US (annually for 5 years).                                                                 IMPRESSION:  Solitary right thyroid nodule is described in detail above. By TI-RADS criteria, ultrasound-guided fine-needle aspiration would be recommended.        Nodules are characterized per  ACR Thyroid Imaging, Reporting and Data System (TI-RADS): White Paper of the ACR TI-RADS Committee  Richard Head. et al. Journal of the American College of Radiology 2017. Volume 14 (2017), Issue 5, 587595.     Again, thank you for allowing me to participate in the care of your patient.      Sincerely,    Jenn Badillo MD

## 2025-05-07 NOTE — PROGRESS NOTES
"Endocrine Consult note    Attending Assessment/Plan :     Graves' hyperthyroidism . This is documented to 12/31/24 with worsening trend on serial testing .   I have counseled her on Graves' treatment options including antithyroid drugs, radioactive iodine and thyroidectomy. I have given her an information sheet which outlines the advantages and disadvantages of each option and I have reviewed the options.  Start methimazole 10 mg/day  Labs in a month and monthly   I have given her an information sheet which specifically outlines the potential complications and rules for patients on antithyroid medication, including when to call, and our daytime and nighttime contact information.    Tremor is due to #1     Thyrotoxicosis is due to # 1     Stare/lid retraction is due to # 1    Thyroid nodule - benign cytology 1/31/25--  will need follow up US in 1-2 years      BMI 37.37 weight 185 on 12/31/24.  OK to resume tirzepatide which she had held    42_minutes spent on the date of the encounter doing chart review, history and exam, documentation and further activities as noted above,       Jenn Badillo MD    Chief complaint:  See is a 44 year old female seen in consultation at the request of jacque Arndt for hyperthyroidism.   I have reviewed Care Everywhere including [facility] lab reports, imaging reports and provider notes as indicated.      HISTORY OF PRESENT ILLNESS    12/31/24 thyrotoxicosis was first noted at routine clinic appt reporting HA x days \"Very localized area in the back/posterior head on the right side Very sensitive - could not even brush her hair at the onset. Now pain is better but skin still feels sensitive in the area to touch\".  Follow up labs showed high TRAB but US showed very low doppler flow.  Econsult was sent to me in April .  Follow up labs showed no improvement and high TSI.       5/7/11 TSH 2.13, free T4 1.04  4/8/22 weight 213, HR 81/ TSH 1.44  5/1/24 weight 196  11/15/24 \"acute " "sinusitis\"  12/31/24 weight 185, /minute, /82; TSH < 0.01, free T4 1.84 , HgbA1c 5.5%, Ca 9.5, creatinine 0.46   1/13/25 GEMA < 20, TPO 46, TRAB 3.38  3/25/25 TSH < 0.01,free T4 2.42  4/18/25 TSH 0.01, free T4 2.75, T3 275, TSI 3.1 .    1/20/25 thyroid US:   Right superior thyroid nodule # 1 1.5 x 1.3 x 2.3 FNAB 1/31/25 \"benign\" (no description of cells)   Very Low doppler flow     REVIEW OF SYSTEMS  Weight  loss on GLP1 25#; stopped GLP1 and regained 4-5#  Stopped walking due to feeling shaky  Sleep is good   Energy: don't like to be active due to being so jittery   Eyes are a little blurry;  + eye dryness attributed to phone/ games  Right eye is always watery -- people notice , looks like she is crying   No diplopia;   Rapid heart rate when she has the     tremor;   GI: BM x 1-2 /day; no change;   Has home oximeter which recorded resting /minute  Tremor; couldn't stick dog tag in -  Checked BS   Back pain today - intermittent stabbing  Feared she was dying  10 system ROS otherwise as per the HPI or negative    Past Medical History  Past Medical History:   Diagnosis Date    Hypertension     Infection due to 2019 novel coronavirus 08/2022    after [facility]    Thyrotoxicosis 12/31/2024     Past Surgical History:   Procedure Laterality Date    ESOPHAGOSCOPY, GASTROSCOPY, DUODENOSCOPY (EGD), COMBINED N/A 7/16/2015    Procedure: COMBINED ESOPHAGOSCOPY, GASTROSCOPY, DUODENOSCOPY (EGD), BIOPSY SINGLE OR MULTIPLE;  Surgeon: Keagan Lozano MD;  Location: [facility]    INJECT EPIDURAL CAUDAL  6/25/2012    Procedure: INJECT EPIDURAL CAUDAL;  ANUEL Caudal--;  Surgeon: Provider, Generic Anesthesia;  Location: [facility]    [place] teeth pulled one  2008       Medications  Current Outpatient Medications   Medication Sig Dispense Refill    methimazole (TAPAZOLE) 10 MG tablet Take 1 tablet (10 mg) by mouth daily. 30 tablet 3    famotidine (PEPCID) 20 MG tablet TAKE 1 TABLET (20 MG) BY MOUTH 2 TIMES DAILY 180 tablet 3    " fluticasone (FLONASE) 50 MCG/ACT nasal spray USE 1-2 SPRAYS INTO BOTH NOSTRILS ONCE DAILY 16 g 3    hydrOXYzine HCl (ATARAX) 25 MG tablet Take 1-2 tablets (25-50 mg) by mouth every 6 hours as needed for itching or anxiety 60 tablet 11    ibuprofen (ADVIL/MOTRIN) 800 MG tablet Take 1 tablet (800 mg) by mouth every 8 hours as needed for moderate pain 30 tablet 1    ketotifen (ZADITOR) 0.025 % ophthalmic solution PLACE 1 DROP INTO BOTH EYES TWO TIMES A DAY 5 mL 11    levocetirizine (XYZAL) 5 MG tablet Take 1 tablet (5 mg) by mouth every evening 90 tablet 3    losartan (COZAAR) 50 MG tablet Take 1 tablet (50 mg) by mouth daily. 90 tablet 3    norethindrone (AYGESTIN) 5 MG tablet Take 1 tablet (5 mg) by mouth daily. 90 tablet 0    pseudoePHEDrine (SUDOGEST) 30 MG tablet TAKE 1-2 TABLETS (30-60 MG) BY MOUTH EVERY 4 HOURS AS NEEDED FOR CONGESTION (Patient not taking: Reported on 5/7/2025) 200 tablet 1    tirzepatide-Weight Management (ZEPBOUND) 5 MG/0.5ML prefilled pen Inject 0.5 mLs (5 mg) subcutaneously every 7 days. (Patient not taking: Reported on 5/7/2025) 6 mL 0    tirzepatide-Weight Management (ZEPBOUND) 7.5 MG/0.5ML prefilled pen Inject 0.5 mLs (7.5 mg) subcutaneously every 7 days. 2 mL 0       Allergies  Allergies   Allergen Reactions    Amoxicillin Hives       Family History  Family History   Problem Relation Age of Onset    Diabetes Mother     Hypertension Mother     Arthritis Mother     Cardiovascular Father     Hypertension Father     Hypertension Sister     Hypertension Sister     Hypertension Brother     Hypertension Brother     Hypertension Brother     Hypertension Brother     Hypertension Brother     Hypertension Brother     Thyroid Disease No family hx of      Social History  Social History     Tobacco Use    Smoking status: Former     Current packs/day: 0.00     Average packs/day: 0.5 packs/day for 10.0 years (5.0 ttl pk-yrs)     Types: Cigarettes     Start date: 1/1/1995     Quit date: 1/1/2005      "Years since quittin.3     Passive exposure: Never    Smokeless tobacco: Never   Vaping Use    Vaping status: Never Used   Substance Use Topics    Alcohol use: No    Drug use: No     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  .    NECK: supple.  No visible or palpable neck masses, cervical adenopathy, or goiter. I can't feel the thyroid  LUNGS: clear to auscultation bilaterally.   CARDIAC: tachy RRR, S1, S2 without murmurs, rubs or gallops.    BACK: normal spinal contour.    NEURO: Alert, responds appropriately to questions,  moves all extremities, DTRs 2/4, gait normal, + fine tremor of the outstretched hand    DATA REVIEW     Latest Ref Rng 2011  10:39 AM 2011  9:01 AM 2022  8:50 AM 2024  9:51 AM 2025  3:19 PM   ENDO THYROID LABS-[facility]         TSH 0.40 - 4.00 mU/L 2.13  1.80  1.44      TSH 0.30 - 4.20 uIU/mL    <0.01 (L)     Triiodothyronine (T3) 85 - 202 ng/dL        FREE T4 0.90 - 1.70 ng/dL 1.04    1.84 (H)     Thyroglobulin Antibody <40 IU/mL     <20    Thyroid Peroxidase Antibody <35 IU/mL     46 (H)    Thyroid Stim Immunog <=1.3 TSI index           Latest Ref Rng 3/28/2025  8:41 AM 2025  1:03 PM   ENDO THYROID LABS-[facility]      TSH 0.40 - 4.00 mU/L     TSH 0.30 - 4.20 uIU/mL <0.01 (L)  0.01 (L)    Triiodothyronine (T3) 85 - 202 ng/dL  275 (H)    FREE T4 0.90 - 1.70 ng/dL 2.42 (H)  2.74 (H)    Thyroglobulin Antibody <40 IU/mL     Thyroid Peroxidase Antibody <35 IU/mL     Thyroid Stim Immunog <=1.3 TSI index  3.1 (H)       EXAM: US THYROID  LOCATION: [facility]  DATE: 2025  INDICATION: Hyperthyroidism.  COMPARISON: " None.  TECHNIQUE: Thyroid ultrasound.   FINDINGS:  RIGHT lobe: 4.9 x 1.7 x 1.6 cm. Mildly heterogeneous echotexture.  Isthmus: 2 mm.  LEFT lobe: 4.9 x 1.3 x 1.3 cm. Mildly heterogeneous echotexture.  NoDULES:  Nodule 1: Hypoechoic solid nodule in the mid right thyroid lobe measures 2.3 x 1.5 x 1.3 cm.   Composition: Solid or almost completely solid, 2 points   Echogenicity: Hypoechoic, 2 points   Shape: Wider-than-tall, 0 points   Margin: Smooth, 0 points   Echogenic Foci: None, or large comet-tail artifacts, 0 points   Point Total: 4-6 points. TI-RADS 4. If 1.5 cm or larger, recommend FNA; if 1 cm or larger, follow up US (annually for 5 years).                                                                 IMPRESSION:  Solitary right thyroid nodule is described in detail above. By TI-RADS criteria, ultrasound-guided fine-needle aspiration would be recommended.        Nodules are characterized per  ACR Thyroid Imaging, Reporting and Data System (TI-RADS): White Paper of the ACR TI-RADS Committee  Richard Head et al. Journal of the American College of Radiology 2017. Volume 14 (2017), Issue 5, 587595.

## 2025-05-07 NOTE — PATIENT INSTRUCTIONS
Start methimazole 10 mg/day  Labs in June and monthly   I will send results on mychart      Options for Treatment of Hyperthyroidism in Graves  hyperthyroidism    There are 3 options for treating hyperthyroidism.  The treatment method that is best for you depends on several factors, including your age, general health status, pregnancy status, convenience and preferences.      The options for treatment are listed below with advantages and disadvantages of each:    Medicine.      This requires taking a pill one to 3 times / day.  You will require monthly follow-up with me during the time you are taking the pills.  The pills will control the ability of the thyroid to make too much thyroid hormone and you will gradually improve.      Advantages:  This is an easy and effective form of therapy.    Disadvantages:  This only controls the thyroid while you take the pills. With discontinuation of the pills, the hyperthyroidism will relapse probably within days.    Side-effects are very rare but can be serious, including agranulocytosis (or the loss of white blood cells that control infection).      The pills do not come in an intravenous form, which can make treatment very difficult if you are sick and unable to take oral medication.  The medication is not a life long option, but an acceptable short term treatment.      Ideally antithyroid medication is avoided in pregnancy.      Radioactive iodine    Since the thyroid uses iodine to make thyroid hormone, administration of appropriate doses of radioactive iodine will specifically target and destroy the thyroid, thereby treating the over-activity.     Procedure:  Before the treatment, it is necessary that we determine the function of your thyroid gland by performing a thyroid uptake test in the radiology department at the hospital.  This is a 2-day procedure.  Day 1 involves oral administration of a very small dose of radioactive iodine.  On day 2 (24 hours after the dose was  administered), you will lay under the camera, which will take a picture of your thyroid.  This will give us information about your thyroid s function, which we need in order to calculate your treatment dose of radioactive iodine.     The treatment dose of radioactive iodine is delivered either later on the same day (day 2 of the uptake test), or at your earliest convenience.  The treatment is again an oral administration of radioactive iodine, but the dose is much higher than that used for the uptake test.  You will then gradually return to normal thyroid function over a period of weeks to months.      Advantages:  This is an easy and effective form of therapy.  Painless.      Disadvantages:  In many cases this treatment results in permanent hypothyroidism (thyroid under-activity) which will require thyroid hormone replacement pills for the rest of your life).      Women of reproductive age must be documented to not be pregnant before the treatment.  We also recommend that you not attempt pregnancy for 6 months after the treatment.    This treatment may increase the thyroid stimulating antibody levels and worsen the eye disease of Graves .     Surgical removal of the thyroid gland.      Surgical removal of the thyroid gland will quickly result in hypothyroidism (requirement for thyroid hormone replacement).  For your safety, it is important that your thyroid function is normal prior to the operation (controlled by the anti-thyroid medication) and also that you have an experienced thyroid surgeon perform the operation.    Advantages:  Effective for treatment of hyperthyroidism.      Disadvantages: Risk of damage to the recurrent laryngeal nerve (which can lead to hoarseness); risk of damage to the parathyroid glands (which can lead to low calcium).  Anesthesia risks.  General surgical risks of bleeding, infection.       Information for patients on Tapazole or Propylthiouracil (PTU)  The generic name for Tapazole is  methimazole.      The drugs, Tapazole and PTU are used to treat an overactive thyroid (hyperthyroidism).      They prevent the thyroid from making too much thyroid hormone.  You can think of them as putting up a road block in your thyroid.    These drugs are usually well tolerated and safe, but rarely can cause serious side effects.  The two worst potential side-effects are:  agranulocytosis.  This is the loss of the white blood cells that fight infection.  This can occur in approximately 1 in 200 people.  liver problems.  This is even more rare than agranulocytosis but it can be very serious.    Because of the serious nature of the rare side-effects, you should notify your doctor if you develop the following symptoms while taking the drug:  Fever  sore throat  flu-like symptoms (nausea, vomiting, diarrhea, aches, abdominal pain)    In addition, anytime you have evidence of infection, you should get a blood test to be sure that you do not have agranulocytosis.  A prescription will be provided to you to get this blood test as needed.  This is an extra precaution and the results should be available the same day the blood test is drawn.  If your blood count is OK (which it almost always is), then you may continue to take the antithyroid drug.    While taking this medication, your doctor will probably want to see you frequently, every 1-2 months, at least for a time.  This is important so that the response can be monitored and the dose can be adjusted.      If you have concerns you may reach us at [phone] during regular hours, and [phone] (ask for endocrinologist on call) after hours.

## 2025-05-21 ENCOUNTER — OFFICE VISIT (OUTPATIENT)
Dept: OBGYN | Facility: CLINIC | Age: 45
End: 2025-05-21
Payer: COMMERCIAL

## 2025-05-21 VITALS
HEIGHT: 59 IN | TEMPERATURE: 98.7 F | WEIGHT: 188 LBS | HEART RATE: 84 BPM | SYSTOLIC BLOOD PRESSURE: 137 MMHG | BODY MASS INDEX: 37.9 KG/M2 | RESPIRATION RATE: 18 BRPM | DIASTOLIC BLOOD PRESSURE: 86 MMHG

## 2025-05-21 DIAGNOSIS — N93.9 ABNORMAL UTERINE BLEEDING (AUB): ICD-10-CM

## 2025-05-21 PROCEDURE — 3079F DIAST BP 80-89 MM HG: CPT | Performed by: OBSTETRICS & GYNECOLOGY

## 2025-05-21 PROCEDURE — 99213 OFFICE O/P EST LOW 20 MIN: CPT | Performed by: OBSTETRICS & GYNECOLOGY

## 2025-05-21 PROCEDURE — 3075F SYST BP GE 130 - 139MM HG: CPT | Performed by: OBSTETRICS & GYNECOLOGY

## 2025-05-21 RX ORDER — NORETHINDRONE 5 MG/1
5 TABLET ORAL DAILY
Qty: 90 TABLET | Refills: 4 | Status: SHIPPED | OUTPATIENT
Start: 2025-05-21

## 2025-05-21 NOTE — NURSING NOTE
"Initial /86 (BP Location: Right arm, Patient Position: Sitting, Cuff Size: Adult Regular)   Pulse 84   Temp 98.7  F (37.1  C) (Tympanic)   Resp 18   Ht 1.499 m (4' 11\")   Wt 85.3 kg (188 lb)   BMI 37.97 kg/m   Estimated body mass index is 37.97 kg/m  as calculated from the following:    Height as of this encounter: 1.499 m (4' 11\").    Weight as of this encounter: 85.3 kg (188 lb). .    "

## 2025-05-21 NOTE — PROGRESS NOTES
[facility]    Gynecology Office Note    CC:   Chief Complaint   Patient presents with    Follow Up        HPI: See Roshan is a 44 year old  who presents for medication refill. Patient was started on Aygestin for AUB a year ago. Reports good control of symptoms.  After starting the medication about a year ago, she did experience some depression for a few weeks, this has since resolved.  She also had a couple days of spotting around the end of the year, otherwise no bleeding.  Thinks the spotting might have been related to a new diagnosis of hyperthyroidism.  She did run low on medications and was taking a half dose for a couple of weeks, experienced a lot of cramping with this but no bleeding. Wants to continue the medication.     GYN Hx:     No LMP recorded. (Menstrual status: Birth Control).    History of PCOS.  Not currently sexually active.     Last Pap Smear: 2024 NIL, HPV negative    ROS: A 10 pt ROS was completed and found to be otherwise negative unless mentioned in the HPI.     PMH:   Past Medical History:   Diagnosis Date    Hypertension     Hyperthyroidism 2025    Infection due to  novel coronavirus 2022    after [place]    Thyrotoxicosis 2024       PSHx:   Past Surgical History:   Procedure Laterality Date    ESOPHAGOSCOPY, GASTROSCOPY, DUODENOSCOPY (EGD), COMBINED N/A 2015    Procedure: COMBINED ESOPHAGOSCOPY, GASTROSCOPY, DUODENOSCOPY (EGD), BIOPSY SINGLE OR MULTIPLE;  Surgeon: Keagan Lozano MD;  Location: [facility]    INJECT EPIDURAL CAUDAL  2012    Procedure: INJECT EPIDURAL CAUDAL;  ANUEL Caudal--;  Surgeon: Provider, Generic Anesthesia;  Location: [facility]    [place] teeth pulled one         OBHx:   OB History    Para Term  AB Living   0 0 0 0 0 0   SAB IAB Ectopic Multiple Live Births   0 0 0 0 0       Medications:   Current Outpatient Medications   Medication Sig Dispense Refill    famotidine (PEPCID) 20 MG tablet TAKE 1  TABLET (20 MG) BY MOUTH 2 TIMES DAILY 180 tablet 3    fluticasone (FLONASE) 50 MCG/ACT nasal spray USE 1-2 SPRAYS INTO BOTH NOSTRILS ONCE DAILY 16 g 3    hydrOXYzine HCl (ATARAX) 25 MG tablet Take 1-2 tablets (25-50 mg) by mouth every 6 hours as needed for itching or anxiety 60 tablet 11    ibuprofen (ADVIL/MOTRIN) 800 MG tablet Take 1 tablet (800 mg) by mouth every 8 hours as needed for moderate pain 30 tablet 1    ketotifen (ZADITOR) 0.025 % ophthalmic solution PLACE 1 DROP INTO BOTH EYES TWO TIMES A DAY 5 mL 11    levocetirizine (XYZAL) 5 MG tablet Take 1 tablet (5 mg) by mouth every evening 90 tablet 3    losartan (COZAAR) 50 MG tablet Take 1 tablet (50 mg) by mouth daily. 90 tablet 3    methimazole (TAPAZOLE) 10 MG tablet Take 1 tablet (10 mg) by mouth daily. 30 tablet 3    norethindrone (AYGESTIN) 5 MG tablet Take 1 tablet (5 mg) by mouth daily. 90 tablet 4    pseudoePHEDrine (SUDOGEST) 30 MG tablet TAKE 1-2 TABLETS (30-60 MG) BY MOUTH EVERY 4 HOURS AS NEEDED FOR CONGESTION (Patient not taking: Reported on 5/7/2025) 200 tablet 1    tirzepatide-Weight Management (ZEPBOUND) 5 MG/0.5ML prefilled pen Inject 0.5 mLs (5 mg) subcutaneously every 7 days. (Patient not taking: Reported on 5/7/2025) 6 mL 0    tirzepatide-Weight Management (ZEPBOUND) 7.5 MG/0.5ML prefilled pen Inject 0.5 mLs (7.5 mg) subcutaneously every 7 days. 2 mL 0     No current facility-administered medications for this visit.       Allergies:      Allergies   Allergen Reactions    Amoxicillin Hives       Social History:   Social History     Socioeconomic History    Marital status:      Spouse name: Candy Islas    Number of children: 0    Years of education: 12+    Highest education level: Not on file   Occupational History     Employer: [employer]     Comment: [employer]   Tobacco Use    Smoking status: Former     Current packs/day: 0.00     Average packs/day: 0.5 packs/day for 10.0 years (5.0 ttl pk-yrs)     Types: Cigarettes     Start  date: 1995     Quit date: 2005     Years since quittin.3     Passive exposure: Never    Smokeless tobacco: Never   Vaping Use    Vaping status: Never Used   Substance and Sexual Activity    Alcohol use: No    Drug use: No    Sexual activity: Not Currently     Partners: Male     Birth control/protection: None   Other Topics Concern    Parent/sibling w/ CABG, MI or angioplasty before 65F 55M? No     Service No    Blood Transfusions No    Caffeine Concern Yes     Comment: mt dew 1 a week    Occupational Exposure No    Hobby Hazards No    Sleep Concern No    Stress Concern No    Weight Concern Yes    Special Diet Yes     Comment: one a day and probiotic    Back Care No    Exercise No    Bike Helmet No    Seat Belt Yes    Self-Exams Not Asked   Social History Narrative    Not on file     Social Drivers of Health     Financial Resource Strain: Low Risk  (2024)    Financial Resource Strain     Within the past 12 months, have you or your family members you live with been unable to get utilities (heat, electricity) when it was really needed?: No   Food Insecurity: Low Risk  (2024)    Food Insecurity     Within the past 12 months, did you worry that your food would run out before you got money to buy more?: No     Within the past 12 months, did the food you bought just not last and you didn t have money to get more?: No   Transportation Needs: Low Risk  (2024)    Transportation Needs     Within the past 12 months, has lack of transportation kept you from medical appointments, getting your medicines, non-medical meetings or appointments, work, or from getting things that you need?: No   Physical Activity: Inactive (2024)    Exercise Vital Sign     Days of Exercise per Week: 0 days     Minutes of Exercise per Session: 0 min   Stress: No Stress Concern Present (2024)    [employer] [place] [employer] - Occupational Stress Questionnaire     Feeling of Stress : Only a  "little   Social Connections: Unknown (12/30/2024)    Social Connection and Isolation Panel [NHANES]     Frequency of Communication with Friends and Family: Not on file     Frequency of Social Gatherings with Friends and Family: Once a week     Attends Mandaeism Services: Not on file     Active Member of Clubs or Organizations: Not on file     Attends Club or Organization Meetings: Not on file     Marital Status: Not on file   Interpersonal Safety: Low Risk  (11/15/2024)    Interpersonal Safety     Do you feel physically and emotionally safe where you currently live?: Yes     Within the past 12 months, have you been hit, slapped, kicked or otherwise physically hurt by someone?: No     Within the past 12 months, have you been humiliated or emotionally abused in other ways by your partner or ex-partner?: No   Housing Stability: Low Risk  (12/30/2024)    Housing Stability     Do you have housing? : Yes     Are you worried about losing your housing?: No         Family History:   Family History   Problem Relation Age of Onset    Diabetes Mother     Hypertension Mother     Arthritis Mother     Cardiovascular Father     Hypertension Father     Hypertension Sister     Hypertension Sister     Hypertension Brother     Hypertension Brother     Hypertension Brother     Hypertension Brother     Hypertension Brother     Hypertension Brother     Thyroid Disease No family hx of        Physical Exam:   Vitals:    05/21/25 0946   BP: 137/86   BP Location: Right arm   Patient Position: Sitting   Cuff Size: Adult Regular   Pulse: 84   Resp: 18   Temp: 98.7  F (37.1  C)   TempSrc: Tympanic   Weight: 85.3 kg (188 lb)   Height: 1.499 m (4' 11\")      Estimated body mass index is 37.97 kg/m  as calculated from the following:    Height as of this encounter: 1.499 m (4' 11\").    Weight as of this encounter: 85.3 kg (188 lb).    General appearance: well-hydrated, A&O x 3, no apparent distress  Lungs: Equal expansion bilaterally, no accessory " muscle use  Heart: No heaves or thrills.   Constitutional: See vitals  Extremities: no edema  Neuro: CN II-XII grossly intact      Assessment and Plan:   See was seen today for follow up.    Diagnoses and all orders for this visit:    Abnormal uterine bleeding (AUB)  -     norethindrone (AYGESTIN) 5 MG tablet; Take 1 tablet (5 mg) by mouth daily.          Patient symptoms of abnormal bleeding are well-controlled on Aygestin, she would like to continue, refill was sent.  Patient is planning to restart Tirzepatide tied for weight loss.  We discussed the possible decreased absorption of Aygestin while taking this medication.  Will plan to continue to monitor symptoms.  If bleeding returns or other symptoms present, possible concern for suboptimal treatment due to decreased absorption. Plan to reevaluate management options.    Discussed mammogram screening, q1-2 year screening before age 50. She has no family history of breast cancer and will opt for a 2-year screening interval, due next year.    Health maintenance: pap smear up to date, mammogram up to date   Return to clinic for annual cares, or sooner if needed.    Angelica Pena DO

## 2025-06-06 ENCOUNTER — RESULTS FOLLOW-UP (OUTPATIENT)
Dept: ENDOCRINOLOGY | Facility: CLINIC | Age: 45
End: 2025-06-06

## 2025-07-07 ENCOUNTER — LAB (OUTPATIENT)
Dept: LAB | Facility: CLINIC | Age: 45
End: 2025-07-07
Payer: COMMERCIAL

## 2025-07-07 DIAGNOSIS — E05.90 HYPERTHYROIDISM: ICD-10-CM

## 2025-07-07 DIAGNOSIS — E05.00 GRAVES DISEASE: ICD-10-CM

## 2025-07-07 LAB
T3 SERPL-MCNC: 124 NG/DL (ref 85–202)
T4 FREE SERPL-MCNC: 1.06 NG/DL (ref 0.9–1.7)
TSH SERPL DL<=0.005 MIU/L-ACNC: 0.01 UIU/ML (ref 0.3–4.2)

## 2025-07-07 PROCEDURE — 84480 ASSAY TRIIODOTHYRONINE (T3): CPT

## 2025-07-07 PROCEDURE — 36415 COLL VENOUS BLD VENIPUNCTURE: CPT

## 2025-07-07 PROCEDURE — 84443 ASSAY THYROID STIM HORMONE: CPT

## 2025-07-07 PROCEDURE — 84439 ASSAY OF FREE THYROXINE: CPT

## 2025-07-23 ENCOUNTER — MYC MEDICAL ADVICE (OUTPATIENT)
Dept: FAMILY MEDICINE | Facility: CLINIC | Age: 45
End: 2025-07-23
Payer: COMMERCIAL

## 2025-08-04 ENCOUNTER — LAB (OUTPATIENT)
Dept: LAB | Facility: CLINIC | Age: 45
End: 2025-08-04
Payer: COMMERCIAL

## 2025-08-04 DIAGNOSIS — E05.00 GRAVES DISEASE: Primary | ICD-10-CM

## 2025-08-04 DIAGNOSIS — E05.90 HYPERTHYROIDISM: ICD-10-CM

## 2025-08-04 DIAGNOSIS — E05.00 GRAVES DISEASE: ICD-10-CM

## 2025-08-04 LAB
T3 SERPL-MCNC: 75 NG/DL (ref 85–202)
T4 FREE SERPL-MCNC: 0.76 NG/DL (ref 0.9–1.7)
TSH SERPL DL<=0.005 MIU/L-ACNC: 0.17 UIU/ML (ref 0.3–4.2)

## 2025-08-04 PROCEDURE — 84439 ASSAY OF FREE THYROXINE: CPT

## 2025-08-04 PROCEDURE — 84480 ASSAY TRIIODOTHYRONINE (T3): CPT

## 2025-08-04 PROCEDURE — 36415 COLL VENOUS BLD VENIPUNCTURE: CPT

## 2025-08-04 PROCEDURE — 84443 ASSAY THYROID STIM HORMONE: CPT

## 2025-08-04 RX ORDER — METHIMAZOLE 5 MG/1
5 TABLET ORAL DAILY
Qty: 30 TABLET | Refills: 4 | Status: SHIPPED | OUTPATIENT
Start: 2025-08-04